# Patient Record
Sex: FEMALE | Race: WHITE | Employment: PART TIME | URBAN - METROPOLITAN AREA
[De-identification: names, ages, dates, MRNs, and addresses within clinical notes are randomized per-mention and may not be internally consistent; named-entity substitution may affect disease eponyms.]

---

## 2019-03-06 ENCOUNTER — HOSPITAL ENCOUNTER (OUTPATIENT)
Age: 24
Setting detail: SPECIMEN
Discharge: HOME | End: 2019-03-06
Payer: MEDICAID

## 2019-03-06 LAB
ABSOLUTE EOS #: 0.07 K/UL (ref 0–0.44)
ABSOLUTE IMMATURE GRANULOCYTE: <0.03 K/UL (ref 0–0.3)
ABSOLUTE LYMPH #: 3.07 K/UL (ref 1.1–3.7)
ABSOLUTE MONO #: 0.58 K/UL (ref 0.1–1.2)
ALBUMIN SERPL-MCNC: 4.5 G/DL (ref 3.5–5.2)
ALBUMIN/GLOBULIN RATIO: 1.6 (ref 1–2.5)
ALP BLD-CCNC: 97 U/L (ref 35–104)
ALT SERPL-CCNC: 6 U/L (ref 5–33)
ANION GAP SERPL CALCULATED.3IONS-SCNC: 13 MMOL/L (ref 9–17)
AST SERPL-CCNC: 16 U/L
BASOPHILS # BLD: 0 % (ref 0–2)
BASOPHILS ABSOLUTE: <0.03 K/UL (ref 0–0.2)
BILIRUB SERPL-MCNC: 0.27 MG/DL (ref 0.3–1.2)
BUN BLDV-MCNC: 7 MG/DL (ref 6–20)
BUN/CREAT BLD: ABNORMAL (ref 9–20)
CALCIUM SERPL-MCNC: 9.2 MG/DL (ref 8.6–10.4)
CHLORIDE BLD-SCNC: 101 MMOL/L (ref 98–107)
CHOLESTEROL/HDL RATIO: 2.8
CHOLESTEROL: 132 MG/DL
CO2: 25 MMOL/L (ref 20–31)
CREAT SERPL-MCNC: 0.47 MG/DL (ref 0.5–0.9)
DIFFERENTIAL TYPE: NORMAL
EOSINOPHILS RELATIVE PERCENT: 1 % (ref 1–4)
GFR AFRICAN AMERICAN: >60 ML/MIN
GFR NON-AFRICAN AMERICAN: >60 ML/MIN
GFR SERPL CREATININE-BSD FRML MDRD: ABNORMAL ML/MIN/{1.73_M2}
GFR SERPL CREATININE-BSD FRML MDRD: ABNORMAL ML/MIN/{1.73_M2}
GLUCOSE BLD-MCNC: 74 MG/DL (ref 70–99)
HCT VFR BLD CALC: 43.4 % (ref 36.3–47.1)
HDLC SERPL-MCNC: 48 MG/DL
HEMOGLOBIN: 13.8 G/DL (ref 11.9–15.1)
IMMATURE GRANULOCYTES: 0 %
LDL CHOLESTEROL: 73 MG/DL (ref 0–130)
LYMPHOCYTES # BLD: 34 % (ref 24–43)
MCH RBC QN AUTO: 29.8 PG (ref 25.2–33.5)
MCHC RBC AUTO-ENTMCNC: 31.8 G/DL (ref 28.4–34.8)
MCV RBC AUTO: 93.7 FL (ref 82.6–102.9)
MONOCYTES # BLD: 6 % (ref 3–12)
NRBC AUTOMATED: 0 PER 100 WBC
PDW BLD-RTO: 13.4 % (ref 11.8–14.4)
PLATELET # BLD: 350 K/UL (ref 138–453)
PLATELET ESTIMATE: NORMAL
PMV BLD AUTO: 11 FL (ref 8.1–13.5)
POTASSIUM SERPL-SCNC: 3.8 MMOL/L (ref 3.7–5.3)
RBC # BLD: 4.63 M/UL (ref 3.95–5.11)
RBC # BLD: NORMAL 10*6/UL
SEG NEUTROPHILS: 59 % (ref 36–65)
SEGMENTED NEUTROPHILS ABSOLUTE COUNT: 5.34 K/UL (ref 1.5–8.1)
SODIUM BLD-SCNC: 139 MMOL/L (ref 135–144)
TOTAL PROTEIN: 7.4 G/DL (ref 6.4–8.3)
TRIGL SERPL-MCNC: 55 MG/DL
TSH SERPL DL<=0.05 MIU/L-ACNC: 1.88 MIU/L (ref 0.3–5)
VLDLC SERPL CALC-MCNC: NORMAL MG/DL (ref 1–30)
WBC # BLD: 9.1 K/UL (ref 3.5–11.3)
WBC # BLD: NORMAL 10*3/UL

## 2019-03-15 LAB — CYTOLOGY REPORT: NORMAL

## 2019-11-05 ENCOUNTER — HOSPITAL ENCOUNTER (OUTPATIENT)
Age: 24
Setting detail: SPECIMEN
Discharge: HOME | End: 2019-11-05
Payer: MEDICAID

## 2019-11-06 LAB
CULTURE: ABNORMAL
Lab: ABNORMAL
SPECIMEN DESCRIPTION: ABNORMAL

## 2020-04-06 ENCOUNTER — HOSPITAL ENCOUNTER (OUTPATIENT)
Age: 25
Discharge: HOME | End: 2020-04-06
Payer: MEDICAID

## 2020-04-06 LAB
ABO CHECK: NORMAL
ERYTHROCYTE [DISTWIDTH] IN BLOOD BY AUTOMATED COUNT: 13.9 % (ref 11.5–14.5)
ERYTHROCYTE [DISTWIDTH] IN BLOOD BY AUTOMATED COUNT: 46.9 FL (ref 35–45)
GEL INDIRECT COOMBS: NORMAL
HCT VFR BLD CALC: 39.7 % (ref 37–47)
HEMOGLOBIN: 12.9 GM/DL (ref 12–16)
HEPATITIS B SURFACE ANTIGEN: NEGATIVE
MCH RBC QN AUTO: 30.4 PG (ref 26–33)
MCHC RBC AUTO-ENTMCNC: 32.5 GM/DL (ref 32.2–35.5)
MCV RBC AUTO: 93.4 FL (ref 81–99)
PLATELET # BLD: 279 THOU/MM3 (ref 130–400)
PMV BLD AUTO: 10.4 FL (ref 9.4–12.4)
RBC # BLD: 4.25 MILL/MM3 (ref 4.2–5.4)
RH FACTOR: NORMAL
RPR: NONREACTIVE
RUBELLA: 54.6 IU/ML
WBC # BLD: 8.8 THOU/MM3 (ref 4.8–10.8)

## 2020-04-06 PROCEDURE — 87389 HIV-1 AG W/HIV-1&-2 AB AG IA: CPT

## 2020-04-06 PROCEDURE — 86592 SYPHILIS TEST NON-TREP QUAL: CPT

## 2020-04-06 PROCEDURE — 36415 COLL VENOUS BLD VENIPUNCTURE: CPT

## 2020-04-06 PROCEDURE — 87340 HEPATITIS B SURFACE AG IA: CPT

## 2020-04-06 PROCEDURE — 85027 COMPLETE CBC AUTOMATED: CPT

## 2020-04-06 PROCEDURE — 86901 BLOOD TYPING SEROLOGIC RH(D): CPT

## 2020-04-06 PROCEDURE — 87086 URINE CULTURE/COLONY COUNT: CPT

## 2020-04-06 PROCEDURE — 86885 COOMBS TEST INDIRECT QUAL: CPT

## 2020-04-06 PROCEDURE — 86762 RUBELLA ANTIBODY: CPT

## 2020-04-06 PROCEDURE — 86900 BLOOD TYPING SEROLOGIC ABO: CPT

## 2020-04-07 LAB
HIV-2 AB: NEGATIVE
ORGANISM: ABNORMAL
URINE CULTURE, ROUTINE: ABNORMAL

## 2020-04-10 PROCEDURE — 96375 TX/PRO/DX INJ NEW DRUG ADDON: CPT

## 2020-04-10 PROCEDURE — 99285 EMERGENCY DEPT VISIT HI MDM: CPT

## 2020-04-10 PROCEDURE — 96374 THER/PROPH/DIAG INJ IV PUSH: CPT

## 2020-04-10 PROCEDURE — 96372 THER/PROPH/DIAG INJ SC/IM: CPT

## 2020-04-10 RX ORDER — ONDANSETRON 2 MG/ML
4 INJECTION INTRAMUSCULAR; INTRAVENOUS ONCE
Status: COMPLETED | OUTPATIENT
Start: 2020-04-11 | End: 2020-04-11

## 2020-04-10 RX ORDER — 0.9 % SODIUM CHLORIDE 0.9 %
1000 INTRAVENOUS SOLUTION INTRAVENOUS ONCE
Status: COMPLETED | OUTPATIENT
Start: 2020-04-11 | End: 2020-04-11

## 2020-04-11 ENCOUNTER — HOSPITAL ENCOUNTER (EMERGENCY)
Age: 25
Discharge: HOME | End: 2020-04-11
Payer: MEDICAID

## 2020-04-11 VITALS
WEIGHT: 156 LBS | OXYGEN SATURATION: 100 % | BODY MASS INDEX: 31.45 KG/M2 | DIASTOLIC BLOOD PRESSURE: 56 MMHG | HEIGHT: 59 IN | RESPIRATION RATE: 14 BRPM | HEART RATE: 90 BPM | TEMPERATURE: 98.6 F | SYSTOLIC BLOOD PRESSURE: 105 MMHG

## 2020-04-11 LAB
ALBUMIN SERPL-MCNC: 4.4 G/DL (ref 3.5–5.1)
ALP BLD-CCNC: 65 U/L (ref 38–126)
ALT SERPL-CCNC: 8 U/L (ref 11–66)
ANION GAP SERPL CALCULATED.3IONS-SCNC: 15 MEQ/L (ref 8–16)
AST SERPL-CCNC: 19 U/L (ref 5–40)
BACTERIA: ABNORMAL /HPF
BASOPHILS # BLD: 0.3 %
BASOPHILS ABSOLUTE: 0 THOU/MM3 (ref 0–0.1)
BILIRUB SERPL-MCNC: 0.3 MG/DL (ref 0.3–1.2)
BILIRUBIN URINE: NEGATIVE
BLOOD, URINE: NEGATIVE
BUN BLDV-MCNC: 7 MG/DL (ref 7–22)
CALCIUM SERPL-MCNC: 9.7 MG/DL (ref 8.5–10.5)
CASTS 2: ABNORMAL /LPF
CASTS UA: ABNORMAL /LPF
CHARACTER, URINE: CLEAR
CHLORIDE BLD-SCNC: 97 MEQ/L (ref 98–111)
CO2: 24 MEQ/L (ref 23–33)
COLOR: YELLOW
CREAT SERPL-MCNC: 0.4 MG/DL (ref 0.4–1.2)
CRYSTALS, UA: ABNORMAL
EOSINOPHIL # BLD: 0.2 %
EOSINOPHILS ABSOLUTE: 0 THOU/MM3 (ref 0–0.4)
EPITHELIAL CELLS, UA: ABNORMAL /HPF
ERYTHROCYTE [DISTWIDTH] IN BLOOD BY AUTOMATED COUNT: 14 % (ref 11.5–14.5)
ERYTHROCYTE [DISTWIDTH] IN BLOOD BY AUTOMATED COUNT: 46 FL (ref 35–45)
GFR SERPL CREATININE-BSD FRML MDRD: > 90 ML/MIN/1.73M2
GLUCOSE BLD-MCNC: 87 MG/DL (ref 70–108)
GLUCOSE URINE: NEGATIVE MG/DL
HCT VFR BLD CALC: 41.6 % (ref 37–47)
HEMOGLOBIN: 13.6 GM/DL (ref 12–16)
IMMATURE GRANS (ABS): 0.05 THOU/MM3 (ref 0–0.07)
IMMATURE GRANULOCYTES: 0.4 %
KETONES, URINE: 40
LEUKOCYTE ESTERASE, URINE: ABNORMAL
LIPASE: 28.4 U/L (ref 5.6–51.3)
LYMPHOCYTES # BLD: 20.8 %
LYMPHOCYTES ABSOLUTE: 2.5 THOU/MM3 (ref 1–4.8)
MAGNESIUM: 2 MG/DL (ref 1.6–2.4)
MCH RBC QN AUTO: 29.8 PG (ref 26–33)
MCHC RBC AUTO-ENTMCNC: 32.7 GM/DL (ref 32.2–35.5)
MCV RBC AUTO: 91 FL (ref 81–99)
MISCELLANEOUS 2: ABNORMAL
MONOCYTES # BLD: 5.6 %
MONOCYTES ABSOLUTE: 0.7 THOU/MM3 (ref 0.4–1.3)
NITRITE, URINE: NEGATIVE
NUCLEATED RED BLOOD CELLS: 0 /100 WBC
OSMOLALITY CALCULATION: 269.3 MOSMOL/KG (ref 275–300)
PH UA: 5.5 (ref 5–9)
PLATELET # BLD: 291 THOU/MM3 (ref 130–400)
PMV BLD AUTO: 10.3 FL (ref 9.4–12.4)
POTASSIUM REFLEX MAGNESIUM: 3.5 MEQ/L (ref 3.5–5.2)
PROTEIN UA: NEGATIVE
RBC # BLD: 4.57 MILL/MM3 (ref 4.2–5.4)
RBC URINE: ABNORMAL /HPF
RENAL EPITHELIAL, UA: ABNORMAL
SEG NEUTROPHILS: 72.7 %
SEGMENTED NEUTROPHILS ABSOLUTE COUNT: 8.6 THOU/MM3 (ref 1.8–7.7)
SODIUM BLD-SCNC: 136 MEQ/L (ref 135–145)
SPECIFIC GRAVITY, URINE: 1.02 (ref 1–1.03)
TOTAL PROTEIN: 7.9 G/DL (ref 6.1–8)
UROBILINOGEN, URINE: 1 EU/DL (ref 0–1)
WBC # BLD: 11.8 THOU/MM3 (ref 4.8–10.8)
WBC UA: ABNORMAL /HPF
YEAST: ABNORMAL

## 2020-04-11 PROCEDURE — 83690 ASSAY OF LIPASE: CPT

## 2020-04-11 PROCEDURE — 85025 COMPLETE CBC W/AUTO DIFF WBC: CPT

## 2020-04-11 PROCEDURE — 36415 COLL VENOUS BLD VENIPUNCTURE: CPT

## 2020-04-11 PROCEDURE — 80053 COMPREHEN METABOLIC PANEL: CPT

## 2020-04-11 PROCEDURE — 83735 ASSAY OF MAGNESIUM: CPT

## 2020-04-11 PROCEDURE — 6370000000 HC RX 637 (ALT 250 FOR IP): Performed by: EMERGENCY MEDICINE

## 2020-04-11 PROCEDURE — 6360000002 HC RX W HCPCS: Performed by: EMERGENCY MEDICINE

## 2020-04-11 PROCEDURE — 2580000003 HC RX 258: Performed by: EMERGENCY MEDICINE

## 2020-04-11 PROCEDURE — 81001 URINALYSIS AUTO W/SCOPE: CPT

## 2020-04-11 RX ORDER — FAMOTIDINE 20 MG/1
40 TABLET, FILM COATED ORAL ONCE
Status: COMPLETED | OUTPATIENT
Start: 2020-04-11 | End: 2020-04-11

## 2020-04-11 RX ORDER — LANOLIN ALCOHOL/MO/W.PET/CERES
50 CREAM (GRAM) TOPICAL DAILY
Qty: 30 TABLET | Refills: 3 | Status: ON HOLD | OUTPATIENT
Start: 2020-04-11 | End: 2020-08-14

## 2020-04-11 RX ORDER — METOCLOPRAMIDE HYDROCHLORIDE 5 MG/ML
5 INJECTION INTRAMUSCULAR; INTRAVENOUS ONCE
Status: COMPLETED | OUTPATIENT
Start: 2020-04-11 | End: 2020-04-11

## 2020-04-11 RX ORDER — PROMETHAZINE HYDROCHLORIDE 25 MG/ML
25 INJECTION, SOLUTION INTRAMUSCULAR; INTRAVENOUS ONCE
Status: COMPLETED | OUTPATIENT
Start: 2020-04-11 | End: 2020-04-11

## 2020-04-11 RX ORDER — ONDANSETRON 4 MG/1
4 TABLET, ORALLY DISINTEGRATING ORAL EVERY 8 HOURS PRN
Qty: 15 TABLET | Refills: 0 | Status: ON HOLD | OUTPATIENT
Start: 2020-04-11 | End: 2020-08-14

## 2020-04-11 RX ORDER — FAMOTIDINE 20 MG/1
20 TABLET, FILM COATED ORAL 2 TIMES DAILY
Qty: 60 TABLET | Refills: 0 | Status: ON HOLD | OUTPATIENT
Start: 2020-04-11 | End: 2020-08-14

## 2020-04-11 RX ADMIN — FAMOTIDINE 40 MG: 20 TABLET ORAL at 00:48

## 2020-04-11 RX ADMIN — PROMETHAZINE HYDROCHLORIDE 25 MG: 25 INJECTION INTRAMUSCULAR; INTRAVENOUS at 01:59

## 2020-04-11 RX ADMIN — METOCLOPRAMIDE 5 MG: 5 INJECTION, SOLUTION INTRAMUSCULAR; INTRAVENOUS at 01:30

## 2020-04-11 RX ADMIN — SODIUM CHLORIDE 1000 ML: 9 INJECTION, SOLUTION INTRAVENOUS at 00:13

## 2020-04-11 RX ADMIN — ONDANSETRON 4 MG: 2 INJECTION INTRAMUSCULAR; INTRAVENOUS at 00:17

## 2020-04-11 ASSESSMENT — PAIN DESCRIPTION - LOCATION: LOCATION: ABDOMEN;BACK

## 2020-04-11 ASSESSMENT — ENCOUNTER SYMPTOMS
CONSTIPATION: 0
NAUSEA: 1
BACK PAIN: 0
VOMITING: 1
COLOR CHANGE: 0
SHORTNESS OF BREATH: 0
COUGH: 0
DIARRHEA: 0
ABDOMINAL PAIN: 1

## 2020-04-11 ASSESSMENT — PAIN SCALES - GENERAL
PAINLEVEL_OUTOF10: 1
PAINLEVEL_OUTOF10: 1
PAINLEVEL_OUTOF10: 3

## 2020-04-11 ASSESSMENT — PAIN DESCRIPTION - PAIN TYPE: TYPE: ACUTE PAIN

## 2020-04-11 NOTE — ED NOTES
Pt medicated and educated. Pt continues to drool into emesis bag. Pt appears anxious at this time after having gagging spell. Pt states that \"it is all acid coming up\". VS updated. Will continue to monitor pt.       Crystal Palomo RN  04/11/20 9840

## 2020-04-11 NOTE — ED PROVIDER NOTES
[facility] COMPLAINT       Chief Complaint   Patient presents with    Emesis During Pregnancy       Nurses Notes reviewed and I agree except as noted in the HPI. HISTORY OF PRESENT ILLNESS    Luisito Wallace is a 22 y.o. female who presents to the Emergency Department for the evaluation of vomiting. Patient states that the vomiting started approximately an hour and a half before arrival to the emergency department. The patient is currently 14 weeks pregnant. She denies having any problem with nausea or vomiting during the pregnancy. She did eat Carlus Magyar this afternoon that did not seem to sit well with her. She also reports that she has a history of gastric reflux disease. Patient denies any fever, cough, shortness of breath. She has significant nausea and vomiting. No diarrhea. No dysuria, frequency or urgency. Patient does have mid epigastric abdominal pain. This began after her emesis. Patient states that this is the same pain that she has had during previous bouts of gastric reflux. The HPI was provided by the patient. REVIEW OF SYSTEMS     Review of Systems   Constitutional: Negative for fatigue and fever. Respiratory: Negative for cough and shortness of breath. Cardiovascular: Negative for chest pain and leg swelling. Gastrointestinal: Positive for abdominal pain (mid epigastric), nausea and vomiting. Negative for constipation and diarrhea. Genitourinary: Negative for dysuria, flank pain and urgency. Musculoskeletal: Negative for back pain. Skin: Negative for color change. Psychiatric/Behavioral: Negative for behavioral problems. PAST MEDICAL HISTORY    has a past medical history of Acid reflux, Asthma, and Cyclic vomiting syndrome. SURGICAL HISTORY      has no past surgical history on file.     CURRENT MEDICATIONS       Previous Medications    No medications on file       ALLERGIES     is allergic to codeine and suprax [cefixime]. FAMILY HISTORY     has no family status information on file. family history is not on file. SOCIAL HISTORY      reports previous alcohol use. PHYSICAL EXAM     INITIAL VITALS:  height is 4' 11\" (1.499 m) and weight is 156 lb (70.8 kg). Her axillary temperature is 98.6 °F (37 °C). Her blood pressure is 110/59 (abnormal) and her pulse is 98. Her respiration is 22 and oxygen saturation is 100%. Physical Exam  Constitutional:       Appearance: She is ill-appearing. She is not toxic-appearing. HENT:      Head: Normocephalic. Nose: Nose normal.      Mouth/Throat:      Mouth: Mucous membranes are moist.   Eyes:      Pupils: Pupils are equal, round, and reactive to light. Cardiovascular:      Rate and Rhythm: Normal rate and regular rhythm. Pulses: Normal pulses. Heart sounds: Normal heart sounds. No murmur. Pulmonary:      Effort: Pulmonary effort is normal. No respiratory distress. Breath sounds: Normal breath sounds. Abdominal:      General: Abdomen is flat. Bowel sounds are normal. There is no distension. Palpations: Abdomen is soft. Tenderness: There is no abdominal tenderness. There is no guarding. Comments: Actively vomiting during assessment   Skin:     General: Skin is warm and dry. Capillary Refill: Capillary refill takes less than 2 seconds. Neurological:      General: No focal deficit present. Mental Status: She is alert.    Psychiatric:         Mood and Affect: Mood normal.         Behavior: Behavior normal.          DIFFERENTIAL DIAGNOSIS:   Gastroenteritis, food poisoning, pancreatitis, gastroparesis    DIAGNOSTIC RESULTS     EKG: All EKG's are interpreted by the Emergency Department Physician who either signs or Co-signs this chart in the absence of a cardiologist.    none    RADIOLOGY: non-plainfilm images(s) such as CT, Ultrasound and MRI are read by the radiologist.    No orders to display discharged. She will follow-up with her obstetrician as scheduled. Patient has Phenergan suppositories at home and will use these as previous, she is also given Zofran, Pepcid and vitamin B6 prescriptions to take at home. She is advised to return to the emergency department for uncontrolled vomiting, temperature 100.5 or greater, abdominal pain, vaginal bleeding or discharge or any new concerns. CRITICAL CARE:   None    CONSULTS:  None    PROCEDURES:  None    FINAL IMPRESSION      1. Nausea and vomiting during pregnancy          DISPOSITION/PLAN   Discharge    PATIENT REFERRED TO:  Kiara Guzman MD  [address] [place] [address] [phone]      as scheduled      DISCHARGE MEDICATIONS:  New Prescriptions    FAMOTIDINE (PEPCID) 20 MG TABLET    Take 1 tablet by mouth 2 times daily    ONDANSETRON (ZOFRAN ODT) 4 MG DISINTEGRATING TABLET    Take 1 tablet by mouth every 8 hours as needed for Nausea or Vomiting    VITAMIN B-6 (PYRIDOXINE) 50 MG TABLET    Take 1 tablet by mouth daily       (Please note that portions of this note were completed with a voice recognition program.  Efforts were made to edit the dictations but occasionally words are mis-transcribed.)    The patient was given an opportunity to see the Emergency Attending. The patient voiced understanding that I was a Mid-LevelProvider and was in agreement with being seen independently by myself.           YOVANNY Loving - DAVID  04/11/20 8239

## 2020-04-11 NOTE — ED TRIAGE NOTES
Pt presents to the ED with c/o emesis during pregnancy for the last hour. Pt reports a hx of acid reflux and cyclic vomiting. Pt states she has not had problems with emesis during her 14 weeks of pregnancy until now. Pt was vomiting upon arrival. Pt in cot. Pt VS obtained. IV inserted and labs obtained. Pt alert and oriented. Pt breathing easy and unlabored. Yumiko Pedroza, CNP at bedside.

## 2020-04-11 NOTE — ED NOTES
Fetal heart tones assessed. 136bpm and Jed Mohamud CNP notified. Pt medicated and educated. Pt gagging while attempting to keep her Pepcid down.  Updated  on pt care per okay with pt.      Fox Hancock RN  04/11/20 1755

## 2020-04-11 NOTE — ED NOTES
Pt ambulated to restroom without difficulties. Pt provided urine specimen. Specimen sent to lab.       Jericho Heart RN  04/11/20 6881

## 2020-04-13 ENCOUNTER — CARE COORDINATION (OUTPATIENT)
Dept: CARE COORDINATION | Age: 25
End: 2020-04-13

## 2020-04-13 NOTE — CARE COORDINATION
Patient contacted regarding recent discharge and COVID-19 risk   Care Transition Nurse/ Ambulatory Care Manager contacted the patient by telephone to perform post discharge assessment. Verified name and  with patient as identifiers. Patient has following risk factors of: pregnancy. CTN/ACM reviewed discharge instructions, medical action plan and red flags related to discharge diagnosis. Reviewed and educated them on any new and changed medications related to discharge diagnosis. Advised obtaining a 90-day supply of all daily and as-needed medications. Education provided regarding infection prevention, and signs and symptoms of COVID-19 and when to seek medical attention with patient who verbalized understanding. Discussed exposure protocols and quarantine from [address] you at higher risk for severe illness  and given an opportunity for questions and concerns. The patient agrees to contact the COVID-19 hotline [phone] or PCP office for questions related to their healthcare. CTN/ACM provided contact information for future reference. From CDC: Are you at higher risk for severe illness?  Wash your hands often.  Avoid close contact (6 feet, which is about two arm lengths) with people who are sick.  Put distance between yourself and other people if COVID-19 is spreading in your community.  Clean and disinfect frequently touched surfaces.  Avoid all cruise travel and non-essential air travel.  Call your healthcare professional if you have concerns about COVID-19 and your underlying condition or if you are sick.     For more information on steps you can take to protect yourself, see CDC's How to [address] [facility]

## 2020-04-27 ENCOUNTER — CARE COORDINATION (OUTPATIENT)
Dept: CARE COORDINATION | Age: 25
End: 2020-04-27

## 2020-07-04 ENCOUNTER — HOSPITAL ENCOUNTER (OUTPATIENT)
Age: 25
Discharge: HOME | End: 2020-07-04
Attending: OBSTETRICS & GYNECOLOGY | Admitting: OBSTETRICS & GYNECOLOGY
Payer: MEDICAID

## 2020-07-04 VITALS
HEIGHT: 59 IN | TEMPERATURE: 97.5 F | OXYGEN SATURATION: 98 % | SYSTOLIC BLOOD PRESSURE: 104 MMHG | HEART RATE: 86 BPM | RESPIRATION RATE: 16 BRPM | DIASTOLIC BLOOD PRESSURE: 55 MMHG | WEIGHT: 159.2 LBS | BODY MASS INDEX: 32.09 KG/M2

## 2020-07-04 PROBLEM — R10.9 ABDOMINAL PAIN: Status: ACTIVE | Noted: 2020-07-04

## 2020-07-04 LAB
BACTERIA: ABNORMAL /HPF
BILIRUBIN URINE: NEGATIVE
BLOOD, URINE: ABNORMAL
CASTS 2: ABNORMAL /LPF
CASTS UA: ABNORMAL /LPF
CHARACTER, URINE: ABNORMAL
COLOR: YELLOW
CRYSTALS, UA: ABNORMAL
EPITHELIAL CELLS, UA: ABNORMAL /HPF
GLUCOSE URINE: NEGATIVE MG/DL
KETONES, URINE: NEGATIVE
LEUKOCYTE ESTERASE, URINE: ABNORMAL
MISCELLANEOUS 2: ABNORMAL
NITRITE, URINE: POSITIVE
PH UA: 6.5 (ref 5–9)
PROTEIN UA: 300
RBC URINE: ABNORMAL /HPF
RENAL EPITHELIAL, UA: ABNORMAL
SPECIFIC GRAVITY, URINE: 1.02 (ref 1–1.03)
UROBILINOGEN, URINE: 1 EU/DL (ref 0–1)
WBC UA: > 100 /HPF
YEAST: ABNORMAL

## 2020-07-04 PROCEDURE — 87186 SC STD MICRODIL/AGAR DIL: CPT

## 2020-07-04 PROCEDURE — 51701 INSERT BLADDER CATHETER: CPT

## 2020-07-04 PROCEDURE — 2709999900 HC NON-CHARGEABLE SUPPLY

## 2020-07-04 PROCEDURE — 6360000002 HC RX W HCPCS: Performed by: OBSTETRICS & GYNECOLOGY

## 2020-07-04 PROCEDURE — 87077 CULTURE AEROBIC IDENTIFY: CPT

## 2020-07-04 PROCEDURE — 96360 HYDRATION IV INFUSION INIT: CPT

## 2020-07-04 PROCEDURE — 2580000003 HC RX 258: Performed by: OBSTETRICS & GYNECOLOGY

## 2020-07-04 PROCEDURE — 87086 URINE CULTURE/COLONY COUNT: CPT

## 2020-07-04 PROCEDURE — 96365 THER/PROPH/DIAG IV INF INIT: CPT

## 2020-07-04 PROCEDURE — 96361 HYDRATE IV INFUSION ADD-ON: CPT

## 2020-07-04 PROCEDURE — 81001 URINALYSIS AUTO W/SCOPE: CPT

## 2020-07-04 RX ORDER — SODIUM CHLORIDE, SODIUM LACTATE, POTASSIUM CHLORIDE, AND CALCIUM CHLORIDE .6; .31; .03; .02 G/100ML; G/100ML; G/100ML; G/100ML
2000 INJECTION, SOLUTION INTRAVENOUS CONTINUOUS
Status: ACTIVE | OUTPATIENT
Start: 2020-07-04 | End: 2020-07-04

## 2020-07-04 RX ORDER — SODIUM CHLORIDE, SODIUM LACTATE, POTASSIUM CHLORIDE, CALCIUM CHLORIDE 600; 310; 30; 20 MG/100ML; MG/100ML; MG/100ML; MG/100ML
INJECTION, SOLUTION INTRAVENOUS CONTINUOUS
Status: DISCONTINUED | OUTPATIENT
Start: 2020-07-04 | End: 2020-07-04 | Stop reason: HOSPADM

## 2020-07-04 RX ADMIN — SODIUM CHLORIDE, POTASSIUM CHLORIDE, SODIUM LACTATE AND CALCIUM CHLORIDE 1000 ML: 600; 310; 30; 20 INJECTION, SOLUTION INTRAVENOUS at 17:35

## 2020-07-04 RX ADMIN — SODIUM CHLORIDE, POTASSIUM CHLORIDE, SODIUM LACTATE AND CALCIUM CHLORIDE: 600; 310; 30; 20 INJECTION, SOLUTION INTRAVENOUS at 18:37

## 2020-07-04 RX ADMIN — CEFTRIAXONE SODIUM 1 G: 1 INJECTION, POWDER, FOR SOLUTION INTRAMUSCULAR; INTRAVENOUS at 20:37

## 2020-07-04 ASSESSMENT — PAIN DESCRIPTION - DESCRIPTORS
DESCRIPTORS: CRAMPING

## 2020-07-04 NOTE — FLOWSHEET NOTE
Dr. Darrell Bauman called unit. Updated on pt's status that cervix is closed/thick and high, Fhr tracing reactive with a variable decel just occurring. IV bolus infusing. No new orders.

## 2020-07-04 NOTE — PLAN OF CARE
Problem: Pain:  Goal: Pain level will decrease  Description: Pain level will decrease  Note: States pain is less since lying down. Problem: Pain:  Goal: Control of acute pain  Description: Control of acute pain  Note: States pain is less since lying down. Problem: Pain:  Goal: Control of chronic pain  Description: Control of chronic pain  Note: States pain is less since lying down. Problem: Healthcare acquired conditions:  Goal: Absence of healthcare acquired conditions  Description: Absence of healthcare acquired conditions  Note: No health acquired conditions. Problem: Discharge Planning:  Goal: Discharged to appropriate level of care  Description: Discharged to appropriate level of care  Note: Will be hydrated with IV fluids time 2 liters and po fluids, then discharged home. Care plan reviewed with patient and boyfriend  Patient and boyfriendverbalize understanding of the plan of care and contribute to goal setting.

## 2020-07-04 NOTE — FLOWSHEET NOTE
Gr 1 P 0 EDC 10/07/2020 here with c/o pain on right side of abdomen since waking up this morning. States pain comes and goes. Denies ROM or bleeding. States baby active. hospitals has only had 1 glass of water to drink today. Patient to bathroom to void, informed of maternal drug testing policy in place on all laboring patients. Verbal consent received, paper consent to be signed and urine to be sent. Explained patients right to have family, representative or physician notified of their admission. Patient has Declined for physician to be notified. Patient has Declined for family/representative to be notified. Dr. Micah Senior here in labor and delivery as pt. Came in. hospitals had spoke to pt. Earlier and informed her to hydrate well.

## 2020-07-04 NOTE — FLOWSHEET NOTE
Plan of care reviewed with pt. And her boyfriend. Verbalizes understanding. Pt. States need to void. Str. Cath for urine reviewed. Pt. Str. Cath for small amt. Alves, yellow urine.

## 2020-07-04 NOTE — FLOWSHEET NOTE
To stirrups. FFN obtained per speculum exam. Pt. Taken out of stirrups. Vag. Exam. cervix closed/thick and high. No bleeding or fluid leaking.

## 2020-07-05 NOTE — FLOWSHEET NOTE
Dr. Luis Foreman returned call to unit. Updated on patient status and UA results. New orders received.

## 2020-07-05 NOTE — FLOWSHEET NOTE
Discharge instructions given and reviewed with patient. Informed patient to notify physican or come back to [facility] if leaking fluid from vagina with or without contractions. Bright red vaginal bleeding occurs that is as heavy as or heavier than a period. Regular contractions that are 2-5 minutes apart that are longer, stronger, and closer together. Decreased fetal movement. Elevated temperature >100.5°F and chills. Blurred vision, spots before eyes, unrelievable headache, severe facial swelling, or upper abdominal pain. Questions and concerns denied by pt and FOB Saroj Worley, papers signed. Reminded pt to  her ATB tomorrow from [facility] in [address] [place] [address]. Encouraged pt to keep next scheduled appt with Dr. Radha Ny or f/u with OBGYN office sooner if needed.

## 2020-07-05 NOTE — PLAN OF CARE
Problem: Pain:  Goal: Control of acute pain  Description: Control of acute pain  7/4/2020 2001 by Rosa Elena Rosenberg RN  Outcome: Ongoing  Note: Pt's ABD pain has resolved. Will continue to assess. Problem: Healthcare acquired conditions:  Goal: Absence of healthcare acquired conditions  Description: Absence of healthcare acquired conditions  7/4/2020 2001 by Rosa Elena Rosenberg RN  Outcome: Ongoing  Note: No healthcare acquired conditions. Problem: Discharge Planning:  Goal: Discharged to appropriate level of care  Description: Discharged to appropriate level of care  7/4/2020 2001 by Rosa Elena Rosenberg RN  Outcome: Ongoing  Note: Pt planning to be discharged home to private residence. Care plan reviewed with patient and Og Nunez. Patient and Og Nunez verbalize understanding of the plan of care and contribute to goal setting.

## 2020-07-06 LAB
ORGANISM: ABNORMAL
URINE CULTURE REFLEX: ABNORMAL

## 2020-07-24 ENCOUNTER — HOSPITAL ENCOUNTER (OUTPATIENT)
Age: 25
Discharge: HOME | End: 2020-07-24
Attending: OBSTETRICS & GYNECOLOGY | Admitting: OBSTETRICS & GYNECOLOGY
Payer: COMMERCIAL

## 2020-07-24 VITALS
SYSTOLIC BLOOD PRESSURE: 128 MMHG | RESPIRATION RATE: 18 BRPM | TEMPERATURE: 99.3 F | DIASTOLIC BLOOD PRESSURE: 79 MMHG | HEART RATE: 82 BPM | OXYGEN SATURATION: 98 %

## 2020-07-24 PROBLEM — O36.8190 DECREASED FETAL MOVEMENT AFFECTING MANAGEMENT OF MOTHER, ANTEPARTUM: Status: ACTIVE | Noted: 2020-07-24

## 2020-07-24 PROCEDURE — 59025 FETAL NON-STRESS TEST: CPT

## 2020-07-25 NOTE — FLOWSHEET NOTE
EFM reactive. Good fetal movement noted. Pt feels comfortable going home. All questions and concerns answered.

## 2020-07-25 NOTE — FLOWSHEET NOTE
Pt arrives from ED to [address] [place] [address]  with c/o of decrease fetal movement since 1900. Pt states she has no ate or drank anything since 1730. Pt denies any leaking of fluid or vaginal bleeding. 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 Pt denies any other ob complaints or pain. EFM applied.

## 2020-08-14 ENCOUNTER — HOSPITAL ENCOUNTER (OUTPATIENT)
Age: 25
Discharge: HOME | End: 2020-08-14
Attending: OBSTETRICS & GYNECOLOGY | Admitting: OBSTETRICS & GYNECOLOGY
Payer: COMMERCIAL

## 2020-08-14 VITALS
RESPIRATION RATE: 18 BRPM | SYSTOLIC BLOOD PRESSURE: 108 MMHG | TEMPERATURE: 96.9 F | HEART RATE: 82 BPM | DIASTOLIC BLOOD PRESSURE: 69 MMHG

## 2020-08-14 PROBLEM — O47.00 THREATENED PRETERM LABOR, ANTEPARTUM: Status: ACTIVE | Noted: 2020-08-14

## 2020-08-14 LAB
AMORPHOUS: ABNORMAL
BACTERIA: ABNORMAL /HPF
BILIRUBIN URINE: NEGATIVE
BLOOD, URINE: NEGATIVE
CASTS 2: ABNORMAL /LPF
CASTS UA: ABNORMAL /LPF
CHARACTER, URINE: CLEAR
COLOR: YELLOW
CRYSTALS, UA: ABNORMAL
EPITHELIAL CELLS, UA: ABNORMAL /HPF
GLUCOSE URINE: NEGATIVE MG/DL
KETONES, URINE: NEGATIVE
LEUKOCYTE ESTERASE, URINE: NEGATIVE
MISCELLANEOUS 2: ABNORMAL
MUCUS: ABNORMAL
NITRITE, URINE: NEGATIVE
PH UA: 7.5 (ref 5–9)
PROTEIN UA: ABNORMAL
RBC URINE: ABNORMAL /HPF
RENAL EPITHELIAL, UA: ABNORMAL
SPECIFIC GRAVITY, URINE: 1.02 (ref 1–1.03)
UROBILINOGEN, URINE: 1 EU/DL (ref 0–1)
WBC UA: ABNORMAL /HPF
YEAST: ABNORMAL

## 2020-08-14 PROCEDURE — 51701 INSERT BLADDER CATHETER: CPT

## 2020-08-14 PROCEDURE — 96361 HYDRATE IV INFUSION ADD-ON: CPT

## 2020-08-14 PROCEDURE — 81001 URINALYSIS AUTO W/SCOPE: CPT

## 2020-08-14 PROCEDURE — 96360 HYDRATION IV INFUSION INIT: CPT

## 2020-08-14 PROCEDURE — 2580000003 HC RX 258: Performed by: OBSTETRICS & GYNECOLOGY

## 2020-08-14 PROCEDURE — 6360000002 HC RX W HCPCS: Performed by: OBSTETRICS & GYNECOLOGY

## 2020-08-14 PROCEDURE — 96372 THER/PROPH/DIAG INJ SC/IM: CPT

## 2020-08-14 RX ORDER — BETAMETHASONE SODIUM PHOSPHATE AND BETAMETHASONE ACETATE 3; 3 MG/ML; MG/ML
12 INJECTION, SUSPENSION INTRA-ARTICULAR; INTRALESIONAL; INTRAMUSCULAR; SOFT TISSUE DAILY
Status: DISCONTINUED | OUTPATIENT
Start: 2020-08-14 | End: 2020-08-14 | Stop reason: HOSPADM

## 2020-08-14 RX ORDER — SODIUM CHLORIDE, SODIUM LACTATE, POTASSIUM CHLORIDE, CALCIUM CHLORIDE 600; 310; 30; 20 MG/100ML; MG/100ML; MG/100ML; MG/100ML
INJECTION, SOLUTION INTRAVENOUS CONTINUOUS
Status: DISCONTINUED | OUTPATIENT
Start: 2020-08-14 | End: 2020-08-14 | Stop reason: HOSPADM

## 2020-08-14 RX ADMIN — BETAMETHASONE SODIUM PHOSPHATE AND BETAMETHASONE ACETATE 12 MG: 3; 3 INJECTION, SUSPENSION INTRA-ARTICULAR; INTRALESIONAL; INTRAMUSCULAR at 13:10

## 2020-08-14 RX ADMIN — SODIUM CHLORIDE, POTASSIUM CHLORIDE, SODIUM LACTATE AND CALCIUM CHLORIDE: 600; 310; 30; 20 INJECTION, SOLUTION INTRAVENOUS at 13:00

## 2020-08-14 RX ADMIN — SODIUM CHLORIDE, POTASSIUM CHLORIDE, SODIUM LACTATE AND CALCIUM CHLORIDE: 600; 310; 30; 20 INJECTION, SOLUTION INTRAVENOUS at 12:54

## 2020-08-14 NOTE — FLOWSHEET NOTE
Dr Eyal Hansen notified of urine results, patient finished liter bolus, couple of contractions since she has been here, pt states her back ache is better. Plan is for ve, and discharge home if the same as in the office, return tomorrow for second steroid shot.

## 2020-08-14 NOTE — FLOWSHEET NOTE
Pt arrives in wheelchair from dr David Hampton office, doctor called prior to her arrival with orders, pt is a 26year old , edc 10/7/20, 32 2/7 weeks, was in office today for routine visit and mentioned that she was having a back ache, denied rom and vaginal bleeding, has been feeling baby move, doctor radha did vaginal exam in office and cervix was 1 cm, 70% effaced and soft, pt with bicornuate uterus, orders for iv fluids, and straight cath and steroids. Explained patients right to have family, representative or physician notified of their admission. Patient has Declined for physician to be notified. Patient has declined for family/representative to be notified.

## 2020-08-15 ENCOUNTER — APPOINTMENT (OUTPATIENT)
Dept: LABOR AND DELIVERY | Age: 25
End: 2020-08-15
Payer: COMMERCIAL

## 2020-08-15 ENCOUNTER — HOSPITAL ENCOUNTER (OUTPATIENT)
Age: 25
Discharge: HOME | End: 2020-08-15
Attending: OBSTETRICS & GYNECOLOGY | Admitting: OBSTETRICS & GYNECOLOGY
Payer: COMMERCIAL

## 2020-08-15 VITALS
OXYGEN SATURATION: 97 % | SYSTOLIC BLOOD PRESSURE: 116 MMHG | RESPIRATION RATE: 16 BRPM | HEART RATE: 105 BPM | TEMPERATURE: 97.7 F | DIASTOLIC BLOOD PRESSURE: 68 MMHG

## 2020-08-15 PROBLEM — O26.90 PREGNANCY WITH COMPLICATION: Status: ACTIVE | Noted: 2020-08-15

## 2020-08-15 PROCEDURE — 96372 THER/PROPH/DIAG INJ SC/IM: CPT

## 2020-08-15 PROCEDURE — 6360000002 HC RX W HCPCS

## 2020-08-15 RX ORDER — BETAMETHASONE SODIUM PHOSPHATE AND BETAMETHASONE ACETATE 3; 3 MG/ML; MG/ML
INJECTION, SUSPENSION INTRA-ARTICULAR; INTRALESIONAL; INTRAMUSCULAR; SOFT TISSUE
Status: COMPLETED
Start: 2020-08-15 | End: 2020-08-15

## 2020-08-15 RX ORDER — BETAMETHASONE SODIUM PHOSPHATE AND BETAMETHASONE ACETATE 3; 3 MG/ML; MG/ML
12 INJECTION, SUSPENSION INTRA-ARTICULAR; INTRALESIONAL; INTRAMUSCULAR; SOFT TISSUE ONCE
Status: COMPLETED | OUTPATIENT
Start: 2020-08-15 | End: 2020-08-15

## 2020-08-15 RX ADMIN — BETAMETHASONE SODIUM PHOSPHATE AND BETAMETHASONE ACETATE 12 MG: 3; 3 INJECTION, SUSPENSION INTRA-ARTICULAR; INTRALESIONAL; INTRAMUSCULAR; SOFT TISSUE at 13:10

## 2020-08-15 RX ADMIN — BETAMETHASONE SODIUM PHOSPHATE AND BETAMETHASONE ACETATE 12 MG: 3; 3 INJECTION, SUSPENSION INTRA-ARTICULAR; INTRALESIONAL; INTRAMUSCULAR at 13:10

## 2020-08-15 NOTE — FLOWSHEET NOTE
Pt of Dr Quyen Bar here for her 2nd dose of celestone. Denies any leaking of fluid, vag bleeding, or contractions today. Reports +fm and states she's getting all her fetal kick counts as she should. States her next appt is Wednesday with Dr Quyen Bar. Dr Camila Munoz aware of pt coming for her injection and order placed.

## 2020-08-16 ENCOUNTER — HOSPITAL ENCOUNTER (OUTPATIENT)
Age: 25
Discharge: HOME | End: 2020-08-16
Attending: OBSTETRICS & GYNECOLOGY | Admitting: OBSTETRICS & GYNECOLOGY
Payer: COMMERCIAL

## 2020-08-16 VITALS
DIASTOLIC BLOOD PRESSURE: 72 MMHG | SYSTOLIC BLOOD PRESSURE: 111 MMHG | TEMPERATURE: 96.6 F | RESPIRATION RATE: 20 BRPM | WEIGHT: 174 LBS | OXYGEN SATURATION: 98 % | HEIGHT: 59 IN | BODY MASS INDEX: 35.08 KG/M2 | HEART RATE: 83 BPM

## 2020-08-16 PROBLEM — O99.891 PREGNANCY COMPLICATION BEFORE BIRTH: Status: ACTIVE | Noted: 2020-08-16

## 2020-08-16 LAB — AMNISURE PATIENT RESULT: NORMAL

## 2020-08-16 PROCEDURE — 84112 EVAL AMNIOTIC FLUID PROTEIN: CPT

## 2020-08-16 NOTE — PLAN OF CARE
Problem: Pain:  Goal: Pain level will decrease  Description: Pain level will decrease  Note: Pain level will decrease with warm bath, shower, tylenol  Goal: Control of acute pain  Description: Control of acute pain  Note: Pt is experiencing round ligament pain  Goal: Control of chronic pain  Description: Control of chronic pain  Note: Negtive for chronic pain     Problem: Healthcare acquired conditions:  Goal: Absence of healthcare acquired conditions  Description: Absence of healthcare acquired conditions  Note: Pt will be absent of any healthcare acquired conditions     Problem: Discharge Planning:  Goal: Discharged to appropriate level of care  Description: Discharged to appropriate level of care  Note: After evaluation and treatment of complaint, will be discharged to appropriate unit or discharged to home   Care plan reviewed with patient and . Patient and  verbalize understanding of the plan of care and contribute to goal setting.

## 2020-08-16 NOTE — FLOWSHEET NOTE
Dr Juliette Serrato notified of admission, complaints of leaking fluid from vagina, negative amnisure, no contractions. minimal variablity, slight acells with miniman variability. To continue EFM.

## 2020-08-16 NOTE — FLOWSHEET NOTE
Gr 1 P 0 admitted to labor and delivery stating she had a big gush of fluid from her vagina at about 0600 this AM. States baby is moving well. Also states she has a bicornate uterus. Denies any cramping.

## 2020-08-22 NOTE — PROGRESS NOTES
[address] [place] [address]                                NON STRESS TEST    PATIENT NAME: Fifi Ziegler                     :        1995  MED REC NO:   [id number]                           ROOM:       0003  ACCOUNT NO:   [de-identified]                           ADMIT DATE: 2020  PROVIDER:     Partha Guan M.D.      Myraesperanza Andradesoila:  2020    The patient is a 20-year-old G1, P0, who presented at 34 and 2 weeks  with complaints of decreased fetal movement. Non-stress test was  performed, that was reactive, 130 with moderate variability and  accelerations. No decelerations. She was feeling fetal movement upon  completion of the non-stress test and was discharged home. Precautions  reviewed and instructed to follow up as scheduled in the office.         Nora Guevara M.D.    D: 2020 11:38:47       T: 2020 14:41:52     [email or web address]  Job#: [id number]     Doc#: [id number]    CC:

## 2020-08-22 NOTE — PROGRESS NOTES
[address] [place] [address]                                NON STRESS TEST    PATIENT NAME: Matias Kapoor                     :        1995  MED REC NO:   [id number]                           ROOM:       0005  ACCOUNT NO:   [de-identified]                           ADMIT DATE: 2020  PROVIDER:     Sugey Westfall M.D.      Brandee Fitzgerald:  2020    The patient is a 23-year-old G1, P0, who presented at 28 and 4 with  complaints of leaking fluid. Fetal heart tones were reactive, 120 with  moderate variability and accelerations. No decelerations. She was  ruled out for rupture and no signs of  labor. Precautions were  reviewed. She was discharged home and instructed to follow up as  scheduled in the office.         Vidhi Pham M.D.    D: 2020 11:38:47       T: 2020 15:02:13     /DOUGIE  Job#: [id number]     Doc#: [id number]    CC:

## 2020-08-22 NOTE — PROGRESS NOTES
[address] [place] [address]                                NON STRESS TEST    PATIENT NAME: Glenn Ortiz                     :        1995  MED REC NO:   [id number]                           ROOM:       0004  ACCOUNT NO:   [de-identified]                           ADMIT DATE: 2020  PROVIDER:     Marimar Jones M.D.    DATE OF STUDY:  2020    INDICATIONS:  This is a 23-year-old  1, para 0, at 32 and 3,  coming in with right-sided abdominal pain. Fetal heart tones are in the  130s, moderate variability, positive accelerations. TOCO none. She had  a reactive NST.         Floresita Elizalde M.D.    D: 2020 10:19:42       T: 2020 12:27:40     SK/MARYSE_SANTANA  Job#: [id number]     Doc#: [id number]    CC:

## 2020-08-24 NOTE — PROCEDURES
Department of Obstetrics and Gynecology  Labor and Delivery  NST Triage Note      Pt Name: Khris Chow  MRN: [id number] Estelita Push #: [de-identified]  YOB: 1995  Procedure Performed By: Amaya Cintron MD        SUBJECTIVE: Patient presented at 32+2 weeks from the office for advanced cervical dilation found in the office with back pain. + fetal movement. denies contraction and vaginal bleeding and leaking fluid. OBJECTIVE    Vitals:  /69   Pulse 82   Temp 96.9 °F (36.1 °C) (Oral)   Resp 18     Fetal heart rate:         Baseline Heart Rate:  140        Accelerations:  present       Decelerations:  absent       Variability:  moderate    Contraction frequency: none    The NST was reactive. SVE: unchanged      ASSESSMENT & PLAN: she received BMTZ and was monitored. Her symptoms improved and she did not change her cervix. Reassurance provided. Labor precautions given. Discharged to home in a stable condition and instructed to follow up at her next scheduled appointment.     Cathy Loera 8/24/2020 11:06 AM

## 2020-09-08 PROBLEM — R10.9 ABDOMINAL PAIN DURING PREGNANCY, THIRD TRIMESTER: Status: ACTIVE | Noted: 2020-09-08

## 2020-09-08 PROBLEM — O26.893 ABDOMINAL PAIN DURING PREGNANCY, THIRD TRIMESTER: Status: ACTIVE | Noted: 2020-09-08

## 2020-09-16 ENCOUNTER — ANESTHESIA (OUTPATIENT)
Dept: LABOR AND DELIVERY | Age: 25
End: 2020-09-16
Payer: COMMERCIAL

## 2020-09-16 ENCOUNTER — APPOINTMENT (OUTPATIENT)
Dept: LABOR AND DELIVERY | Age: 25
End: 2020-09-16
Payer: COMMERCIAL

## 2020-09-16 ENCOUNTER — HOSPITAL ENCOUNTER (INPATIENT)
Age: 25
LOS: 3 days | Discharge: HOME | End: 2020-09-19
Attending: OBSTETRICS & GYNECOLOGY | Admitting: OBSTETRICS & GYNECOLOGY
Payer: COMMERCIAL

## 2020-09-16 ENCOUNTER — ANESTHESIA EVENT (OUTPATIENT)
Dept: LABOR AND DELIVERY | Age: 25
End: 2020-09-16
Payer: COMMERCIAL

## 2020-09-16 LAB
ABO: NORMAL
AMPHETAMINE+METHAMPHETAMINE URINE SCREEN: NEGATIVE
ANTIBODY SCREEN: NORMAL
BARBITURATE QUANTITATIVE URINE: NEGATIVE
BASOPHILS # BLD: 0.2 %
BASOPHILS ABSOLUTE: 0 THOU/MM3 (ref 0–0.1)
BENZODIAZEPINE QUANTITATIVE URINE: NEGATIVE
CANNABINOID QUANTITATIVE URINE: NEGATIVE
COCAINE METABOLITE QUANTITATIVE URINE: NEGATIVE
EOSINOPHIL # BLD: 0.4 %
EOSINOPHILS ABSOLUTE: 0 THOU/MM3 (ref 0–0.4)
ERYTHROCYTE [DISTWIDTH] IN BLOOD BY AUTOMATED COUNT: 14.2 % (ref 11.5–14.5)
ERYTHROCYTE [DISTWIDTH] IN BLOOD BY AUTOMATED COUNT: 49.6 FL (ref 35–45)
HCT VFR BLD CALC: 38.3 % (ref 37–47)
HEMOGLOBIN: 12.6 GM/DL (ref 12–16)
IMMATURE GRANS (ABS): 0.06 THOU/MM3 (ref 0–0.07)
IMMATURE GRANULOCYTES: 0.5 %
LYMPHOCYTES # BLD: 27.9 %
LYMPHOCYTES ABSOLUTE: 3.2 THOU/MM3 (ref 1–4.8)
MCH RBC QN AUTO: 31.8 PG (ref 26–33)
MCHC RBC AUTO-ENTMCNC: 32.9 GM/DL (ref 32.2–35.5)
MCV RBC AUTO: 96.7 FL (ref 81–99)
MONOCYTES # BLD: 5.4 %
MONOCYTES ABSOLUTE: 0.6 THOU/MM3 (ref 0.4–1.3)
NUCLEATED RED BLOOD CELLS: 0 /100 WBC
OPIATES, URINE: NEGATIVE
OXYCODONE: NEGATIVE
PHENCYCLIDINE QUANTITATIVE URINE: NEGATIVE
PLATELET # BLD: 233 THOU/MM3 (ref 130–400)
PMV BLD AUTO: 11.4 FL (ref 9.4–12.4)
RBC # BLD: 3.96 MILL/MM3 (ref 4.2–5.4)
RH FACTOR: NORMAL
RPR: NONREACTIVE
SEG NEUTROPHILS: 65.6 %
SEGMENTED NEUTROPHILS ABSOLUTE COUNT: 7.4 THOU/MM3 (ref 1.8–7.7)
WBC # BLD: 11.3 THOU/MM3 (ref 4.8–10.8)

## 2020-09-16 PROCEDURE — 2580000003 HC RX 258: Performed by: OBSTETRICS & GYNECOLOGY

## 2020-09-16 PROCEDURE — 85025 COMPLETE CBC W/AUTO DIFF WBC: CPT

## 2020-09-16 PROCEDURE — 3700000025 EPIDURAL BLOCK: Performed by: ANESTHESIOLOGY

## 2020-09-16 PROCEDURE — 6360000002 HC RX W HCPCS

## 2020-09-16 PROCEDURE — 80307 DRUG TEST PRSMV CHEM ANLYZR: CPT

## 2020-09-16 PROCEDURE — 86850 RBC ANTIBODY SCREEN: CPT

## 2020-09-16 PROCEDURE — 86900 BLOOD TYPING SEROLOGIC ABO: CPT

## 2020-09-16 PROCEDURE — 2500000003 HC RX 250 WO HCPCS: Performed by: ANESTHESIOLOGY

## 2020-09-16 PROCEDURE — 1220000001 HC SEMI PRIVATE L&D R&B

## 2020-09-16 PROCEDURE — 36415 COLL VENOUS BLD VENIPUNCTURE: CPT

## 2020-09-16 PROCEDURE — 6360000002 HC RX W HCPCS: Performed by: OBSTETRICS & GYNECOLOGY

## 2020-09-16 PROCEDURE — 86901 BLOOD TYPING SEROLOGIC RH(D): CPT

## 2020-09-16 PROCEDURE — 86592 SYPHILIS TEST NON-TREP QUAL: CPT

## 2020-09-16 RX ORDER — ONDANSETRON 2 MG/ML
8 INJECTION INTRAMUSCULAR; INTRAVENOUS EVERY 6 HOURS PRN
Status: DISCONTINUED | OUTPATIENT
Start: 2020-09-16 | End: 2020-09-17 | Stop reason: HOSPADM

## 2020-09-16 RX ORDER — BUTORPHANOL TARTRATE 1 MG/ML
INJECTION, SOLUTION INTRAMUSCULAR; INTRAVENOUS
Status: DISCONTINUED
Start: 2020-09-16 | End: 2020-09-16

## 2020-09-16 RX ORDER — PENICILLIN G 3000000 [IU]/50ML
3 INJECTION, SOLUTION INTRAVENOUS EVERY 4 HOURS
Status: DISCONTINUED | OUTPATIENT
Start: 2020-09-16 | End: 2020-09-17 | Stop reason: HOSPADM

## 2020-09-16 RX ORDER — ONDANSETRON 2 MG/ML
4 INJECTION INTRAMUSCULAR; INTRAVENOUS EVERY 6 HOURS PRN
Status: DISCONTINUED | OUTPATIENT
Start: 2020-09-16 | End: 2020-09-17 | Stop reason: HOSPADM

## 2020-09-16 RX ORDER — EPHEDRINE SULFATE 50 MG/ML
5 INJECTION INTRAVENOUS EVERY 5 MIN PRN
Status: DISCONTINUED | OUTPATIENT
Start: 2020-09-16 | End: 2020-09-16 | Stop reason: SDUPTHER

## 2020-09-16 RX ORDER — NALBUPHINE HCL 10 MG/ML
5 AMPUL (ML) INJECTION EVERY 4 HOURS PRN
Status: DISCONTINUED | OUTPATIENT
Start: 2020-09-16 | End: 2020-09-17 | Stop reason: HOSPADM

## 2020-09-16 RX ORDER — SODIUM CHLORIDE, SODIUM LACTATE, POTASSIUM CHLORIDE, CALCIUM CHLORIDE 600; 310; 30; 20 MG/100ML; MG/100ML; MG/100ML; MG/100ML
INJECTION, SOLUTION INTRAVENOUS CONTINUOUS
Status: DISCONTINUED | OUTPATIENT
Start: 2020-09-16 | End: 2020-09-17

## 2020-09-16 RX ORDER — ACETAMINOPHEN 325 MG/1
650 TABLET ORAL EVERY 4 HOURS PRN
Status: DISCONTINUED | OUTPATIENT
Start: 2020-09-16 | End: 2020-09-17 | Stop reason: HOSPADM

## 2020-09-16 RX ORDER — EPHEDRINE SULFATE/0.9% NACL/PF 50 MG/5 ML
SYRINGE (ML) INTRAVENOUS
Status: DISCONTINUED
Start: 2020-09-16 | End: 2020-09-17

## 2020-09-16 RX ORDER — EPHEDRINE SULFATE/0.9% NACL/PF 50 MG/5 ML
5 SYRINGE (ML) INTRAVENOUS EVERY 5 MIN PRN
Status: DISCONTINUED | OUTPATIENT
Start: 2020-09-16 | End: 2020-09-17

## 2020-09-16 RX ORDER — DIPHENHYDRAMINE HYDROCHLORIDE 50 MG/ML
25 INJECTION INTRAMUSCULAR; INTRAVENOUS EVERY 4 HOURS PRN
Status: DISCONTINUED | OUTPATIENT
Start: 2020-09-16 | End: 2020-09-17 | Stop reason: HOSPADM

## 2020-09-16 RX ORDER — METHYLERGONOVINE MALEATE 0.2 MG/ML
200 INJECTION INTRAVENOUS PRN
Status: DISCONTINUED | OUTPATIENT
Start: 2020-09-16 | End: 2020-09-17 | Stop reason: HOSPADM

## 2020-09-16 RX ORDER — MISOPROSTOL 200 UG/1
1000 TABLET ORAL PRN
Status: DISCONTINUED | OUTPATIENT
Start: 2020-09-16 | End: 2020-09-17 | Stop reason: HOSPADM

## 2020-09-16 RX ORDER — FENTANYL CITRATE 50 UG/ML
25 INJECTION, SOLUTION INTRAMUSCULAR; INTRAVENOUS
Status: DISCONTINUED | OUTPATIENT
Start: 2020-09-16 | End: 2020-09-17

## 2020-09-16 RX ORDER — SODIUM CHLORIDE 0.9 % (FLUSH) 0.9 %
10 SYRINGE (ML) INJECTION PRN
Status: DISCONTINUED | OUTPATIENT
Start: 2020-09-16 | End: 2020-09-17 | Stop reason: HOSPADM

## 2020-09-16 RX ORDER — IBUPROFEN 800 MG/1
800 TABLET ORAL EVERY 8 HOURS PRN
Status: DISCONTINUED | OUTPATIENT
Start: 2020-09-16 | End: 2020-09-17 | Stop reason: HOSPADM

## 2020-09-16 RX ORDER — NALOXONE HYDROCHLORIDE 0.4 MG/ML
0.4 INJECTION, SOLUTION INTRAMUSCULAR; INTRAVENOUS; SUBCUTANEOUS PRN
Status: DISCONTINUED | OUTPATIENT
Start: 2020-09-16 | End: 2020-09-17 | Stop reason: HOSPADM

## 2020-09-16 RX ORDER — TERBUTALINE SULFATE 1 MG/ML
0.25 INJECTION, SOLUTION SUBCUTANEOUS
Status: ACTIVE | OUTPATIENT
Start: 2020-09-16 | End: 2020-09-16

## 2020-09-16 RX ORDER — EPHEDRINE SULFATE 50 MG/ML
10 INJECTION INTRAVENOUS ONCE
Status: COMPLETED | OUTPATIENT
Start: 2020-09-16 | End: 2020-09-16

## 2020-09-16 RX ORDER — LIDOCAINE HYDROCHLORIDE 10 MG/ML
30 INJECTION, SOLUTION EPIDURAL; INFILTRATION; INTRACAUDAL; PERINEURAL PRN
Status: DISCONTINUED | OUTPATIENT
Start: 2020-09-16 | End: 2020-09-17 | Stop reason: HOSPADM

## 2020-09-16 RX ORDER — SEVOFLURANE 250 ML/250ML
1 LIQUID RESPIRATORY (INHALATION) CONTINUOUS PRN
Status: DISCONTINUED | OUTPATIENT
Start: 2020-09-16 | End: 2020-09-17 | Stop reason: HOSPADM

## 2020-09-16 RX ORDER — SODIUM CHLORIDE 0.9 % (FLUSH) 0.9 %
10 SYRINGE (ML) INJECTION EVERY 12 HOURS SCHEDULED
Status: DISCONTINUED | OUTPATIENT
Start: 2020-09-16 | End: 2020-09-17 | Stop reason: HOSPADM

## 2020-09-16 RX ORDER — FENTANYL CITRATE 50 UG/ML
INJECTION, SOLUTION INTRAMUSCULAR; INTRAVENOUS
Status: COMPLETED
Start: 2020-09-16 | End: 2020-09-16

## 2020-09-16 RX ORDER — BUTORPHANOL TARTRATE 1 MG/ML
1 INJECTION, SOLUTION INTRAMUSCULAR; INTRAVENOUS
Status: DISCONTINUED | OUTPATIENT
Start: 2020-09-16 | End: 2020-09-16 | Stop reason: ALTCHOICE

## 2020-09-16 RX ADMIN — PENICILLIN G 3 MILLION UNITS: 3000000 INJECTION, SOLUTION INTRAVENOUS at 15:03

## 2020-09-16 RX ADMIN — SODIUM CHLORIDE, POTASSIUM CHLORIDE, SODIUM LACTATE AND CALCIUM CHLORIDE: 600; 310; 30; 20 INJECTION, SOLUTION INTRAVENOUS at 17:34

## 2020-09-16 RX ADMIN — Medication 1 MILLI-UNITS/MIN: at 06:22

## 2020-09-16 RX ADMIN — PENICILLIN G 3 MILLION UNITS: 3000000 INJECTION, SOLUTION INTRAVENOUS at 19:16

## 2020-09-16 RX ADMIN — EPHEDRINE SULFATE 10 MG: 50 INJECTION, SOLUTION INTRAVENOUS at 17:05

## 2020-09-16 RX ADMIN — EPHEDRINE SULFATE 5 MG: 50 INJECTION, SOLUTION INTRAVENOUS at 17:38

## 2020-09-16 RX ADMIN — PENICILLIN G 3 MILLION UNITS: 3000000 INJECTION, SOLUTION INTRAVENOUS at 23:40

## 2020-09-16 RX ADMIN — FENTANYL CITRATE 25 MCG: 50 INJECTION, SOLUTION INTRAMUSCULAR; INTRAVENOUS at 12:41

## 2020-09-16 RX ADMIN — SODIUM CHLORIDE, POTASSIUM CHLORIDE, SODIUM LACTATE AND CALCIUM CHLORIDE: 600; 310; 30; 20 INJECTION, SOLUTION INTRAVENOUS at 14:37

## 2020-09-16 RX ADMIN — PENICILLIN G 3 MILLION UNITS: 3000000 INJECTION, SOLUTION INTRAVENOUS at 11:27

## 2020-09-16 RX ADMIN — ONDANSETRON 8 MG: 2 INJECTION INTRAMUSCULAR; INTRAVENOUS at 19:50

## 2020-09-16 RX ADMIN — DEXTROSE MONOHYDRATE 5 MILLION UNITS: 5 INJECTION INTRAVENOUS at 06:43

## 2020-09-16 RX ADMIN — SODIUM CHLORIDE, POTASSIUM CHLORIDE, SODIUM LACTATE AND CALCIUM CHLORIDE: 600; 310; 30; 20 INJECTION, SOLUTION INTRAVENOUS at 20:59

## 2020-09-16 RX ADMIN — SODIUM CHLORIDE, POTASSIUM CHLORIDE, SODIUM LACTATE AND CALCIUM CHLORIDE: 600; 310; 30; 20 INJECTION, SOLUTION INTRAVENOUS at 05:48

## 2020-09-16 RX ADMIN — Medication 18 ML/HR: at 14:06

## 2020-09-16 RX ADMIN — SODIUM CHLORIDE, POTASSIUM CHLORIDE, SODIUM LACTATE AND CALCIUM CHLORIDE: 600; 310; 30; 20 INJECTION, SOLUTION INTRAVENOUS at 11:47

## 2020-09-16 RX ADMIN — EPHEDRINE SULFATE 5 MG: 50 INJECTION, SOLUTION INTRAVENOUS at 17:25

## 2020-09-16 RX ADMIN — SODIUM CHLORIDE, POTASSIUM CHLORIDE, SODIUM LACTATE AND CALCIUM CHLORIDE: 600; 310; 30; 20 INJECTION, SOLUTION INTRAVENOUS at 23:39

## 2020-09-16 ASSESSMENT — PAIN DESCRIPTION - DESCRIPTORS: DESCRIPTORS: CRAMPING

## 2020-09-16 ASSESSMENT — PAIN SCALES - GENERAL: PAINLEVEL_OUTOF10: 5

## 2020-09-16 NOTE — FLOWSHEET NOTE
Dr Aydee Madsen on unit for a delivery and updated on late decels after epidural. Pitocin off. Oxygen on with fluid bolus. Decels resolved. Continue current plan of care.

## 2020-09-16 NOTE — FLOWSHEET NOTE
Pt arrives for a scheduled induction \"because the placenta is deteriorating\". States has good fetal movement. Denies any bleeding or leaking of fluid. Oriented to room and gown given to ortiz into. Patient to bathroom to void, informed of maternal drug testing policy in place on all laboring patients. Verbal consent received, paper consent to be signed and urine to be sent. Explained patients right to have family, representative or physician notified of their admission. Patient has declines for physician to be notified. Patient has declines for family/representative to be notified.

## 2020-09-16 NOTE — ANESTHESIA PRE PROCEDURE
Department of Anesthesiology  Preprocedure Note       Name:  Sidney Rankin   Age:  26 y. o.  :  1995                                          MRN:  [id number]         Date:  2020      Surgeon: * No surgeons listed *    Procedure: * No procedures listed *    Medications prior to admission:   Prior to Admission medications    Medication Sig Start Date End Date Taking?  Authorizing Provider   Prenatal MV-Min-Fe Fum-FA-DHA (PRENATAL 1 PO) Take by mouth   Yes Historical Provider, MD   ALBUTEROL SULFATE IN Inhale into the lungs    Historical Provider, MD       Current medications:    Current Facility-Administered Medications   Medication Dose Route Frequency Provider Last Rate Last Dose    lactated ringers infusion   Intravenous Continuous Kaylee Antonio  mL/hr at 20 1147      sodium chloride flush 0.9 % injection 10 mL  10 mL Intravenous 2 times per day Kaylee Antonio MD        sodium chloride flush 0.9 % injection 10 mL  10 mL Intravenous PRN Kaylee Antonio MD        lidocaine PF 1 % injection 30 mL  30 mL Other PRN Kaylee Antonio MD        nitrous oxide 50% inhalation 1 each  1 each Inhalation Continuous PRN Kaylee Antonio MD        acetaminophen (TYLENOL) tablet 650 mg  650 mg Oral Q4H PRN Kaylee Antonio MD        ibuprofen (ADVIL;MOTRIN) tablet 800 mg  800 mg Oral Q8H PRN Kaylee Antonio MD        oxytocin (PITOCIN) 30 units in 500 mL infusion  1 nghia-units/min Intravenous Continuous Kaylee Antonio MD 6 mL/hr at 20 1230 6 nghia-units/min at 20 1230    diphenhydrAMINE (BENADRYL) injection 25 mg  25 mg Intravenous Q4H PRADELSO Antonio MD        ondansetron (ZOFRAN) injection 8 mg  8 mg Intravenous Q6H PRN Kaylee Antonio MD        terbutaline (BRETHINE) injection 0.25 mg  0.25 mg Subcutaneous Once PRN Kaylee Antonio MD        oxytocin (PITOCIN) 30 units in 500 mL infusion  1 nghia-units/min Intravenous Continuous PRN Vicki Hurtado MD        methylergonovine (METHERGINE) injection 200 mcg  200 mcg Intramuscular PRN Vicki Hurtado MD        miSOPROStol (CYTOTEC) tablet 1,000 mcg  1,000 mcg Rectal PRN Lewanda Remedies, MD        witch hazel-glycerin (TUCKS) pad   Topical PRN Vicki Hurtado MD        benzocaine-menthol (DERMOPLAST) 20-0.5 % spray   Topical PRN Vicki Hurtado MD        penicillin G potassium IVPB 3 Million Units  3 Million Units Intravenous Q4H Vicki Hurtado MD   Stopped at 20 1157    fentaNYL (SUBLIMAZE) injection 25 mcg  25 mcg Intravenous Q2H PRN Beau Solis MD   25 mcg at 20 1241    fentaNYL 750 mcg, ropivacaine 0.1% in sodium chloride 0.9% 265mL (OB) epidural  18 mL/hr Epidural Continuous Weslaco Ferrer Heitmeyer, DO        naloxone [facility]) injection 0.4 mg  0.4 mg Intravenous PRN Betty Ferrer Heitmeyer, DO        nalbuphine (NUBAIN) injection 5 mg  5 mg Intravenous Q4H PRN Weslaco Ferrer Heitmeyer, DO        ondansetron [facility]) injection 4 mg  4 mg Intravenous Q6H PRN Betty Ferrer Heitmeyer, DO           Allergies:     Allergies   Allergen Reactions    Codeine Shortness Of Breath    Suprax [Cefixime] Hives     All over body       Problem List:    Patient Active Problem List   Diagnosis Code    Abdominal pain R10.9    Decreased fetal movement affecting management of mother, antepartum O40.1    Threatened  labor, antepartum O47.00    Pregnancy with complication G81.26    Pregnancy complication before birth O99.89    Abdominal pain during pregnancy, third trimester O26.893, R10.9       Past Medical History:        Diagnosis Date    Acid reflux     Asthma     Cyclic vomiting syndrome        Past Surgical History:        Procedure Laterality Date    ANKLE FRACTURE SURGERY Right 2014    EYE SURGERY      TONSILLECTOMY         Social History:    Social History     Tobacco Use    Smoking status: Never Smoker    Smokeless tobacco: Never Used   Substance Use Topics    Alcohol use: Not Currently                                Counseling given: Not Answered      Vital Signs (Current):   Vitals:    09/16/20 1400 09/16/20 1405 09/16/20 1410 09/16/20 1415   BP: (!) 136/99 117/64 111/66 111/64   Pulse: 88 83 71 73   Resp: 20 20 18 (P) 18   Temp:       TempSrc:       SpO2: 100% 99% 100% 99%   Weight:       Height:                                                  BP Readings from Last 3 Encounters:   09/16/20 111/64   09/08/20 111/69   08/16/20 111/72       NPO Status:                          Time of last solid consumption: 0430                                                 Date of last solid food consumption: 09/16/20    BMI:   Wt Readings from Last 3 Encounters:   09/16/20 181 lb (82.1 kg)   09/08/20 180 lb (81.6 kg)   08/16/20 174 lb (78.9 kg)     Body mass index is 36.56 kg/m². CBC:   Lab Results   Component Value Date    WBC 11.3 09/16/2020    RBC 3.96 09/16/2020    HGB 12.6 09/16/2020    HCT 38.3 09/16/2020    MCV 96.7 09/16/2020    RDW 13.4 03/06/2019     09/16/2020       CMP:   Lab Results   Component Value Date     04/11/2020    K 3.5 04/11/2020    CL 97 04/11/2020    CO2 24 04/11/2020    BUN 7 04/11/2020    CREATININE 0.4 04/11/2020    GFRAA >60 03/06/2019    LABGLOM >90 04/11/2020    GLUCOSE 87 04/11/2020    PROT 7.9 04/11/2020    CALCIUM 9.7 04/11/2020    BILITOT 0.3 04/11/2020    ALKPHOS 65 04/11/2020    AST 19 04/11/2020    ALT 8 04/11/2020       POC Tests: No results for input(s): POCGLU, POCNA, POCK, POCCL, POCBUN, POCHEMO, POCHCT in the last 72 hours.     Coags: No results found for: PROTIME, INR, APTT    HCG (If Applicable): No results found for: PREGTESTUR, PREGSERUM, HCG, HCGQUANT     ABGs: No results found for: PHART, PO2ART, RZB1GZI, BLD3ONB, BEART, W8YNURLS     Type & Screen (If Applicable):  Lab Results   Component Value Date    LAB POS 09/16/2020       Drug/Infectious Status (If Applicable):  No results found for: HIV, HEPCAB    COVID-19 Screening (If Applicable): No results found for: COVID19      Anesthesia Evaluation  Patient summary reviewed and Nursing notes reviewed no history of anesthetic complications:   Airway: Mallampati: III  TM distance: >3 FB   Neck ROM: full  Mouth opening: > = 3 FB Dental:          Pulmonary:normal exam  breath sounds clear to auscultation  (+) asthma:                            Cardiovascular:Negative CV ROS  Exercise tolerance: good (>4 METS),                     Neuro/Psych:   Negative Neuro/Psych ROS              GI/Hepatic/Renal:   (+) GERD:,           Endo/Other: Negative Endo/Other ROS             Pt had no PAT visit       Abdominal:           Vascular: negative vascular ROS. Anesthesia Plan      epidural     ASA 2             Anesthetic plan and risks discussed with patient, spouse and mother.                       [address], DO   9/16/2020

## 2020-09-16 NOTE — FLOWSHEET NOTE
Moaning out with contractions. Discussed other pain options. States she would like an epidural.  Plan of care discussed. IV fluids increased. Mena Snell CRNA on unit and aware of pt request. Dr Juliette Hill sent page pt is 4cm. Continue current plan of care.

## 2020-09-16 NOTE — PLAN OF CARE
Problem: Falls - Risk of:  Goal: Will remain free from falls  Description: Will remain free from falls  9/16/2020 0845 by Chu Yoder RN  Outcome: Ongoing  Note: Call light within reach. BRP with assist. Side rails up x2.  and mother in law in room. Problem: Anxiety:  Goal: Level of anxiety will decrease  Description: Level of anxiety will decrease  9/16/2020 0845 by Chu Yoder RN  Outcome: Ongoing  Note: Calm and co operative. Plan of care and procedures explained. Problem: Breathing Pattern - Ineffective:  Goal: Able to breathe comfortably  Description: Able to breathe comfortably  9/16/2020 0845 by Chu Yoder RN  Outcome: Ongoing  Note: Respirations easy and unlabored. Vital signs stable. Problem: Fluid Volume - Imbalance:  Goal: Absence of intrapartum hemorrhage signs and symptoms  Description: Absence of intrapartum hemorrhage signs and symptoms  9/16/2020 0845 by Chu Yoder RN  Outcome: Ongoing  Note: No vaginal bleeding. IV fluids infusing. Vital signs stable. Problem: Labor Process - Prolonged:  Goal: Uterine contractions within specified parameters  Description: Uterine contractions within specified parameters  9/16/2020 0845 by Chu Yoder RN  Outcome: Ongoing  Note: Continuous toco monitoring. Contractions 2-3 minutes apart. Problem: Pain - Acute:  Goal: Pain level will decrease  Description: Pain level will decrease  9/16/2020 0845 by Chu Yoder RN  Outcome: Ongoing  Note: Pain goal 7/10. Denies pain . Plans on iv medications for pain control. Problem: Tissue Perfusion - Uteroplacental, Altered:  Goal: Absence of abnormal fetal heart rate pattern  Description: Absence of abnormal fetal heart rate pattern  9/16/2020 0845 by Chu Yoder RN  Outcome: Ongoing  Note: Continuous fetal monitoring. FHTs reactive.       Problem: Urinary Retention:  Goal: Urinary elimination within specified parameters  Description: Urinary elimination within specified parameters  9/16/2020 0845 by Jenny Arana RN  Outcome: Ongoing  Note: Voiding QS. Problem: Discharge Planning:  Goal: Discharged to appropriate level of care  Description: Discharged to appropriate level of care  9/16/2020 0845 by Jenny Arana RN  Outcome: Ongoing  Note: Kendra Connelly is aware she will remain on labor/delivery unit for 2 hours post delivery then transfer to mom/baby unit for continued care til discharge. Care plan reviewed with patient and . Patient and  verbalize understanding of the plan of care and contribute to goal setting.

## 2020-09-16 NOTE — FLOWSHEET NOTE
Dr Barby Beverly called unit. MD updated on recurrent late decels with low BPs 70-80s/40s after repositioning pt to left side after ve. VE 5cm/0 station. Pt asymptomatic. Pitocin off,oxygen on with fluid bolus. Anesthesia called and ephedrine given. BP stable now and decels resolved. Orders received to restart pitocin. Continue current plan of care.

## 2020-09-16 NOTE — FLOWSHEET NOTE
Dr Guevara Johnson in room. HX and assessment per MD. Time out completed. Pts  and mother in law in room.

## 2020-09-16 NOTE — ANESTHESIA PROCEDURE NOTES
Epidural Block    Patient location during procedure: OB  Start time: 9/16/2020 1:58 PM  End time: 9/16/2020 2:05 PM  Reason for block: labor epidural  Staffing  Anesthesiologist: Chioma Comment, DO  Performed: anesthesiologist   Preanesthetic Checklist  Completed: patient identified, site marked, surgical consent, pre-op evaluation, timeout performed, IV checked, risks and benefits discussed, monitors and equipment checked, anesthesia consent given, oxygen available and patient being monitored  Epidural  Patient position: sitting  Prep: ChloraPrep  Patient monitoring: continuous pulse ox and frequent blood pressure checks  Approach: midline  Location: lumbar (1-5)  Injection technique: JU saline  Provider prep: mask and sterile gloves  Needle  Needle type: Tuohy   Needle gauge: 18 G  Needle length: 3.5 in  Needle insertion depth: 7.5 cm  Catheter type: end hole  Catheter size: 19 G  Catheter at skin depth: 12 cm  Test dose: negative  Assessment  Sensory level: T10  Hemodynamics: stable  Attempts: 1

## 2020-09-16 NOTE — FLOWSHEET NOTE
Dr Josefa Clifford notified of pt here and induction ready. Informed of Suprax allergy with PCN flagged, states to ask pt if she has taken PCN before. Pt states she has taken  PCN and Amoxicillin before and has had no problem. Orders received to give PCN IVPB for +GBS.

## 2020-09-16 NOTE — PLAN OF CARE
Problem: Falls - Risk of:  Goal: Will remain free from falls  Description: Will remain free from falls  9/16/2020 1929 by Keira Almaraz RN  Outcome: Ongoing  Note: Pt. Remains free from falls at this time. IV infusing per order. Side rails up X2. Call light within reach. S.O. At bedside. RN will continue to provide for a safe environment. Problem: Falls - Risk of:  Goal: Absence of physical injury  Description: Absence of physical injury  9/16/2020 1929 by Keira Almaraz RN  Outcome: Ongoing     Problem: Anxiety:  Goal: Level of anxiety will decrease  Description: Level of anxiety will decrease  9/16/2020 1929 by Keira Almaraz RN  Outcome: Ongoing  Note: Pt remains calm about the birthing experience,  at bedside, supportive. All questions/concerns addressed by RN. Problem: Breathing Pattern - Ineffective:  Goal: Able to breathe comfortably  Description: Able to breathe comfortably  9/16/2020 1929 by Keira Almaraz RN  Outcome: Ongoing  Note: No signs of resp distress noted. Sp02 remains greater than 92% on room air. Respirations equal and unlabored. Problem: Fluid Volume - Imbalance:  Goal: Absence of intrapartum hemorrhage signs and symptoms  Description: Absence of intrapartum hemorrhage signs and symptoms  9/16/2020 1929 by Keira Almaraz RN  Outcome: Ongoing  Note: No vaginal bleeding noted, will continue to monitor. Problem: Infection - Intrapartum Infection:  Goal: Will show no infection signs and symptoms  Description: Will show no infection signs and symptoms  9/16/2020 1929 by Keira Almaraz RN  Outcome: Ongoing  Note: Vitals stable, pt remains afebrile. FHT's remain reassuring, will continue to monitor.        Problem: Labor Process - Prolonged:  Goal: Uterine contractions within specified parameters  Description: Uterine contractions within specified parameters  9/16/2020 1929 by Keira Almaraz RN  Outcome: Ongoing  Note: Contractions monitored as ordered and pitocin titrated prn. Problem: Pain - Acute:  Goal: Pain level will decrease  Description: Pain level will decrease  9/16/2020 1929 by Vince Easley RN  Outcome: Ongoing  Note: Pt currently rating pain 0/10 and is comfortable with epidural. Pain goal 7/10. Problem: Tissue Perfusion - Uteroplacental, Altered:  Goal: Absence of abnormal fetal heart rate pattern  Description: Absence of abnormal fetal heart rate pattern  9/16/2020 1929 by Vince Easley RN  Outcome: Ongoing  Note: Fetal tracing remains reactive       Problem: Urinary Retention:  Goal: Urinary elimination within specified parameters  Description: Urinary elimination within specified parameters  9/16/2020 1929 by Vince Easley RN  Outcome: Ongoing  Note: Vang catheter in place and draining adequate amounts of urine. Problem: Discharge Planning:  Goal: Discharged to appropriate level of care  Description: Discharged to appropriate level of care  9/16/2020 1929 by Vince Easley RN  Outcome: Ongoing  Note: Pt aware of 2hr recovery period in L&D and then transfer to mom/baby for the remainder of her stay. Problem: Pain:  Goal: Pain level will decrease  Description: Pain level will decrease  9/16/2020 1929 by Vince Easley RN  Outcome: Ongoing  Note: Pt currently rating pain 0/10 and is comfortable with epidural. Pain goal 7/10. Problem: Pain:  Goal: Control of acute pain  Description: Control of acute pain  Outcome: Ongoing   Care plan reviewed with patient and family. Patient and family verbalize understanding of the plan of care and contribute to goal setting.

## 2020-09-16 NOTE — PLAN OF CARE
Problem: Falls - Risk of:  Goal: Will remain free from falls  Description: Will remain free from falls  Note: Pt to remain from injuries/fall with IV in place, walkway will remain free of clutter. Goal: Absence of physical injury  Description: Absence of physical injury  Note: Side rails up x2. Call light within reach     Problem: Anxiety:  Goal: Level of anxiety will decrease  Description: Level of anxiety will decrease  Note: Pt remains calm about the birthing experience,  at bedside, supportive. All questions/concerns addressed by RN. Problem: Breathing Pattern - Ineffective:  Goal: Able to breathe comfortably  Description: Able to breathe comfortably  Note: No signs of resp distress noted. Sp02 remains greater than 92% on room air. Respirations equal and unlabored. Problem: Fluid Volume - Imbalance:  Goal: Absence of intrapartum hemorrhage signs and symptoms  Description: Absence of intrapartum hemorrhage signs and symptoms  Note: No vaginal bleeding noted, will continue to monitor. Problem: Infection - Intrapartum Infection:  Goal: Will show no infection signs and symptoms  Description: Will show no infection signs and symptoms  Note: Vitals stable, pt remains afebrile. FHT's remain reassuring, will continue to monitor. Problem: Labor Process - Prolonged:  Goal: Uterine contractions within specified parameters  Description: Uterine contractions within specified parameters  Note: Pt is a scheduled induction. Will continue to monitor on Pitocin     Problem: Pain - Acute:  Goal: Pain level will decrease  Description: Pain level will decrease  Note: Pt undecided on pain relief at this time     Problem: Tissue Perfusion - Uteroplacental, Altered:  Goal: Absence of abnormal fetal heart rate pattern  Description: Absence of abnormal fetal heart rate pattern  Note: Fht's regular and strong with accels noted.  Will continue to monitor     Problem: Urinary Retention:  Goal: Urinary elimination within specified parameters  Description: Urinary elimination within specified parameters  Note: Pt voiding sufficiently; will continue to montior      Problem: Discharge Planning:  Goal: Discharged to appropriate level of care  Description: Discharged to appropriate level of care  Note: Discharge education will continue to be discussed working towards vaginal delivery and transfer to [address] [place] [address] reviewed with patient and family. Patient and family verbalize understanding of the plan of care and contribute to goal setting.

## 2020-09-16 NOTE — H&P
[facility]  History and Physical Update    Pt Name: Toshia Landis  MRN: [id number]  YOB: 1995  Date of evaluation: 9/16/2020    [] I have examined the patient and reviewed the H&P/Consult and there are no changes to the patient or plans. [x] I have examined the patient and reviewed the H&P/Consult and have noted the following changes: 25 yo G1 @ 37 wks presents for IOL for IUGR and intermittent elevated S/D ratios. GBS +. Sve: 2/80/-1, AROM for clear fluid. FHTs: 130, mod umair, +accels, no decels toco: q3-4 min. Category I tracing. PCN for GBS. con't to work towards delivery. Discussion with the patient and/ or family for proposed care, treatment, services; benefits, risks, side effects; likelihood of achieving goals and potential problems that may occur during recuperation was had and all questions were answered. Discussion with the patient and/ or family of reasonable alternatives to the proposed care, treatment, services and the discussion of the risks, benefits, side effects related to the alternatives and the risk related to not receiving the proposed care treatment services was also had and all questions were answered. If this is for an elective surgical procedure then The patient was counseled at length about the risks of denzel Covid-19 during their perioperative period and any recovery window from their procedure. The patient was made aware that denzel Covid-19  may worsen their prognosis for recovering from their procedure  and lend to a higher morbidity and/or mortality risk. All material risks, benefits, and reasonable alternatives including postponing the procedure were discussed. The patient  does wish to proceed with the procedure at this time.              Tia Loera MD  Electronically signed 9/16/2020 at 8:10 AM

## 2020-09-16 NOTE — FLOWSHEET NOTE
Dr Noe Petit called unit and updated on pt allergies and need for iv pain meds. Pt does not plan on epidural. Orders received.

## 2020-09-16 NOTE — FLOWSHEET NOTE
Repositioned sitting at edge of bed for epidural placement. Moaning out loudly with ctxs. Support given.

## 2020-09-16 NOTE — FLOWSHEET NOTE
Dr Lani Mckeon pagegeetha. MD called unit and updated on BPs 75/48,84/45,pt asymptomatic but baby is not tolerating it. IV fluid bolus is infusing. MD states he is not on call. Orders received for ephedrine and to follow up with Dr Chico Treviño.

## 2020-09-16 NOTE — PROGRESS NOTES
Updated by nursing staff. S/p IV pain meds. FHTs: 125, mod umair, +accels, early decel  [place]: q3-4 min. Category I tracing. Reactive. con't to work towards delivery.      Tanya Patel  1:29 PM  9/16/2020

## 2020-09-17 PROBLEM — O09.93 SUPERVISION OF HIGH-RISK PREGNANCY, THIRD TRIMESTER: Status: ACTIVE | Noted: 2020-09-17

## 2020-09-17 PROCEDURE — 7200000001 HC VAGINAL DELIVERY

## 2020-09-17 PROCEDURE — 10907ZC DRAINAGE OF AMNIOTIC FLUID, THERAPEUTIC FROM PRODUCTS OF CONCEPTION, VIA NATURAL OR ARTIFICIAL OPENING: ICD-10-PCS | Performed by: OBSTETRICS & GYNECOLOGY

## 2020-09-17 PROCEDURE — 6370000000 HC RX 637 (ALT 250 FOR IP): Performed by: OBSTETRICS & GYNECOLOGY

## 2020-09-17 PROCEDURE — 0KQM0ZZ REPAIR PERINEUM MUSCLE, OPEN APPROACH: ICD-10-PCS | Performed by: OBSTETRICS & GYNECOLOGY

## 2020-09-17 PROCEDURE — 88307 TISSUE EXAM BY PATHOLOGIST: CPT

## 2020-09-17 PROCEDURE — 1220000000 HC SEMI PRIVATE OB R&B

## 2020-09-17 RX ORDER — ZOLPIDEM TARTRATE 10 MG/1
10 TABLET ORAL NIGHTLY PRN
Status: DISCONTINUED | OUTPATIENT
Start: 2020-09-17 | End: 2020-09-19 | Stop reason: HOSPADM

## 2020-09-17 RX ORDER — SODIUM CHLORIDE 0.9 % (FLUSH) 0.9 %
10 SYRINGE (ML) INJECTION EVERY 12 HOURS SCHEDULED
Status: DISCONTINUED | OUTPATIENT
Start: 2020-09-17 | End: 2020-09-19 | Stop reason: HOSPADM

## 2020-09-17 RX ORDER — FERROUS SULFATE 325(65) MG
325 TABLET ORAL
Status: DISCONTINUED | OUTPATIENT
Start: 2020-09-18 | End: 2020-09-19 | Stop reason: HOSPADM

## 2020-09-17 RX ORDER — DIPHENHYDRAMINE HCL 25 MG
25 TABLET ORAL EVERY 6 HOURS PRN
Status: DISCONTINUED | OUTPATIENT
Start: 2020-09-17 | End: 2020-09-19 | Stop reason: HOSPADM

## 2020-09-17 RX ORDER — CARBOPROST TROMETHAMINE 250 UG/ML
250 INJECTION, SOLUTION INTRAMUSCULAR
Status: DISPENSED | OUTPATIENT
Start: 2020-09-17 | End: 2020-09-17

## 2020-09-17 RX ORDER — CARBOPROST TROMETHAMINE 250 UG/ML
250 INJECTION, SOLUTION INTRAMUSCULAR PRN
Status: DISCONTINUED | OUTPATIENT
Start: 2020-09-17 | End: 2020-09-17 | Stop reason: SDUPTHER

## 2020-09-17 RX ORDER — ONDANSETRON 4 MG/1
4 TABLET, ORALLY DISINTEGRATING ORAL EVERY 6 HOURS PRN
Status: DISCONTINUED | OUTPATIENT
Start: 2020-09-17 | End: 2020-09-19 | Stop reason: HOSPADM

## 2020-09-17 RX ORDER — SODIUM CHLORIDE 0.9 % (FLUSH) 0.9 %
10 SYRINGE (ML) INJECTION PRN
Status: DISCONTINUED | OUTPATIENT
Start: 2020-09-17 | End: 2020-09-19 | Stop reason: HOSPADM

## 2020-09-17 RX ORDER — ACETAMINOPHEN 325 MG/1
650 TABLET ORAL EVERY 4 HOURS PRN
Status: DISCONTINUED | OUTPATIENT
Start: 2020-09-17 | End: 2020-09-19 | Stop reason: HOSPADM

## 2020-09-17 RX ORDER — ONDANSETRON 2 MG/ML
8 INJECTION INTRAMUSCULAR; INTRAVENOUS EVERY 8 HOURS PRN
Status: DISCONTINUED | OUTPATIENT
Start: 2020-09-17 | End: 2020-09-19 | Stop reason: HOSPADM

## 2020-09-17 RX ORDER — DOCUSATE SODIUM 100 MG/1
100 CAPSULE, LIQUID FILLED ORAL 2 TIMES DAILY
Status: DISCONTINUED | OUTPATIENT
Start: 2020-09-17 | End: 2020-09-19 | Stop reason: HOSPADM

## 2020-09-17 RX ORDER — DIAPER,BRIEF,INFANT-TODD,DISP
EACH MISCELLANEOUS 2 TIMES DAILY
Status: DISCONTINUED | OUTPATIENT
Start: 2020-09-17 | End: 2020-09-19 | Stop reason: HOSPADM

## 2020-09-17 RX ORDER — METHYLERGONOVINE MALEATE 0.2 MG/ML
200 INJECTION INTRAVENOUS PRN
Status: DISCONTINUED | OUTPATIENT
Start: 2020-09-17 | End: 2020-09-19 | Stop reason: HOSPADM

## 2020-09-17 RX ORDER — HYDROCODONE BITARTRATE AND ACETAMINOPHEN 5; 325 MG/1; MG/1
2 TABLET ORAL EVERY 4 HOURS PRN
Status: DISCONTINUED | OUTPATIENT
Start: 2020-09-17 | End: 2020-09-17

## 2020-09-17 RX ORDER — IBUPROFEN 800 MG/1
800 TABLET ORAL EVERY 8 HOURS
Status: DISCONTINUED | OUTPATIENT
Start: 2020-09-17 | End: 2020-09-19 | Stop reason: HOSPADM

## 2020-09-17 RX ORDER — MODIFIED LANOLIN
OINTMENT (GRAM) TOPICAL PRN
Status: DISCONTINUED | OUTPATIENT
Start: 2020-09-17 | End: 2020-09-19 | Stop reason: HOSPADM

## 2020-09-17 RX ADMIN — ACETAMINOPHEN 650 MG: 325 TABLET ORAL at 20:53

## 2020-09-17 RX ADMIN — HYDROCORTISONE: 1 CREAM TOPICAL at 20:54

## 2020-09-17 RX ADMIN — BENZOCAINE AND LEVOMENTHOL: 200; 5 SPRAY TOPICAL at 03:11

## 2020-09-17 RX ADMIN — IBUPROFEN 800 MG: 800 TABLET, FILM COATED ORAL at 06:49

## 2020-09-17 RX ADMIN — DOCUSATE SODIUM 100 MG: 100 CAPSULE, LIQUID FILLED ORAL at 20:14

## 2020-09-17 RX ADMIN — IBUPROFEN 800 MG: 800 TABLET, FILM COATED ORAL at 18:46

## 2020-09-17 ASSESSMENT — PAIN SCALES - GENERAL
PAINLEVEL_OUTOF10: 4
PAINLEVEL_OUTOF10: 4
PAINLEVEL_OUTOF10: 3

## 2020-09-17 NOTE — PLAN OF CARE
Problem: Discharge Planning:  Goal: Discharged to appropriate level of care  Description: Discharged to appropriate level of care  Outcome: Ongoing  Note: Discharge planning continues     Problem: Constipation:  Goal: Bowel elimination is within specified parameters  Description: Bowel elimination is within specified parameters  Outcome: Ongoing  Note: No bm passing gas     Problem: Fluid Volume - Imbalance:  Goal: Absence of postpartum hemorrhage signs and symptoms  Description: Absence of postpartum hemorrhage signs and symptoms  Outcome: Ongoing  Note: Small amount of lochia, no clots reported     Problem: Infection - Risk of, Puerperal Infection:  Goal: Will show no infection signs and symptoms  Description: Will show no infection signs and symptoms  Outcome: Ongoing  Note: Vitals stable     Problem: Mood - Altered:  Goal: Mood stable  Description: Mood stable  Outcome: Ongoing  Note: Stable mood expresses needs     Problem: Pain - Acute:  Goal: Pain level will decrease  Description: Pain level will decrease  Outcome: Ongoing  Note: Pain controlled well with Motrin and rest     Problem: Falls - Risk of:  Goal: Will remain free from falls  Description: Will remain free from falls  Outcome: Ongoing  Note: No falls this shit     Problem: Falls - Risk of:  Goal: Absence of physical injury  Description: Absence of physical injury  Outcome: Ongoing  Note: No injury this shift   Care plan reviewed with patient and SO. Patient and SO verbalize understanding of the plan of care and contribute to goal setting.

## 2020-09-17 NOTE — L&D DELIVERY NOTE
Brief Operative Report      Pre-operative Diagnosis:  Supposed IUGR Induction    Post-operative Diagnosis:  Same    Procedure:  VAD repair of 2nd degree vaginal laceration x 2    Surgeon: Antonio Wilkes    Assistant Surgeons: none     Anesthesia:  Epidural anesthesia    Estimated blood loss:  400 ml     Findings: See Operative Dictation    Complications:  none      See dictated operative report for full details.       Ale Nice MD

## 2020-09-17 NOTE — OP NOTE
were two  interrupted stitches of 2-0 chromic placed at each site with excellent  resulting hemostasis. The remainder of the cervix, vagina, perineum  were without laceration. The uterus was clamping down well at that  time. Total EBL was 400 mL. Male infant weighed 5 pounds 8 ounces, had  Apgars of 9 at one minute and 9 at five minutes, was in the room bonding  at the time of the dictation.         Pratik Sexton M.D.    D: 09/17/2020 1:30:27       T: 09/17/2020 2:22:36     [email or web address]  Job#: [id number]     Doc#: [id number]    CC:

## 2020-09-17 NOTE — FLOWSHEET NOTE
Primary RN at bedside throughout second stage of labor starting at [facility] until delivery at [address] [facility],[address] continuously monitoring FHR and providing interventions as needed.

## 2020-09-17 NOTE — FLOWSHEET NOTE
Patient transferred to postpartum via w/c by BERT Chavira RN and settled into room. Pt oriented to folder and postpartum care. Oriented to call light, phone and ordering meals. RN's name and phone number posted for pt. Siderails up x2. Pt oriented to equipment. Report received from Mariano Hale RN and care assumed at this time. Pt included in discussion and all questions answered.

## 2020-09-18 PROCEDURE — 6370000000 HC RX 637 (ALT 250 FOR IP): Performed by: OBSTETRICS & GYNECOLOGY

## 2020-09-18 PROCEDURE — 1220000000 HC SEMI PRIVATE OB R&B

## 2020-09-18 RX ADMIN — HYDROCORTISONE: 1 CREAM TOPICAL at 09:26

## 2020-09-18 RX ADMIN — BENZOCAINE AND LEVOMENTHOL: 200; 5 SPRAY TOPICAL at 15:26

## 2020-09-18 RX ADMIN — IBUPROFEN 800 MG: 800 TABLET, FILM COATED ORAL at 22:17

## 2020-09-18 RX ADMIN — DOCUSATE SODIUM 100 MG: 100 CAPSULE, LIQUID FILLED ORAL at 09:27

## 2020-09-18 RX ADMIN — HYDROCORTISONE: 1 CREAM TOPICAL at 20:03

## 2020-09-18 RX ADMIN — IBUPROFEN 800 MG: 800 TABLET, FILM COATED ORAL at 09:27

## 2020-09-18 RX ADMIN — ACETAMINOPHEN 650 MG: 325 TABLET ORAL at 23:17

## 2020-09-18 RX ADMIN — DOCUSATE SODIUM 100 MG: 100 CAPSULE, LIQUID FILLED ORAL at 20:03

## 2020-09-18 ASSESSMENT — PAIN SCALES - GENERAL
PAINLEVEL_OUTOF10: 2
PAINLEVEL_OUTOF10: 3
PAINLEVEL_OUTOF10: 3

## 2020-09-18 NOTE — PLAN OF CARE
Problem: Falls - Risk of:  Goal: Will remain free from falls  Description: Will remain free from falls  9/17/2020 2151 by Tana Rosario RN  Outcome: Ongoing  Note: Patient's gait is steady when up. No falls this shift. Problem: Falls - Risk of:  Goal: Absence of physical injury  Description: Absence of physical injury  9/17/2020 2151 by Tana Rosario RN  Outcome: Ongoing  Note: No injury this shift. Problem: Discharge Planning:  Goal: Discharged to appropriate level of care  Description: Discharged to appropriate level of care  9/17/2020 2151 by Tana Rosario RN  Outcome: Ongoing  Note: Remains in hospital, discussed possible discharge needs. Problem: Constipation:  Goal: Bowel elimination is within specified parameters  Description: Bowel elimination is within specified parameters  9/17/2020 2151 by Tana Rosario RN  Outcome: Ongoing  Note: Taking stool softeners and increasing fiber and fluid in diet. Ambulation encouraged. Problem: Fluid Volume - Imbalance:  Goal: Absence of postpartum hemorrhage signs and symptoms  Description: Absence of postpartum hemorrhage signs and symptoms  9/17/2020 2151 by Tana Rosario RN  Outcome: Ongoing  Note: Vaginal bleeding WNL, no clots or foul odors. Problem: Infection - Risk of, Puerperal Infection:  Goal: Will show no infection signs and symptoms  Description: Will show no infection signs and symptoms  9/17/2020 2151 by Tana Rosario RN  Outcome: Ongoing  Note: Patient shows no sign/symptoms of infection. Problem: Mood - Altered:  Goal: Mood stable  Description: Mood stable  9/17/2020 2151 by Tana Rosario RN  Outcome: Ongoing  Note: Bonding with baby, participating in infant care. Problem: Pain - Acute:  Goal: Pain level will decrease  Description: Pain level will decrease  9/17/2020 2151 by Tana Rosario RN  Outcome: Ongoing  Note: Pain controlled with po meds.  Discussed ice for perineal pain or the use of warm blanket/heating pad for uterine cramps. Pt states her pain goal 3/10 has been met. Care plan reviewed with patient and she contributes to goal setting and voices understanding of plan of care.

## 2020-09-18 NOTE — ANESTHESIA POSTPROCEDURE EVALUATION
Department of Anesthesiology  Postprocedure Note    Patient: Joaquin Spain  MRN: [id number]  YOB: 1995  Date of evaluation: 9/18/2020  Time:  9:53 AM     Procedure Summary     Date:  09/16/20 Room / Location:      Anesthesia Start:  9654 Anesthesia Stop:  09/17/20 0104    Procedure:  Labor Analgesia Diagnosis:      Scheduled Providers:   Responsible Provider:  Xiomara Montes DO    Anesthesia Type:  epidural ASA Status:  2          Anesthesia Type: No value filed. Liz Phase I: Liz Score: 9    Liz Phase II: Liz Score: 10    Last vitals: Reviewed and per EMR flowsheets.        Anesthesia Post Evaluation    Patient location during evaluation: floor  Patient participation: complete - patient participated  Level of consciousness: awake and alert  Airway patency: patent  Nausea & Vomiting: no nausea and no vomiting  Complications: no  Cardiovascular status: hemodynamically stable  Respiratory status: acceptable, room air and spontaneous ventilation  Hydration status: stable

## 2020-09-18 NOTE — PROGRESS NOTES
Department of Obstetrics and Gynecology  Progress Note      S: doing well. No complaints. Lochia appropriate. Denies cp, sob, ct.     O:     Gen: no acute distress, ambulating at bedside. Resp: breathing unlabored       A: 26 y.o.   PPD#0 s/p VAVD, doing well. P:   1. B POS   2. Con't postpartum care   3.  Anticipate d/c home PPD#2    Kale Garcia  8:19 AM  2020

## 2020-09-18 NOTE — PLAN OF CARE
Problem: Discharge Planning:  Goal: Discharged to appropriate level of care  Description: Discharged to appropriate level of care  9/18/2020 1050 by Wilian Geller RN  Outcome: Ongoing  Note: Discharge planning continues     Problem: Constipation:  Goal: Bowel elimination is within specified parameters  Description: Bowel elimination is within specified parameters  9/18/2020 1050 by Wilian Geller RN  Outcome: Ongoing     Problem: Fluid Volume - Imbalance:  Goal: Absence of postpartum hemorrhage signs and symptoms  Description: Absence of postpartum hemorrhage signs and symptoms  9/18/2020 1050 by Wilian Geller RN  Outcome: Ongoing  Note: Small amount of lochia, no clots reported     Problem: Infection - Risk of, Puerperal Infection:  Goal: Will show no infection signs and symptoms  Description: Will show no infection signs and symptoms  9/18/2020 1050 by Wilian Geller RN  Outcome: Ongoing  Note: Vitals stable     Problem: Mood - Altered:  Goal: Mood stable  Description: Mood stable  9/18/2020 1050 by Wilian Geller RN  Outcome: Ongoing  Note: Stable mood expresses needs     Problem: Pain - Acute:  Goal: Pain level will decrease  Description: Pain level will decrease  9/18/2020 1050 by Wilian Geller RN  Outcome: Ongoing  Note: Pain controlled well with Tylenol, Motrin, rest and repositioning, pain goal 5     Problem: Falls - Risk of:  Goal: Will remain free from falls  Description: Will remain free from falls  9/18/2020 1050 by Wilian Geller RN  Outcome: Ongoing  Note: No falls this shift     Problem: Falls - Risk of:  Goal: Absence of physical injury  Description: Absence of physical injury  9/18/2020 1050 by Wilian Geller RN  Outcome: Ongoing  Note: No injury this shift   Care plan reviewed with patient and SO. Patient and SO verbalize understanding of the plan of care and contribute to goal setting.

## 2020-09-18 NOTE — PROGRESS NOTES
Department of Obstetrics and Gynecology  Progress Note      S: doing well. No complaints. Lochia appropriate. Denies cp, sob, ct. Bottle feeding  male infant. O:   Vitals:    20 0145   BP: (!) 105/59   Pulse: 73   Resp: 16   Temp: 97.8 °F (36.6 °C)   SpO2: 95%       Gen: no acute distress   Resp: breathing unlabored   Abd: soft, nondistended, fundus firm below umbilicus    A: 26 y.o.   PPD#1 s/p VAVD, doing well. P:   1. B POS   2. Con't postpartum care   3.  Anticipate d/c home tomorrow    Ricco Late  8:18 AM  2020

## 2020-09-19 VITALS
BODY MASS INDEX: 36.49 KG/M2 | DIASTOLIC BLOOD PRESSURE: 68 MMHG | WEIGHT: 181 LBS | HEART RATE: 78 BPM | RESPIRATION RATE: 16 BRPM | SYSTOLIC BLOOD PRESSURE: 118 MMHG | HEIGHT: 59 IN | OXYGEN SATURATION: 95 % | TEMPERATURE: 98.3 F

## 2020-09-19 LAB
HCT VFR BLD CALC: 35.7 % (ref 37–47)
HEMOGLOBIN: 11.5 GM/DL (ref 12–16)

## 2020-09-19 PROCEDURE — 90715 TDAP VACCINE 7 YRS/> IM: CPT | Performed by: OBSTETRICS & GYNECOLOGY

## 2020-09-19 PROCEDURE — 6360000002 HC RX W HCPCS: Performed by: OBSTETRICS & GYNECOLOGY

## 2020-09-19 PROCEDURE — 36415 COLL VENOUS BLD VENIPUNCTURE: CPT

## 2020-09-19 PROCEDURE — 6370000000 HC RX 637 (ALT 250 FOR IP): Performed by: OBSTETRICS & GYNECOLOGY

## 2020-09-19 PROCEDURE — 85018 HEMOGLOBIN: CPT

## 2020-09-19 PROCEDURE — 85014 HEMATOCRIT: CPT

## 2020-09-19 PROCEDURE — 90471 IMMUNIZATION ADMIN: CPT | Performed by: OBSTETRICS & GYNECOLOGY

## 2020-09-19 RX ORDER — IBUPROFEN 600 MG/1
600 TABLET ORAL EVERY 6 HOURS PRN
Qty: 30 TABLET | Refills: 1 | COMMUNITY
Start: 2020-09-19

## 2020-09-19 RX ORDER — ACETAMINOPHEN 325 MG/1
650 TABLET ORAL EVERY 6 HOURS PRN
Qty: 120 TABLET | Refills: 3 | COMMUNITY
Start: 2020-09-19

## 2020-09-19 RX ADMIN — IBUPROFEN 800 MG: 800 TABLET, FILM COATED ORAL at 11:07

## 2020-09-19 RX ADMIN — ACETAMINOPHEN 650 MG: 325 TABLET ORAL at 09:10

## 2020-09-19 RX ADMIN — DOCUSATE SODIUM 100 MG: 100 CAPSULE, LIQUID FILLED ORAL at 09:10

## 2020-09-19 RX ADMIN — HYDROCORTISONE: 1 CREAM TOPICAL at 09:28

## 2020-09-19 RX ADMIN — BENZOCAINE AND LEVOMENTHOL: 200; 5 SPRAY TOPICAL at 09:28

## 2020-09-19 RX ADMIN — TETANUS TOXOID, REDUCED DIPHTHERIA TOXOID AND ACELLULAR PERTUSSIS VACCINE, ADSORBED 0.5 ML: 5; 2.5; 8; 8; 2.5 SUSPENSION INTRAMUSCULAR at 09:28

## 2020-09-19 ASSESSMENT — PAIN SCALES - GENERAL
PAINLEVEL_OUTOF10: 2
PAINLEVEL_OUTOF10: 3

## 2020-09-19 NOTE — PROGRESS NOTES
Discharge teaching and instructions for diagnosis/procedure of postpartum care completed with patient using teachback method. AVS reviewed. Patient voiced understanding regarding medications, follow up appointments, and care of self at home.  Discharged ambulatory and in a wheelchair to  home with support per family

## 2020-09-19 NOTE — PLAN OF CARE
Problem: Discharge Planning:  Goal: Discharged to appropriate level of care  Description: Discharged to appropriate level of care  9/19/2020 1414 by Rocky Vo RN  Outcome: Ongoing  Note: No discharge needs voiced from patient at this time. Patient expected to be discharged home with family. Problem: Constipation:  Goal: Bowel elimination is within specified parameters  Description: Bowel elimination is within specified parameters  9/19/2020 1414 by Rocky Vo RN  Outcome: Ongoing  Note: Pt passing flatus     Problem: Fluid Volume - Imbalance:  Goal: Absence of postpartum hemorrhage signs and symptoms  Description: Absence of postpartum hemorrhage signs and symptoms  9/19/2020 1414 by Rocky Vo RN  Outcome: Ongoing  Note: Pt having small amount of lochia, no clots     Problem: Infection - Risk of, Puerperal Infection:  Goal: Will show no infection signs and symptoms  Description: Will show no infection signs and symptoms  9/19/2020 1414 by Rocky Vo RN  Outcome: Ongoing  Note: Pt afebrile, no s/s of infection     Problem: Mood - Altered:  Goal: Mood stable  Description: Mood stable  9/19/2020 1414 by Rocky Vo RN  Outcome: Ongoing  Note: Pt calm and expressing needs     Problem: Pain - Acute:  Goal: Pain level will decrease  Description: Pain level will decrease  9/19/2020 1414 by Rocky Vo RN  Outcome: Ongoing  Note: Pain controlled with po meds. Discussed ice for perineal pain or the use of warm blanket/heating pad for uterine cramps. Pt states her pain goal 4/10 has been met. Problem: Falls - Risk of:  Goal: Will remain free from falls  Description: Will remain free from falls  9/19/2020 1414 by Rocky Vo RN  Outcome: Ongoing  Note: No falls this shift. Safety interventions maintained. Care plan reviewed with patient. Patient verbalizes understanding of the plan of care and contribute to goal setting.

## 2020-09-19 NOTE — DISCHARGE SUMMARY
Obstetric Discharge Summary      Pt Name: Saray Freed  MRN: [id number] Alirio #: [de-identified]  YOB: 1995        Admitting Diagnosis  26 y.o.  @ 37+1 wks IUP presented with IOL for suspected fetal growth abnormality with elevated s/d ratio/resistive cord dopplers. Reasons for Admission on 2020  5:28 AM  Vaginal Delivery    Hospital course: 26 y.o.  @ 37+1 wks IUP presented with IOL for suspected fetal growth abnormality with elevated s/d ratio/resistive cord dopplers. She progressed well and delivered via . Her post partum course was uncomplicated. She was discharged home on PPD#2 in good condition. Postpartum/Operative Complications  none    Discharge Diagnosis  Fetal growth abnormality with abnormal cord dopplers  male infant      Discharge Information  Current Discharge Medication List      START taking these medications    Details   ibuprofen (ADVIL;MOTRIN) 600 MG tablet Take 1 tablet by mouth every 6 hours as needed for Pain  Qty: 30 tablet, Refills: 1      acetaminophen (AMINOFEN) 325 MG tablet Take 2 tablets by mouth every 6 hours as needed for Pain  Qty: 120 tablet, Refills: 3         CONTINUE these medications which have NOT CHANGED    Details   Prenatal MV-Min-Fe Fum-FA-DHA (PRENATAL 1 PO) Take by mouth      ALBUTEROL SULFATE IN Inhale into the lungs               Diet: regular  Activity: nothing in the vagina for 6 weeks-no sex, no tampons, no douching, no heavy lifting, limited activity for 2-3 weeks  Discharge to:  home  Follow up in 6 wks.     Kenneth Sanchez  11:25 AM  2020

## 2020-09-19 NOTE — PROGRESS NOTES
Department of Obstetrics and Gynecology  Progress Note      S: doing well. C/o not being able to hold her urine. Denies dysuria. Lochia appropriate. Denies cp, sob, ct. Bottle feeding  male infant. Ready to go home today. O:   Vitals:    20 0910   BP: 123/68   Pulse: 87   Resp: 16   Temp: 97.6 °F (36.4 °C)   SpO2:        Gen: no acute distress   Resp: breathing unlabored   Abd: soft, nondistended, fundus firm below umbilicus    A: 26 y.o.   PPD#2 s/p VAVD, doing well. P:   1. B POS   2. Con't postpartum care-instructed to void q2 hours to allow bladder to rest. Encouraged hydration. Reassurance provided. 3. d/c home today-f/u in 6 wks for [facility].     Mile Ventura  11:24 AM  2020

## 2020-09-21 ENCOUNTER — HOSPITAL ENCOUNTER (EMERGENCY)
Age: 25
Discharge: HOME | End: 2020-09-22
Payer: COMMERCIAL

## 2020-09-21 PROCEDURE — 99283 EMERGENCY DEPT VISIT LOW MDM: CPT

## 2020-09-21 PROCEDURE — 99282 EMERGENCY DEPT VISIT SF MDM: CPT

## 2020-09-22 VITALS
WEIGHT: 183 LBS | OXYGEN SATURATION: 98 % | BODY MASS INDEX: 36.89 KG/M2 | SYSTOLIC BLOOD PRESSURE: 116 MMHG | HEIGHT: 59 IN | RESPIRATION RATE: 17 BRPM | DIASTOLIC BLOOD PRESSURE: 74 MMHG | HEART RATE: 91 BPM | TEMPERATURE: 98.4 F

## 2020-09-22 LAB
BACTERIA: ABNORMAL /HPF
BILIRUBIN URINE: NEGATIVE
BLOOD, URINE: NEGATIVE
CASTS 2: ABNORMAL /LPF
CASTS UA: ABNORMAL /LPF
CHARACTER, URINE: CLEAR
COLOR: YELLOW
CRYSTALS, UA: ABNORMAL
EPITHELIAL CELLS, UA: ABNORMAL /HPF
GLUCOSE URINE: NEGATIVE MG/DL
KETONES, URINE: ABNORMAL
LEUKOCYTE ESTERASE, URINE: ABNORMAL
MISCELLANEOUS 2: ABNORMAL
NITRITE, URINE: NEGATIVE
PH UA: 5.5 (ref 5–9)
PROTEIN UA: NEGATIVE
RBC URINE: ABNORMAL /HPF
RENAL EPITHELIAL, UA: ABNORMAL
SPECIFIC GRAVITY, URINE: 1.03 (ref 1–1.03)
UROBILINOGEN, URINE: 1 EU/DL (ref 0–1)
WBC UA: ABNORMAL /HPF
YEAST: ABNORMAL

## 2020-09-22 PROCEDURE — 81001 URINALYSIS AUTO W/SCOPE: CPT

## 2020-09-22 RX ORDER — LIDOCAINE HYDROCHLORIDE 20 MG/ML
15 SOLUTION OROPHARYNGEAL PRN
Qty: 100 ML | Refills: 0 | Status: SHIPPED | OUTPATIENT
Start: 2020-09-22

## 2020-09-22 NOTE — ED NOTES
Pt went to restroom to urinate for sample. Pt back in bed at this time RR reg. No distress noted.       Debbie Andres RN  09/22/20 0025

## 2020-09-22 NOTE — ED NOTES
Martine Magdaleno NP at bedside to perform rectal exam on pt and to assess pt stiches.       Milena Aguilar RN  09/22/20 0025

## 2020-09-24 ASSESSMENT — ENCOUNTER SYMPTOMS
RECTAL PAIN: 1
SHORTNESS OF BREATH: 0

## 2020-09-24 NOTE — ED PROVIDER NOTES
[facility] Emergency Department    CHIEF COMPLAINT       Chief Complaint   Patient presents with    Hemorrhoids       Nurses Notes reviewed and I agree except as noted in the HPI. HISTORY OF PRESENT ILLNESS    Naman Bee isatu 26 y.o. female who presents to the ED for evaluation of hemorrhoid pain. She is post partum x 4 days. She reports that her rectum is burning and the hemorrhoids are inflamed. She tore and has some sutures. She reports burning in that area as well. HPI was provided by the patient. REVIEW OF SYSTEMS     Review of Systems   Constitutional: Negative for fever. Respiratory: Negative for shortness of breath. Cardiovascular: Negative for chest pain. Gastrointestinal: Positive for rectal pain. Genitourinary: Positive for vaginal pain. Skin: Positive for wound. Neurological: Negative for headaches. PAST MEDICAL HISTORY     Past Medical History:   Diagnosis Date    Acid reflux     Asthma     Cyclic vomiting syndrome        SURGICALHISTORY      has a past surgical history that includes Tonsillectomy; Ankle fracture surgery (Right, 2014); and eye surgery. CURRENT MEDICATIONS       Discharge Medication List as of 9/22/2020  1:43 AM      CONTINUE these medications which have NOT CHANGED    Details   acetaminophen (AMINOFEN) 325 MG tablet Take 2 tablets by mouth every 6 hours as needed for Pain, Disp-120 tablet,R-3OTC      Prenatal MV-Min-Fe Fum-FA-DHA (PRENATAL 1 PO) Take by mouthHistorical Med      ibuprofen (ADVIL;MOTRIN) 600 MG tablet Take 1 tablet by mouth every 6 hours as needed for Pain, Disp-30 tablet,R-1OTC      ALBUTEROL SULFATE IN Inhale into the lungsHistorical Med             ALLERGIES     is allergic to codeine and suprax [cefixime]. FAMILY HISTORY     She indicated that the status of her mother is unknown. She indicated that the status of her paternal grandmother is unknown.  She indicated that the status of her paternal grandfather is unknown.   family history includes Diabetes in her mother; Heart Attack in her paternal grandfather and paternal grandmother. SOCIAL HISTORY       Social History     Socioeconomic History    Marital status: Single     Spouse name: Not on file    Number of children: Not on file    Years of education: Not on file    Highest education level: Not on file   Occupational History    Not on file   Social Needs    Financial resource strain: Not on file    Food insecurity     Worry: Not on file     Inability: Not on file    Transportation needs     Medical: Not on file     Non-medical: Not on file   Tobacco Use    Smoking status: Never Smoker    Smokeless tobacco: Never Used   Substance and Sexual Activity    Alcohol use: Not Currently    Drug use: Never    Sexual activity: Yes     Partners: Male   Lifestyle    Physical activity     Days per week: Not on file     Minutes per session: Not on file    Stress: Not on file   Relationships    Social connections     Talks on phone: Not on file     Gets together: Not on file     Attends Advent service: Not on file     Active member of club or organization: Not on file     Attends meetings of clubs or organizations: Not on file     Relationship status: Not on file    Intimate partner violence     Fear of current or ex partner: Not on file     Emotionally abused: Not on file     Physically abused: Not on file     Forced sexual activity: Not on file   Other Topics Concern    Not on file   Social History Narrative    Not on file       PHYSICAL EXAM     INITIAL VITALS:  height is 4' 11\" (1.499 m) and weight is 183 lb (83 kg). Her oral temperature is 98.4 °F (36.9 °C). Her blood pressure is 116/74 and her pulse is 91. Her respiration is 17 and oxygen saturation is 98%. Physical Exam  Vitals signs and nursing note reviewed. Constitutional:       General: She is not in acute distress. Appearance: She is well-developed. She is not diaphoretic.    HENT: Head: Normocephalic and atraumatic. Eyes:      General:         Right eye: No discharge. Left eye: No discharge. Conjunctiva/sclera: Conjunctivae normal.   Neck:      Musculoskeletal: Normal range of motion. Trachea: No tracheal deviation. Cardiovascular:      Rate and Rhythm: Normal rate and regular rhythm. Heart sounds: Normal heart sounds. No murmur. No gallop. Comments: Normal capillary refill  Pulmonary:      Effort: Pulmonary effort is normal. No respiratory distress. Breath sounds: Normal breath sounds. No stridor. Abdominal:      General: Bowel sounds are normal.      Palpations: Abdomen is soft. Genitourinary:      Musculoskeletal: Normal range of motion. General: No tenderness or deformity. Skin:     General: Skin is warm and dry. Coloration: Skin is not pale. Findings: No erythema or rash. Neurological:      Mental Status: She is alert and oriented to person, place, and time. Cranial Nerves: No cranial nerve deficit. Psychiatric:         Behavior: Behavior normal.         DIFFERENTIAL DIAGNOSIS:   Vaginal tear, hemorrhoid, infection    DIAGNOSTIC RESULTS     EKG: All EKG's are interpreted by the Emergency Department Physician who eithersigns or Co-signs this chart in the absence of a cardiologist.        RADIOLOGY: non-plainfilm images(s) such as CT, Ultrasound and MRI are read by the radiologist.  Plain radiographic images are visualized and preliminarily interpreted by the emergency physician unless otherwise stated below.   No orders to display         LABS:   Labs Reviewed   URINE WITH REFLEXED MICRO - Abnormal; Notable for the following components:       Result Value    Ketones, Urine TRACE (*)     Leukocyte Esterase, Urine TRACE (*)     All other components within normal limits       EMERGENCY DEPARTMENT COURSE:   Vitals:    Vitals:    09/21/20 2337 09/22/20 0044 09/22/20 0155   BP: 121/72 (!) 110/58 116/74   Pulse: 90 85 91 Resp: 17 18 17   Temp: 98.4 °F (36.9 °C)     TempSrc: Oral     SpO2: 98% 98% 98%   Weight: 183 lb (83 kg)     Height: 4' 11\" (1.499 m)            [facility]                  ED Course as of Sep 24 0632   Tue Sep 22, 2020   [place] [address] D/W Dr. Fredrick Andres. She is agreeable to POC to discharge and patient is to follow up in office this week. [KJ]      ED Course User Index  [KJ] Wang Arenas, APRN - CNP       Patient was seen and evaluated in the emergency department for rectal pain and some burning in her vaginal tear. The patient's vaginal wound and rectum are both well appearing. She is given tucks and viscous lidocaine for the discomfort. I discussed the patient with Dr. Fredrick Andres and she is agreeable to poc to dc home with follow up to their office this week. Pt is updated and is agreeable. Medications - No data to display      Patient was seenindependently by myself. The patient's final impression and disposition and plan was determined by myself. CRITICAL CARE:   None    CONSULTS:  None    PROCEDURES:  None    FINAL IMPRESSION     1. Hemorrhoids, unspecified hemorrhoid type    2.  Vaginal tear resulting from childbirth          DISPOSITION/PLAN   DISPOSITION Decision To Discharge 09/22/2020 01:30:08 AM      PATIENT REFERREDTO:  Renay Escamilla MD  [address] [place] [address] [id number] [phone]    Schedule an appointment as soon as possible for a visit in 2 days  For follow up      DISCHARGE MEDICATIONS:  Discharge Medication List as of 9/22/2020  1:43 AM      START taking these medications    Details   tucks (TUCKS) 50 % PADS Apply 1 each topically as needed for Hemorrhoids, Disp-40 each,R-0Print      lidocaine viscous hcl (XYLOCAINE) 2 % SOLN solution Place 15 mLs vaginally as needed for Irritation (apply to vaginal and rectal area), Disp-100 mL,R-0Print             (Please note that portions of this note were completed with a voice recognition program.  Efforts were made to edit the dictations but occasionally words are mis-transcribed.)         YOVANNY Mcrae - YOVANNY Aiken CNP  09/24/20 3107

## 2020-11-12 ENCOUNTER — HOSPITAL ENCOUNTER (EMERGENCY)
Age: 25
Discharge: HOME | End: 2020-11-12
Payer: COMMERCIAL

## 2020-11-12 VITALS
BODY MASS INDEX: 33.43 KG/M2 | TEMPERATURE: 68.3 F | DIASTOLIC BLOOD PRESSURE: 63 MMHG | HEIGHT: 59 IN | SYSTOLIC BLOOD PRESSURE: 115 MMHG | OXYGEN SATURATION: 99 % | WEIGHT: 165.8 LBS | RESPIRATION RATE: 16 BRPM | HEART RATE: 81 BPM

## 2020-11-12 PROCEDURE — 99213 OFFICE O/P EST LOW 20 MIN: CPT

## 2020-11-12 PROCEDURE — U0003 INFECTIOUS AGENT DETECTION BY NUCLEIC ACID (DNA OR RNA); SEVERE ACUTE RESPIRATORY SYNDROME CORONAVIRUS 2 (SARS-COV-2) (CORONAVIRUS DISEASE [COVID-19]), AMPLIFIED PROBE TECHNIQUE, MAKING USE OF HIGH THROUGHPUT TECHNOLOGIES AS DESCRIBED BY CMS-2020-01-R: HCPCS

## 2020-11-12 PROCEDURE — 99203 OFFICE O/P NEW LOW 30 MIN: CPT | Performed by: NURSE PRACTITIONER

## 2020-11-12 ASSESSMENT — ENCOUNTER SYMPTOMS
DIARRHEA: 0
EYE DISCHARGE: 0
FACIAL SWELLING: 0
COUGH: 0
SHORTNESS OF BREATH: 0
SORE THROAT: 0
NAUSEA: 0
PHOTOPHOBIA: 0
EYE REDNESS: 0
CONSTIPATION: 0
BACK PAIN: 0
WHEEZING: 0
ABDOMINAL DISTENTION: 0
CHEST TIGHTNESS: 0
APNEA: 0
ABDOMINAL PAIN: 0
EYE PAIN: 0

## 2020-11-12 NOTE — ED PROVIDER NOTES
[address]       Chief Complaint   Patient presents with    Concern For COVID-19       Nurses Notes reviewed and I agree except as noted in the HPI. HISTORY OF PRESENT ILLNESS   Mau Bocanegra is a 26 y.o. female who presents with need for covid test. Pt works at [employer] and was notified by her employer that she was exposed to covid. Pt has 5 week old infant at home. She is concerned but has      REVIEW OF SYSTEMS     Review of Systems   Constitutional: Negative for activity change, appetite change, diaphoresis, fatigue, fever and unexpected weight change. HENT: Negative for congestion, ear discharge, ear pain, facial swelling, nosebleeds, postnasal drip, sore throat and tinnitus. Eyes: Negative for photophobia, pain, discharge, redness and visual disturbance. Respiratory: Negative for apnea, cough, chest tightness, shortness of breath and wheezing. Cardiovascular: Negative for chest pain and leg swelling. Gastrointestinal: Negative for abdominal distention, abdominal pain, constipation, diarrhea and nausea. Endocrine: Negative for cold intolerance and heat intolerance. Genitourinary: Negative for difficulty urinating, dysuria, flank pain, frequency and urgency. Musculoskeletal: Negative for arthralgias, back pain and myalgias. Skin: Negative for pallor and rash. Allergic/Immunologic: Negative for environmental allergies, food allergies and immunocompromised state. Neurological: Negative for dizziness, weakness, light-headedness, numbness and headaches. Hematological: Negative for adenopathy. Does not bruise/bleed easily. Psychiatric/Behavioral: Negative for agitation, behavioral problems, confusion, decreased concentration and suicidal ideas. The patient is not nervous/anxious. All other systems reviewed and are negative.       PAST MEDICAL HISTORY         Diagnosis Date    Acid reflux     Asthma     Cyclic vomiting syndrome        SURGICAL HISTORY     Patient  has a past surgical history that includes Tonsillectomy; Ankle fracture surgery (Right, 2014); and eye surgery. CURRENT MEDICATIONS       Discharge Medication List as of 11/12/2020  2:59 PM      CONTINUE these medications which have NOT CHANGED    Details   Prenatal MV-Min-Fe Fum-FA-DHA (PRENATAL 1 PO) Take by mouthHistorical Med      tucks (TUCKS) 50 % PADS Apply 1 each topically as needed for Hemorrhoids, Disp-40 each,R-0Print      lidocaine viscous hcl (XYLOCAINE) 2 % SOLN solution Place 15 mLs vaginally as needed for Irritation (apply to vaginal and rectal area), Disp-100 mL,R-0Print      ibuprofen (ADVIL;MOTRIN) 600 MG tablet Take 1 tablet by mouth every 6 hours as needed for Pain, Disp-30 tablet,R-1OTC      acetaminophen (AMINOFEN) 325 MG tablet Take 2 tablets by mouth every 6 hours as needed for Pain, Disp-120 tablet,R-3OTC      ALBUTEROL SULFATE IN Inhale into the lungsHistorical Med             ALLERGIES     Patient is is allergic to codeine and suprax [cefixime]. FAMILY HISTORY     Patient'sfamily history includes Diabetes in her mother; Heart Attack in her paternal grandfather and paternal grandmother. SOCIAL HISTORY     Patient  reports that she has never smoked. She has never used smokeless tobacco. She reports previous alcohol use. She reports that she does not use drugs. PHYSICAL EXAM     ED TRIAGE VITALS  BP: 115/63, Temp: (!) 68.3 °F (20.2 °C), Pulse: 81, Resp: 16, SpO2: 99 %  Physical Exam  Vitals signs and nursing note reviewed. Constitutional:       Appearance: She is well-developed. HENT:      Head: Normocephalic and atraumatic. Nose: Nose normal.      Mouth/Throat:      Pharynx: No oropharyngeal exudate. Eyes:      General:         Right eye: No discharge. Left eye: No discharge. Neck:      Musculoskeletal: Normal range of motion. Thyroid: No thyromegaly.    Cardiovascular:      Rate and Rhythm: Normal rate and regular rhythm. Heart sounds: Normal heart sounds. Pulmonary:      Effort: Pulmonary effort is normal. No respiratory distress. Breath sounds: Normal breath sounds. No wheezing or rales. Chest:      Chest wall: No tenderness. Abdominal:      General: Bowel sounds are normal. There is no distension. Palpations: Abdomen is soft. Tenderness: There is no abdominal tenderness. Musculoskeletal: Normal range of motion. General: No tenderness. Skin:     General: Skin is warm and dry. Coloration: Skin is not pale. Findings: No erythema or rash. Neurological:      Mental Status: She is alert and oriented to person, place, and time. Cranial Nerves: No cranial nerve deficit. Motor: No abnormal muscle tone. Coordination: Coordination normal.      Deep Tendon Reflexes: Reflexes are normal and symmetric. Reflexes normal.   Psychiatric:         Behavior: Behavior normal.         Thought Content: Thought content normal.         Judgment: Judgment normal.         DIAGNOSTIC RESULTS   Labs: No results found for this visit on 11/12/20. IMAGING:  No orders to display     URGENT CARE COURSE:     Vitals:    11/12/20 1443   BP: 115/63   Pulse: 81   Resp: 16   Temp: (!) 68.3 °F (20.2 °C)   TempSrc: Temporal   SpO2: 99%   Weight: 165 lb 12.8 oz (75.2 kg)   Height: 4' 11\" (1.499 m)       Medications - No data to display  PROCEDURES:  None  FINALIMPRESSION    I have reviewed the patient's medical history in detail and updated the computerized patient record. Asymptomatic for covid, reassuring negative physical exam. covid isolation measures given to the patient. Able to verbalize understanding of instructions. 1. Exposure to COVID-19 virus    2.  Worried well        DISPOSITION/PLAN   DISPOSITION Decision To Discharge 11/12/2020 02:59:05 PM    PATIENT REFERRED TO:  [facility]  [address] [facility]  [phone]  In 3 days  As needed, If symptoms worsen    DISCHARGE MEDICATIONS:  Discharge Medication List as of 11/12/2020  2:59 PM        Discharge Medication List as of 11/12/2020  2:59 PM          Roxie Reddy, [facility] [place], [place], APRN - CNP  11/12/20 3178

## 2020-11-12 NOTE — ED TRIAGE NOTES
Patient ambulated to [facility] and [employer], her employer, advised her today she may or may not have been exposed to Covid. Denies any symptoms or discomforts.

## 2020-11-13 ENCOUNTER — CARE COORDINATION (OUTPATIENT)
Dept: CARE COORDINATION | Age: 25
End: 2020-11-13

## 2020-11-15 LAB — SARS-COV-2: NOT DETECTED

## 2021-03-19 ENCOUNTER — HOSPITAL ENCOUNTER (OUTPATIENT)
Age: 26
Setting detail: SPECIMEN
Discharge: HOME | End: 2021-03-19
Payer: COMMERCIAL

## 2021-03-19 LAB
ABSOLUTE EOS #: 0.1 K/UL (ref 0–0.44)
ABSOLUTE IMMATURE GRANULOCYTE: <0.03 K/UL (ref 0–0.3)
ABSOLUTE LYMPH #: 3.77 K/UL (ref 1.1–3.7)
ABSOLUTE MONO #: 0.67 K/UL (ref 0.1–1.2)
ANION GAP SERPL CALCULATED.3IONS-SCNC: 10 MMOL/L (ref 9–17)
BASOPHILS # BLD: 1 % (ref 0–2)
BASOPHILS ABSOLUTE: 0.08 K/UL (ref 0–0.2)
BUN BLDV-MCNC: 12 MG/DL (ref 6–20)
BUN/CREAT BLD: ABNORMAL (ref 9–20)
CALCIUM SERPL-MCNC: 8.9 MG/DL (ref 8.6–10.4)
CHLORIDE BLD-SCNC: 104 MMOL/L (ref 98–107)
CHOLESTEROL/HDL RATIO: 2.5
CHOLESTEROL: 128 MG/DL
CO2: 25 MMOL/L (ref 20–31)
CREAT SERPL-MCNC: 0.46 MG/DL (ref 0.5–0.9)
DIFFERENTIAL TYPE: ABNORMAL
EOSINOPHILS RELATIVE PERCENT: 1 % (ref 1–4)
GFR AFRICAN AMERICAN: >60 ML/MIN
GFR NON-AFRICAN AMERICAN: >60 ML/MIN
GFR SERPL CREATININE-BSD FRML MDRD: ABNORMAL ML/MIN/{1.73_M2}
GFR SERPL CREATININE-BSD FRML MDRD: ABNORMAL ML/MIN/{1.73_M2}
GLUCOSE BLD-MCNC: 97 MG/DL (ref 70–99)
HCT VFR BLD CALC: 42.4 % (ref 36.3–47.1)
HDLC SERPL-MCNC: 52 MG/DL
HEMOGLOBIN: 13 G/DL (ref 11.9–15.1)
IMMATURE GRANULOCYTES: 0 %
LDL CHOLESTEROL: 65 MG/DL (ref 0–130)
LYMPHOCYTES # BLD: 36 % (ref 24–43)
MCH RBC QN AUTO: 29 PG (ref 25.2–33.5)
MCHC RBC AUTO-ENTMCNC: 30.7 G/DL (ref 28.4–34.8)
MCV RBC AUTO: 94.4 FL (ref 82.6–102.9)
MONOCYTES # BLD: 6 % (ref 3–12)
NRBC AUTOMATED: 0 PER 100 WBC
PDW BLD-RTO: 14.1 % (ref 11.8–14.4)
PLATELET # BLD: 366 K/UL (ref 138–453)
PLATELET ESTIMATE: ABNORMAL
PMV BLD AUTO: 10.8 FL (ref 8.1–13.5)
POTASSIUM SERPL-SCNC: 3.8 MMOL/L (ref 3.7–5.3)
RBC # BLD: 4.49 M/UL (ref 3.95–5.11)
RBC # BLD: ABNORMAL 10*6/UL
SEG NEUTROPHILS: 56 % (ref 36–65)
SEGMENTED NEUTROPHILS ABSOLUTE COUNT: 5.97 K/UL (ref 1.5–8.1)
SODIUM BLD-SCNC: 139 MMOL/L (ref 135–144)
TRIGL SERPL-MCNC: 53 MG/DL
TSH SERPL DL<=0.05 MIU/L-ACNC: 2.31 MIU/L (ref 0.3–5)
VITAMIN D 25-HYDROXY: 16.5 NG/ML (ref 30–100)
VLDLC SERPL CALC-MCNC: NORMAL MG/DL (ref 1–30)
WBC # BLD: 10.6 K/UL (ref 3.5–11.3)
WBC # BLD: ABNORMAL 10*3/UL

## 2021-04-19 ENCOUNTER — HOSPITAL ENCOUNTER (OUTPATIENT)
Dept: WOMENS IMAGING | Age: 26
Discharge: HOME | End: 2021-04-19
Payer: COMMERCIAL

## 2021-04-19 ENCOUNTER — HOSPITAL ENCOUNTER (OUTPATIENT)
Dept: WOMENS IMAGING | Age: 26
End: 2021-04-19
Payer: COMMERCIAL

## 2021-04-19 DIAGNOSIS — R92.2 BREAST DENSITY: ICD-10-CM

## 2021-04-19 DIAGNOSIS — N63.20 UNSPECIFIED LUMP IN THE LEFT BREAST, UNSPECIFIED QUADRANT: ICD-10-CM

## 2021-04-19 PROCEDURE — 76642 ULTRASOUND BREAST LIMITED: CPT

## 2021-08-19 ENCOUNTER — HOSPITAL ENCOUNTER (EMERGENCY)
Age: 26
Discharge: HOME | End: 2021-08-19
Attending: EMERGENCY MEDICINE

## 2021-08-19 VITALS
HEART RATE: 75 BPM | OXYGEN SATURATION: 98 % | TEMPERATURE: 98 F | RESPIRATION RATE: 16 BRPM | DIASTOLIC BLOOD PRESSURE: 61 MMHG | SYSTOLIC BLOOD PRESSURE: 103 MMHG

## 2021-08-19 DIAGNOSIS — F12.10 MARIJUANA ABUSE: ICD-10-CM

## 2021-08-19 DIAGNOSIS — R11.15 CYCLIC VOMITING SYNDROME: Primary | ICD-10-CM

## 2021-08-19 LAB
ALBUMIN SERPL-MCNC: 4.9 G/DL (ref 3.5–5.1)
ALP BLD-CCNC: 105 U/L (ref 38–126)
ALT SERPL-CCNC: 6 U/L (ref 11–66)
AMPHETAMINE+METHAMPHETAMINE URINE SCREEN: NEGATIVE
ANION GAP SERPL CALCULATED.3IONS-SCNC: 13 MEQ/L (ref 8–16)
AST SERPL-CCNC: 17 U/L (ref 5–40)
BACTERIA: ABNORMAL /HPF
BARBITURATE QUANTITATIVE URINE: NEGATIVE
BASOPHILS # BLD: 0.2 %
BASOPHILS ABSOLUTE: 0 THOU/MM3 (ref 0–0.1)
BENZODIAZEPINE QUANTITATIVE URINE: NEGATIVE
BILIRUB SERPL-MCNC: 0.4 MG/DL (ref 0.3–1.2)
BILIRUBIN DIRECT: < 0.2 MG/DL (ref 0–0.3)
BILIRUBIN URINE: NEGATIVE
BLOOD, URINE: ABNORMAL
BUN BLDV-MCNC: 7 MG/DL (ref 7–22)
CALCIUM SERPL-MCNC: 9.3 MG/DL (ref 8.5–10.5)
CANNABINOID QUANTITATIVE URINE: POSITIVE
CASTS 2: ABNORMAL /LPF
CASTS UA: ABNORMAL /LPF
CHARACTER, URINE: CLEAR
CHLORIDE BLD-SCNC: 104 MEQ/L (ref 98–111)
CO2: 24 MEQ/L (ref 23–33)
COCAINE METABOLITE QUANTITATIVE URINE: NEGATIVE
COLOR: YELLOW
CREAT SERPL-MCNC: 0.5 MG/DL (ref 0.4–1.2)
CRYSTALS, UA: ABNORMAL
EOSINOPHIL # BLD: 0.1 %
EOSINOPHILS ABSOLUTE: 0 THOU/MM3 (ref 0–0.4)
EPITHELIAL CELLS, UA: ABNORMAL /HPF
ERYTHROCYTE [DISTWIDTH] IN BLOOD BY AUTOMATED COUNT: 14.1 % (ref 11.5–14.5)
ERYTHROCYTE [DISTWIDTH] IN BLOOD BY AUTOMATED COUNT: 48.7 FL (ref 35–45)
ETHYL ALCOHOL, SERUM: < 0.01 %
GFR SERPL CREATININE-BSD FRML MDRD: > 90 ML/MIN/1.73M2
GLUCOSE BLD-MCNC: 122 MG/DL (ref 70–108)
GLUCOSE URINE: NEGATIVE MG/DL
HCT VFR BLD CALC: 45.1 % (ref 37–47)
HEMOGLOBIN: 14.5 GM/DL (ref 12–16)
IMMATURE GRANS (ABS): 0.06 THOU/MM3 (ref 0–0.07)
IMMATURE GRANULOCYTES: 0.4 %
KETONES, URINE: 40
LEUKOCYTE ESTERASE, URINE: NEGATIVE
LIPASE: 21.7 U/L (ref 5.6–51.3)
LYMPHOCYTES # BLD: 13.4 %
LYMPHOCYTES ABSOLUTE: 1.8 THOU/MM3 (ref 1–4.8)
MAGNESIUM: 1.8 MG/DL (ref 1.6–2.4)
MCH RBC QN AUTO: 30.1 PG (ref 26–33)
MCHC RBC AUTO-ENTMCNC: 32.2 GM/DL (ref 32.2–35.5)
MCV RBC AUTO: 93.8 FL (ref 81–99)
MISCELLANEOUS 2: ABNORMAL
MONOCYTES # BLD: 5.9 %
MONOCYTES ABSOLUTE: 0.8 THOU/MM3 (ref 0.4–1.3)
NITRITE, URINE: NEGATIVE
NUCLEATED RED BLOOD CELLS: 0 /100 WBC
OPIATES, URINE: NEGATIVE
OSMOLALITY CALCULATION: 280.5 MOSMOL/KG (ref 275–300)
OXYCODONE: NEGATIVE
PH UA: 8.5 (ref 5–9)
PHENCYCLIDINE QUANTITATIVE URINE: NEGATIVE
PLATELET # BLD: 383 THOU/MM3 (ref 130–400)
PMV BLD AUTO: 10.1 FL (ref 9.4–12.4)
POTASSIUM SERPL-SCNC: 3.1 MEQ/L (ref 3.5–5.2)
PREGNANCY, SERUM: NEGATIVE
PROTEIN UA: NEGATIVE
RBC # BLD: 4.81 MILL/MM3 (ref 4.2–5.4)
RBC URINE: ABNORMAL /HPF
RENAL EPITHELIAL, UA: ABNORMAL
SEG NEUTROPHILS: 80 %
SEGMENTED NEUTROPHILS ABSOLUTE COUNT: 11 THOU/MM3 (ref 1.8–7.7)
SODIUM BLD-SCNC: 141 MEQ/L (ref 135–145)
SPECIFIC GRAVITY, URINE: > 1.03 (ref 1–1.03)
TOTAL PROTEIN: 7.8 G/DL (ref 6.1–8)
UROBILINOGEN, URINE: 1 EU/DL (ref 0–1)
WBC # BLD: 13.7 THOU/MM3 (ref 4.8–10.8)
WBC UA: ABNORMAL /HPF
YEAST: ABNORMAL

## 2021-08-19 PROCEDURE — 36415 COLL VENOUS BLD VENIPUNCTURE: CPT

## 2021-08-19 PROCEDURE — 80053 COMPREHEN METABOLIC PANEL: CPT

## 2021-08-19 PROCEDURE — 81001 URINALYSIS AUTO W/SCOPE: CPT

## 2021-08-19 PROCEDURE — 85025 COMPLETE CBC W/AUTO DIFF WBC: CPT

## 2021-08-19 PROCEDURE — 84703 CHORIONIC GONADOTROPIN ASSAY: CPT

## 2021-08-19 PROCEDURE — 82248 BILIRUBIN DIRECT: CPT

## 2021-08-19 PROCEDURE — 96372 THER/PROPH/DIAG INJ SC/IM: CPT

## 2021-08-19 PROCEDURE — 83735 ASSAY OF MAGNESIUM: CPT

## 2021-08-19 PROCEDURE — 6360000002 HC RX W HCPCS: Performed by: EMERGENCY MEDICINE

## 2021-08-19 PROCEDURE — 82077 ASSAY SPEC XCP UR&BREATH IA: CPT

## 2021-08-19 PROCEDURE — 99283 EMERGENCY DEPT VISIT LOW MDM: CPT

## 2021-08-19 PROCEDURE — 80307 DRUG TEST PRSMV CHEM ANLYZR: CPT

## 2021-08-19 PROCEDURE — 6370000000 HC RX 637 (ALT 250 FOR IP): Performed by: EMERGENCY MEDICINE

## 2021-08-19 PROCEDURE — 83690 ASSAY OF LIPASE: CPT

## 2021-08-19 RX ORDER — ONDANSETRON 4 MG/1
4 TABLET, ORALLY DISINTEGRATING ORAL ONCE
Status: COMPLETED | OUTPATIENT
Start: 2021-08-19 | End: 2021-08-19

## 2021-08-19 RX ORDER — HYDROXYZINE PAMOATE 25 MG/1
25 CAPSULE ORAL 3 TIMES DAILY PRN
Qty: 30 CAPSULE | Refills: 0 | Status: SHIPPED | OUTPATIENT
Start: 2021-08-19 | End: 2021-08-29

## 2021-08-19 RX ORDER — ONDANSETRON 4 MG/1
4 TABLET, ORALLY DISINTEGRATING ORAL 3 TIMES DAILY PRN
Qty: 21 TABLET | Refills: 0 | Status: SHIPPED | OUTPATIENT
Start: 2021-08-19 | End: 2022-03-25 | Stop reason: ALTCHOICE

## 2021-08-19 RX ORDER — PANTOPRAZOLE SODIUM 40 MG/1
40 TABLET, DELAYED RELEASE ORAL ONCE
Status: DISCONTINUED | OUTPATIENT
Start: 2021-08-19 | End: 2021-08-19

## 2021-08-19 RX ORDER — PROMETHAZINE HYDROCHLORIDE 25 MG/ML
6.25 INJECTION, SOLUTION INTRAMUSCULAR; INTRAVENOUS ONCE
Status: COMPLETED | OUTPATIENT
Start: 2021-08-19 | End: 2021-08-19

## 2021-08-19 RX ORDER — PANTOPRAZOLE SODIUM 40 MG/10ML
40 INJECTION, POWDER, LYOPHILIZED, FOR SOLUTION INTRAVENOUS DAILY
Status: DISCONTINUED | OUTPATIENT
Start: 2021-08-19 | End: 2021-08-19 | Stop reason: HOSPADM

## 2021-08-19 RX ORDER — SODIUM CHLORIDE 9 MG/ML
10 INJECTION INTRAVENOUS DAILY
Status: DISCONTINUED | OUTPATIENT
Start: 2021-08-19 | End: 2021-08-19 | Stop reason: HOSPADM

## 2021-08-19 RX ADMIN — ONDANSETRON 4 MG: 4 TABLET, ORALLY DISINTEGRATING ORAL at 01:49

## 2021-08-19 RX ADMIN — PROMETHAZINE HYDROCHLORIDE 6.25 MG: 25 INJECTION INTRAMUSCULAR; INTRAVENOUS at 03:33

## 2021-08-19 ASSESSMENT — ENCOUNTER SYMPTOMS
EYE PAIN: 0
SORE THROAT: 0
CONSTIPATION: 0
ABDOMINAL PAIN: 1
PHOTOPHOBIA: 0
CHOKING: 0
CHEST TIGHTNESS: 0
WHEEZING: 0
VOMITING: 1
VOICE CHANGE: 0
SHORTNESS OF BREATH: 0
NAUSEA: 1
RHINORRHEA: 0
BACK PAIN: 0
COUGH: 0
ABDOMINAL DISTENTION: 0
EYE REDNESS: 0
EYE DISCHARGE: 0
EYE ITCHING: 0
SINUS PRESSURE: 0
BLOOD IN STOOL: 0
TROUBLE SWALLOWING: 0
DIARRHEA: 0

## 2021-08-19 ASSESSMENT — PAIN DESCRIPTION - LOCATION: LOCATION: ABDOMEN

## 2021-08-19 NOTE — ED PROVIDER NOTES
[facility]  eMERGENCY dEPARTMENT eNCOUnter          CHIEF COMPLAINT       Chief Complaint   Patient presents with    Abdominal Pain    Hematemesis       Nurses Notes reviewed and I agree except as noted in the HPI. HISTORY OF PRESENT ILLNESS    Karson Rivera is a 23 y.o. female who presents nausea and vomiting. Apparently the patient has history of GERD. She also has a history of cyclic vomiting syndrome. She states she has been dealing with this for a long period of time. She states that she started vomiting earlier today. She went to [facility] they did not give her any medications they just checked her out and then sent her home. Patient came here today after [facility] for [facility] evaluation and treatment. Patient is currently telling me that she is having multiple bouts of vomiting. She did manage to eat some chicken McNuggets earlier in the day however she is vomited those all up. Patient is otherwise resting comfortably on the cot no apparent distress no other physical complaints at this time. REVIEW OF SYSTEMS     Review of Systems   Constitutional: Negative for activity change, appetite change, diaphoresis, fatigue and unexpected weight change. HENT: Negative for congestion, ear discharge, ear pain, hearing loss, rhinorrhea, sinus pressure, sore throat, trouble swallowing and voice change. Eyes: Negative for photophobia, pain, discharge, redness and itching. Respiratory: Negative for cough, choking, chest tightness, shortness of breath and wheezing. Cardiovascular: Negative for chest pain, palpitations and leg swelling. Gastrointestinal: Positive for abdominal pain, nausea and vomiting. Negative for abdominal distention, blood in stool, constipation and diarrhea. Endocrine: Negative for polydipsia, polyphagia and polyuria.    Genitourinary: Negative for decreased urine volume, difficulty urinating, dysuria, enuresis, frequency, hematuria and urgency. Musculoskeletal: Negative for arthralgias, back pain, gait problem, myalgias, neck pain and neck stiffness. Skin: Negative for pallor and rash. Allergic/Immunologic: Negative for immunocompromised state. Neurological: Negative for dizziness, tremors, seizures, syncope, facial asymmetry, weakness, light-headedness, numbness and headaches. Hematological: Negative for adenopathy. Does not bruise/bleed easily. Psychiatric/Behavioral: Negative for agitation, hallucinations and suicidal ideas. The patient is not nervous/anxious. PAST MEDICAL HISTORY    has a past medical history of Acid reflux, Asthma, and Cyclic vomiting syndrome. SURGICAL HISTORY      has a past surgical history that includes Tonsillectomy; Ankle fracture surgery (Right, 2014); and eye surgery. CURRENT MEDICATIONS       Previous Medications    ACETAMINOPHEN (AMINOFEN) 325 MG TABLET    Take 2 tablets by mouth every 6 hours as needed for Pain    ALBUTEROL SULFATE IN    Inhale into the lungs    IBUPROFEN (ADVIL;MOTRIN) 600 MG TABLET    Take 1 tablet by mouth every 6 hours as needed for Pain    LIDOCAINE VISCOUS HCL (XYLOCAINE) 2 % SOLN SOLUTION    Place 15 mLs vaginally as needed for Irritation (apply to vaginal and rectal area)    PRENATAL MV-MIN-FE FUM-FA-DHA (PRENATAL 1 PO)    Take by mouth    TUCKS (TUCKS) 50 % PADS    Apply 1 each topically as needed for Hemorrhoids       ALLERGIES     is allergic to codeine and suprax [cefixime]. FAMILY HISTORY     She indicated that the status of her mother is unknown. She indicated that the status of her paternal grandmother is unknown. She indicated that the status of her paternal grandfather is unknown. She indicated that the status of her other is unknown.   family history includes Breast Cancer in an other family member; Diabetes in her mother; Heart Attack in her paternal grandfather and paternal grandmother. SOCIAL HISTORY      reports that she has never smoked.  She has never used smokeless tobacco. She reports previous alcohol use. She reports that she does not use drugs. PHYSICAL EXAM     INITIAL VITALS:  temperature is 98 °F (36.7 °C). Her blood pressure is 108/59 (abnormal) and her pulse is 81. Her respiration is 16 and oxygen saturation is 97%. Physical Exam  Vitals and nursing note reviewed. Constitutional:       General: She is not in acute distress. Appearance: She is well-developed. She is not diaphoretic. HENT:      Head: Normocephalic and atraumatic. Right Ear: External ear normal.      Left Ear: External ear normal.      Nose: Nose normal.      Mouth/Throat:      Pharynx: No oropharyngeal exudate. Eyes:      General: No scleral icterus. Right eye: No discharge. Left eye: No discharge. Conjunctiva/sclera: Conjunctivae normal.      Pupils: Pupils are equal, round, and reactive to light. Neck:      Thyroid: No thyromegaly. Vascular: No JVD. Trachea: No tracheal deviation. Cardiovascular:      Rate and Rhythm: Normal rate and regular rhythm. Heart sounds: Normal heart sounds, S1 normal and S2 normal. No murmur heard. No friction rub. No gallop. Pulmonary:      Effort: Pulmonary effort is normal.      Breath sounds: Normal breath sounds. No stridor. No wheezing, rhonchi or rales. Chest:      Chest wall: No tenderness. Abdominal:      General: Bowel sounds are normal. There is no distension. Palpations: Abdomen is soft. There is no mass. Tenderness: There is no abdominal tenderness. There is no guarding or rebound. Hernia: No hernia is present. Musculoskeletal:         General: No tenderness. Normal range of motion. Cervical back: Normal range of motion and neck supple. Lymphadenopathy:      Cervical: No cervical adenopathy. Skin:     General: Skin is warm and dry. Capillary Refill: Capillary refill takes less than 2 seconds.       Findings: No bruising, ecchymosis, lesion or rash. Neurological:      General: No focal deficit present. Mental Status: She is alert and oriented to person, place, and time. Cranial Nerves: No cranial nerve deficit. Coordination: Coordination normal.      Deep Tendon Reflexes: Reflexes are normal and symmetric. Psychiatric:         Speech: Speech normal.         Behavior: Behavior normal.         Thought Content:  Thought content normal.         Judgment: Judgment normal.           DIFFERENTIAL DIAGNOSIS:   GERD gastritis cyclic vomiting syndrome substance abuse    DIAGNOSTIC RESULTS     EKG: All EKG's are interpreted by the Emergency Department Physician who either signs or Co-signs this chart in the absence of a cardiologist.  None    RADIOLOGY: non-plain film images(s) such as CT, Ultrasound and MRI are read by the radiologist.  None    LABS:   Labs Reviewed   CBC WITH AUTO DIFFERENTIAL - Abnormal; Notable for the following components:       Result Value    WBC 13.7 (*)     RDW-SD 48.7 (*)     Segs Absolute 11.0 (*)     All other components within normal limits   BASIC METABOLIC PANEL - Abnormal; Notable for the following components:    Potassium 3.1 (*)     Glucose 122 (*)     All other components within normal limits   HEPATIC FUNCTION PANEL - Abnormal; Notable for the following components:    ALT 6 (*)     All other components within normal limits   URINE WITH REFLEXED MICRO - Abnormal; Notable for the following components:    Ketones, Urine 40 (*)     Specific Gravity, Urine > 1.030 (*)     Blood, Urine MODERATE (*)     All other components within normal limits   LIPASE   MAGNESIUM   HCG, SERUM, QUALITATIVE   ETHANOL   URINE DRUG SCREEN   ANION GAP   OSMOLALITY   GLOMERULAR FILTRATION RATE, ESTIMATED       EMERGENCY DEPARTMENT COURSE:   Vitals:    Vitals:    08/19/21 0128 08/19/21 0227   BP: 125/85 (!) 108/59   Pulse: 83 81   Resp: 16 16   Temp:  98 °F (36.7 °C)   SpO2: 96% 97%     Patient was assessed at bedside appropriate labs were ordered. Patient was given Zofran Protonix she refused a GI cocktail and Phenergan. She was also given fluids. I reviewed all the labs and went to bed to bedside. Patient is now resting comfortably. Patient is positive for marijuana. She does have a history of cyclic vomiting syndrome. I discussed case with her. She will be given antinausea medication for at home she is instructed to discontinue use of marijuana. She is given Vistaril at home for her vomiting and her anxiety. Patient is instructed to follow-up with a primary care physician to do so within the next 1 to 2 days. Patient understood and agreed with the plan. Patient is subsequently discharged home in stable condition. Patient has what appears to be cyclic vomiting syndrome. Patient has been given Vistaril for this she is instructed to use it as prescribed. She has been given antinausea medication she can use for at home to help with the nausea and vomiting. She is instructed to discontinue use of marijuana. She is instructed return to the nearest emergency room immediately for any new or worsening complaints. CRITICAL CARE:   None    CONSULTS:  None    PROCEDURES:  None    FINAL IMPRESSION      1. Cyclic vomiting syndrome    2.  Marijuana abuse          DISPOSITION/PLAN   Discharge    PATIENT REFERRED TO:  YOVANNY Harrell - [facility]  [address] [facility]  [phone]    Call in 1 day        DISCHARGE MEDICATIONS:  New Prescriptions    HYDROXYZINE (VISTARIL) 25 MG CAPSULE    Take 1 capsule by mouth 3 times daily as needed for Itching    ONDANSETRON (ZOFRAN-ODT) 4 MG DISINTEGRATING TABLET    Take 1 tablet by mouth 3 times daily as needed for Nausea or Vomiting       (Please note that portions of this note were completed with a voice recognition program.  Efforts were made to edit the dictations but occasionally words are mis-transcribed.)    Adan Lambert, [address] [place] [address], DO  08/19/21 9616

## 2021-08-19 NOTE — ED NOTES
Pt in bed in head down spitting saliva into emesis basin. Pt requesting medication be given IV.  Will notify Dr. Pfeiffer Head, RN  08/19/21 2086

## 2021-12-22 ENCOUNTER — CLINICAL DOCUMENTATION (OUTPATIENT)
Dept: WOMENS IMAGING | Age: 26
End: 2021-12-22

## 2021-12-22 NOTE — PROGRESS NOTES
12/22/21  Multiple attempts have been made to remind the patient of her 6 month follow up breast imaging. Her doctor has also been notified. She will be notified to return to her yearly mammogram once she turns 37.

## 2022-03-20 ENCOUNTER — HOSPITAL ENCOUNTER (EMERGENCY)
Age: 27
Discharge: HOME | End: 2022-03-21
Attending: EMERGENCY MEDICINE
Payer: MEDICAID

## 2022-03-20 VITALS
HEART RATE: 88 BPM | DIASTOLIC BLOOD PRESSURE: 69 MMHG | SYSTOLIC BLOOD PRESSURE: 117 MMHG | RESPIRATION RATE: 16 BRPM | TEMPERATURE: 99.5 F | OXYGEN SATURATION: 100 %

## 2022-03-20 DIAGNOSIS — J06.9 VIRAL UPPER RESPIRATORY TRACT INFECTION: Primary | ICD-10-CM

## 2022-03-20 PROCEDURE — 99282 EMERGENCY DEPT VISIT SF MDM: CPT

## 2022-03-20 PROCEDURE — 87804 INFLUENZA ASSAY W/OPTIC: CPT

## 2022-03-20 PROCEDURE — 87635 SARS-COV-2 COVID-19 AMP PRB: CPT

## 2022-03-20 ASSESSMENT — PAIN DESCRIPTION - PAIN TYPE: TYPE: ACUTE PAIN

## 2022-03-20 ASSESSMENT — PAIN SCALES - GENERAL: PAINLEVEL_OUTOF10: 3

## 2022-03-20 ASSESSMENT — PAIN DESCRIPTION - LOCATION: LOCATION: GENERALIZED

## 2022-03-20 ASSESSMENT — PAIN - FUNCTIONAL ASSESSMENT: PAIN_FUNCTIONAL_ASSESSMENT: 0-10

## 2022-03-21 LAB
FLU A ANTIGEN: NEGATIVE
FLU B ANTIGEN: NEGATIVE
SARS-COV-2, NAAT: NOT  DETECTED

## 2022-03-21 PROCEDURE — 6370000000 HC RX 637 (ALT 250 FOR IP): Performed by: EMERGENCY MEDICINE

## 2022-03-21 RX ORDER — BENZONATATE 100 MG/1
100-200 CAPSULE ORAL 3 TIMES DAILY PRN
Qty: 42 CAPSULE | Refills: 0 | Status: SHIPPED | OUTPATIENT
Start: 2022-03-21 | End: 2022-03-28

## 2022-03-21 RX ORDER — IBUPROFEN 600 MG/1
600 TABLET ORAL EVERY 6 HOURS PRN
Qty: 120 TABLET | Refills: 0 | Status: ON HOLD | OUTPATIENT
Start: 2022-03-21 | End: 2022-05-06 | Stop reason: SDUPTHER

## 2022-03-21 RX ORDER — PSEUDOEPHEDRINE HYDROCHLORIDE 30 MG/1
30 TABLET ORAL EVERY 4 HOURS PRN
Qty: 42 TABLET | Refills: 0 | Status: SHIPPED | OUTPATIENT
Start: 2022-03-21 | End: 2022-03-28

## 2022-03-21 RX ORDER — IBUPROFEN 200 MG
600 TABLET ORAL ONCE
Status: COMPLETED | OUTPATIENT
Start: 2022-03-21 | End: 2022-03-21

## 2022-03-21 RX ADMIN — IBUPROFEN 600 MG: 200 TABLET, FILM COATED ORAL at 00:39

## 2022-03-21 ASSESSMENT — ENCOUNTER SYMPTOMS
SHORTNESS OF BREATH: 0
VOMITING: 0
EYE REDNESS: 0
TROUBLE SWALLOWING: 0
BACK PAIN: 0
NAUSEA: 0
COUGH: 1
ABDOMINAL PAIN: 0

## 2022-03-21 NOTE — ED PROVIDER NOTES
[facility] [address] [address] EMERGENCY DEPT    EMERGENCY MEDICINE     Pt Name: Sue Villafana  MRN: [id number]  [place] 1995  Date of evaluation: 3/20/2022  Provider: Maxine Varela MD,     [address] [place] [address]       Chief Complaint   Patient presents with    Generalized Body Aches       HISTORY OF PRESENT ILLNESS    Sue Villafana is a pleasant 23 y.o. female who presents to the emergency department from home evaluation of body aches, congestion, cough. Patient states that she has been sick for the past 3 days and everyone at work is also sick. She has not taken any over-the-counter medication for relief. She came in [facility] to be evaluated at the request of her fiancé. They have a  and she has been trying to avoid contact with the infant to prevent spread of infection. Patient states that she has had body aches, congestion, and intermittent cough. The cough is nonproductive. She is unsure if she has had any fever at home. She denies any chest pain, difficulty breathing, nausea, vomiting, diarrhea. Triage notes and Nursing notes were reviewed by myself. Any discrepancies are addressed above.     PAST MEDICAL HISTORY     Past Medical History:   Diagnosis Date    Acid reflux     Asthma     Cyclic vomiting syndrome        SURGICAL HISTORY       Past Surgical History:   Procedure Laterality Date    ANKLE FRACTURE SURGERY Right 2014    EYE SURGERY      TONSILLECTOMY         CURRENT MEDICATIONS       Previous Medications    ACETAMINOPHEN (AMINOFEN) 325 MG TABLET    Take 2 tablets by mouth every 6 hours as needed for Pain    ALBUTEROL SULFATE IN    Inhale into the lungs    IBUPROFEN (ADVIL;MOTRIN) 600 MG TABLET    Take 1 tablet by mouth every 6 hours as needed for Pain    LIDOCAINE VISCOUS HCL (XYLOCAINE) 2 % SOLN SOLUTION    Place 15 mLs vaginally as needed for Irritation (apply to vaginal and rectal area)    ONDANSETRON (ZOFRAN-ODT) 4 MG DISINTEGRATING TABLET    Take 1 tablet by mouth 3 times daily as needed for Nausea or Vomiting    PRENATAL MV-MIN-FE FUM-FA-DHA (PRENATAL 1 PO)    Take by mouth    TUCKS (TUCKS) 50 % PADS    Apply 1 each topically as needed for Hemorrhoids       ALLERGIES     Codeine and Suprax [cefixime]    FAMILY HISTORY       Family History   Problem Relation Age of Onset    Diabetes Mother     Heart Attack Paternal Grandmother     Heart Attack Paternal Grandfather     Breast Cancer Other         maternal aunt        SOCIAL HISTORY       Social History     Socioeconomic History    Marital status: Single     Spouse name: Not on file    Number of children: Not on file    Years of education: Not on file    Highest education level: Not on file   Occupational History    Not on file   Tobacco Use    Smoking status: Never Smoker    Smokeless tobacco: Never Used   Vaping Use    Vaping Use: Never used   Substance and Sexual Activity    Alcohol use: Not Currently    Drug use: Never    Sexual activity: Yes     Partners: Male   Other Topics Concern    Not on file   Social History Narrative    Not on file     Social Determinants of Health     Financial Resource Strain:     Difficulty of Paying Living Expenses: Not on file   Food Insecurity:     Worried About Running Out of Food in the Last Year: Not on file    Milad of Food in the Last Year: Not on file   Transportation Needs:     Lack of Transportation (Medical): Not on file    Lack of Transportation (Non-Medical):  Not on file   Physical Activity:     Days of Exercise per Week: Not on file    Minutes of Exercise per Session: Not on file   Stress:     Feeling of Stress : Not on file   Social Connections:     Frequency of Communication with Friends and Family: Not on file    Frequency of Social Gatherings with Friends and Family: Not on file    Attends [employer] Services: Not on file    Active Member of Clubs or Organizations: Not on file    Attends Club or Organization Meetings: Not on file    Marital Status: Not on file Intimate Partner Violence:     Fear of Current or Ex-Partner: Not on file    Emotionally Abused: Not on file    Physically Abused: Not on file    Sexually Abused: Not on file   Housing Stability:     Unable to Pay for Housing in the Last Year: Not on file    Number of [place] in the Last Year: Not on file    Unstable Housing in the Last Year: Not on file       REVIEW OF SYSTEMS     Review of Systems   Constitutional: Positive for fatigue. Negative for fever. HENT: Positive for congestion. Negative for trouble swallowing. Eyes: Negative for redness. Respiratory: Positive for cough. Negative for shortness of breath. Cardiovascular: Negative for chest pain. Gastrointestinal: Negative for abdominal pain, nausea and vomiting. Genitourinary: Negative for dysuria. Musculoskeletal: Positive for myalgias. Negative for back pain. Skin: Negative for rash. Allergic/Immunologic: Negative for immunocompromised state. Neurological: Negative for light-headedness. Hematological: Does not bruise/bleed easily. Except as noted above the remainder of the review of systems was reviewed and is. PHYSICAL EXAM    (up to 7 for level 4, 8 or more for level 5)     ED Triage Vitals [03/20/22 2350]   BP Temp Temp Source Pulse Resp SpO2 Height Weight   117/69 99.5 °F (37.5 °C) Oral 88 16 100 % -- --       Physical Exam  Vitals and nursing note reviewed. Exam conducted with a chaperone present. Constitutional:       General: She is not in acute distress. Appearance: She is normal weight. She is ill-appearing. HENT:      Head: Normocephalic and atraumatic. Right Ear: Tympanic membrane, ear canal and external ear normal.      Left Ear: Tympanic membrane, ear canal and external ear normal.      Nose: Congestion present. Mouth/Throat:      Mouth: Mucous membranes are moist.      Pharynx: Oropharynx is clear. No oropharyngeal exudate or posterior oropharyngeal erythema.    Eyes: General:         Right eye: No discharge. Left eye: No discharge. Extraocular Movements: Extraocular movements intact. Pupils: Pupils are equal, round, and reactive to light. Cardiovascular:      Rate and Rhythm: Normal rate and regular rhythm. Pulses: Normal pulses. Heart sounds: Normal heart sounds. No murmur heard. No friction rub. Pulmonary:      Effort: Pulmonary effort is normal. No respiratory distress. Breath sounds: Normal breath sounds. No wheezing or rhonchi. Abdominal:      General: Abdomen is flat. There is no distension. Palpations: Abdomen is soft. Tenderness: There is no abdominal tenderness. There is no guarding or rebound. Musculoskeletal:         General: Normal range of motion. Skin:     General: Skin is warm and dry. Capillary Refill: Capillary refill takes less than 2 seconds. Neurological:      General: No focal deficit present. Mental Status: She is alert and oriented to person, place, and time. DIAGNOSTIC RESULTS     EKG:(none if blank)  All EKG's are interpreted by [facility] who either signs or Co-signs this chart in the absence of a cardiologist.        RADIOLOGY: (none if blank)   Interpretation per the Radiologistbelow, if available at the time of this note:    No orders to display       LABS:  Labs Reviewed   COVID-19, RAPID   RAPID INFLUENZA A/B ANTIGENS       All other labs were within normal range or not returned as of this dictation. Please note, any cultures that may have been sent were not resulted at the time of this patient visit. EMERGENCY DEPARTMENT COURSE andMedical Decision Making:     MDM  Number of Diagnoses or Management Options  Viral upper respiratory tract infection  Diagnosis management comments: Shyann Santana 12year-old female presents emergency room for congestion, cough, body aches. She is nontoxic-appearing with normal vital signs.   She does have a low-grade temperature

## 2022-03-25 ENCOUNTER — HOSPITAL ENCOUNTER (EMERGENCY)
Age: 27
Discharge: HOME | End: 2022-03-25
Attending: EMERGENCY MEDICINE
Payer: MEDICAID

## 2022-03-25 ENCOUNTER — APPOINTMENT (OUTPATIENT)
Dept: CT IMAGING | Age: 27
End: 2022-03-25
Payer: MEDICAID

## 2022-03-25 VITALS
RESPIRATION RATE: 16 BRPM | DIASTOLIC BLOOD PRESSURE: 74 MMHG | OXYGEN SATURATION: 95 % | HEIGHT: 59 IN | SYSTOLIC BLOOD PRESSURE: 113 MMHG | TEMPERATURE: 98.2 F | BODY MASS INDEX: 33.47 KG/M2 | HEART RATE: 81 BPM | WEIGHT: 166 LBS

## 2022-03-25 DIAGNOSIS — R10.13 ABDOMINAL PAIN, EPIGASTRIC: Primary | ICD-10-CM

## 2022-03-25 LAB
ALBUMIN SERPL-MCNC: 4.3 G/DL (ref 3.5–5.1)
ALP BLD-CCNC: 90 U/L (ref 38–126)
ALT SERPL-CCNC: 6 U/L (ref 11–66)
ANION GAP SERPL CALCULATED.3IONS-SCNC: 12 MEQ/L (ref 8–16)
AST SERPL-CCNC: 15 U/L (ref 5–40)
BASOPHILS # BLD: 0.2 %
BASOPHILS ABSOLUTE: 0 THOU/MM3 (ref 0–0.1)
BILIRUB SERPL-MCNC: 0.2 MG/DL (ref 0.3–1.2)
BUN BLDV-MCNC: 6 MG/DL (ref 7–22)
CALCIUM SERPL-MCNC: 9.1 MG/DL (ref 8.5–10.5)
CHLORIDE BLD-SCNC: 103 MEQ/L (ref 98–111)
CO2: 27 MEQ/L (ref 23–33)
CREAT SERPL-MCNC: 0.5 MG/DL (ref 0.4–1.2)
EOSINOPHIL # BLD: 0 %
EOSINOPHILS ABSOLUTE: 0 THOU/MM3 (ref 0–0.4)
ERYTHROCYTE [DISTWIDTH] IN BLOOD BY AUTOMATED COUNT: 13.2 % (ref 11.5–14.5)
ERYTHROCYTE [DISTWIDTH] IN BLOOD BY AUTOMATED COUNT: 43.9 FL (ref 35–45)
GFR SERPL CREATININE-BSD FRML MDRD: > 90 ML/MIN/1.73M2
GLUCOSE BLD-MCNC: 118 MG/DL (ref 70–108)
HCT VFR BLD CALC: 43.6 % (ref 37–47)
HEMOGLOBIN: 14.2 GM/DL (ref 12–16)
IMMATURE GRANS (ABS): 0.12 THOU/MM3 (ref 0–0.07)
IMMATURE GRANULOCYTES: 0.9 %
LIPASE: 19.5 U/L (ref 5.6–51.3)
LYMPHOCYTES # BLD: 17.9 %
LYMPHOCYTES ABSOLUTE: 2.3 THOU/MM3 (ref 1–4.8)
MCH RBC QN AUTO: 30 PG (ref 26–33)
MCHC RBC AUTO-ENTMCNC: 32.6 GM/DL (ref 32.2–35.5)
MCV RBC AUTO: 92 FL (ref 81–99)
MONOCYTES # BLD: 3.9 %
MONOCYTES ABSOLUTE: 0.5 THOU/MM3 (ref 0.4–1.3)
NUCLEATED RED BLOOD CELLS: 0 /100 WBC
OSMOLALITY CALCULATION: 281.8 MOSMOL/KG (ref 275–300)
PLATELET # BLD: 420 THOU/MM3 (ref 130–400)
PMV BLD AUTO: 9.6 FL (ref 9.4–12.4)
POTASSIUM SERPL-SCNC: 3.6 MEQ/L (ref 3.5–5.2)
PREGNANCY, SERUM: NEGATIVE
RBC # BLD: 4.74 MILL/MM3 (ref 4.2–5.4)
SEG NEUTROPHILS: 77.1 %
SEGMENTED NEUTROPHILS ABSOLUTE COUNT: 10 THOU/MM3 (ref 1.8–7.7)
SODIUM BLD-SCNC: 142 MEQ/L (ref 135–145)
TOTAL PROTEIN: 7.9 G/DL (ref 6.1–8)
WBC # BLD: 13 THOU/MM3 (ref 4.8–10.8)

## 2022-03-25 PROCEDURE — 74176 CT ABD & PELVIS W/O CONTRAST: CPT

## 2022-03-25 PROCEDURE — 6370000000 HC RX 637 (ALT 250 FOR IP): Performed by: EMERGENCY MEDICINE

## 2022-03-25 PROCEDURE — 84703 CHORIONIC GONADOTROPIN ASSAY: CPT

## 2022-03-25 PROCEDURE — 2580000003 HC RX 258: Performed by: EMERGENCY MEDICINE

## 2022-03-25 PROCEDURE — 99284 EMERGENCY DEPT VISIT MOD MDM: CPT

## 2022-03-25 PROCEDURE — 83690 ASSAY OF LIPASE: CPT

## 2022-03-25 PROCEDURE — A4216 STERILE WATER/SALINE, 10 ML: HCPCS | Performed by: EMERGENCY MEDICINE

## 2022-03-25 PROCEDURE — C9113 INJ PANTOPRAZOLE SODIUM, VIA: HCPCS | Performed by: EMERGENCY MEDICINE

## 2022-03-25 PROCEDURE — 80053 COMPREHEN METABOLIC PANEL: CPT

## 2022-03-25 PROCEDURE — 96374 THER/PROPH/DIAG INJ IV PUSH: CPT

## 2022-03-25 PROCEDURE — 85025 COMPLETE CBC W/AUTO DIFF WBC: CPT

## 2022-03-25 PROCEDURE — 96375 TX/PRO/DX INJ NEW DRUG ADDON: CPT

## 2022-03-25 PROCEDURE — 6360000002 HC RX W HCPCS: Performed by: EMERGENCY MEDICINE

## 2022-03-25 RX ORDER — KETOROLAC TROMETHAMINE 30 MG/ML
30 INJECTION, SOLUTION INTRAMUSCULAR; INTRAVENOUS ONCE
Status: COMPLETED | OUTPATIENT
Start: 2022-03-25 | End: 2022-03-25

## 2022-03-25 RX ORDER — ONDANSETRON 4 MG/1
4 TABLET, ORALLY DISINTEGRATING ORAL EVERY 8 HOURS PRN
Qty: 20 TABLET | Refills: 0 | Status: SHIPPED | OUTPATIENT
Start: 2022-03-25 | End: 2022-03-25 | Stop reason: SDUPTHER

## 2022-03-25 RX ORDER — ONDANSETRON 2 MG/ML
4 INJECTION INTRAMUSCULAR; INTRAVENOUS ONCE
Status: COMPLETED | OUTPATIENT
Start: 2022-03-25 | End: 2022-03-25

## 2022-03-25 RX ORDER — DICYCLOMINE HYDROCHLORIDE 10 MG/1
10 CAPSULE ORAL
Qty: 30 CAPSULE | Refills: 0 | Status: SHIPPED | OUTPATIENT
Start: 2022-03-25

## 2022-03-25 RX ORDER — DICYCLOMINE HYDROCHLORIDE 10 MG/1
10 CAPSULE ORAL
Qty: 30 CAPSULE | Refills: 0 | Status: SHIPPED | OUTPATIENT
Start: 2022-03-25 | End: 2022-03-25 | Stop reason: SDUPTHER

## 2022-03-25 RX ORDER — 0.9 % SODIUM CHLORIDE 0.9 %
500 INTRAVENOUS SOLUTION INTRAVENOUS ONCE
Status: COMPLETED | OUTPATIENT
Start: 2022-03-25 | End: 2022-03-25

## 2022-03-25 RX ORDER — ONDANSETRON 4 MG/1
4 TABLET, ORALLY DISINTEGRATING ORAL EVERY 8 HOURS PRN
Qty: 20 TABLET | Refills: 0 | Status: SHIPPED | OUTPATIENT
Start: 2022-03-25

## 2022-03-25 RX ADMIN — SODIUM CHLORIDE 40 MG: 9 INJECTION, SOLUTION INTRAMUSCULAR; INTRAVENOUS; SUBCUTANEOUS at 13:47

## 2022-03-25 RX ADMIN — LIDOCAINE HYDROCHLORIDE: 20 SOLUTION ORAL; TOPICAL at 13:47

## 2022-03-25 RX ADMIN — ONDANSETRON 4 MG: 2 INJECTION INTRAMUSCULAR; INTRAVENOUS at 09:18

## 2022-03-25 RX ADMIN — KETOROLAC TROMETHAMINE 30 MG: 30 INJECTION, SOLUTION INTRAMUSCULAR; INTRAVENOUS at 09:18

## 2022-03-25 RX ADMIN — SODIUM CHLORIDE 500 ML: 9 INJECTION, SOLUTION INTRAVENOUS at 09:18

## 2022-03-25 ASSESSMENT — PAIN SCALES - GENERAL
PAINLEVEL_OUTOF10: 5
PAINLEVEL_OUTOF10: 0

## 2022-03-25 ASSESSMENT — PAIN - FUNCTIONAL ASSESSMENT: PAIN_FUNCTIONAL_ASSESSMENT: 0-10

## 2022-03-25 ASSESSMENT — PAIN DESCRIPTION - LOCATION: LOCATION: BACK

## 2022-03-25 NOTE — ED NOTES
Pt states zofran helped a little bit, but still feels nauseous. VSS and respirations are easy and unlabored. Pt was given a heated blanket and lights were shut off.  Call light in [facility]  03/25/22 6100

## 2022-03-25 NOTE — ED NOTES
Pt remains nauseous, and has requested antacid. VSS and respirations are easy and unlabored.  Light is off and call light in reach     [facility]  03/25/22 0742

## 2022-03-25 NOTE — ED PROVIDER NOTES
[address]      PATIENT NAME: Nicolas Ambrosio  MRN: [id number]  : 1995  [place]: 3/25/2022  PROVIDER: Charlane Boas, MD      CHIEF COMPLAINT       Chief Complaint   Patient presents with    Emesis    Nausea       Patient is seen and evaluated in a timely fashion. Nurses Notes are reviewed and I agree except as noted in the HPI. HISTORY OF PRESENT ILLNESS    Nicolas Ambrosio is a 23 y.o. female who presents to Emergency Department with Emesis and Nausea     Right flank pain with nausea vomiting since 1 AM today. No fever and no chills. Pain is moderate to severe, now pain is persistent. Pain radiates to the right lower abdomen. No diarrhea. No hematuria, no dysuria. Denies prior abdominal surgeries. This HPI was provided by patient.      REVIEW OF SYSTEMS   Ten-point review of systems is negative except those documented in above HPI including constitutional, HEENT, respiratory, cardiovascular, gastrointestinal, genitourinary, musculoskeletal, skin, neurological, hematological and behavioral.      PAST MEDICAL HISTORY     Past Medical History:   Diagnosis Date    Acid reflux     Asthma     Cyclic vomiting syndrome        SURGICAL HISTORY       Past Surgical History:   Procedure Laterality Date    ANKLE FRACTURE SURGERY Right 2014    EYE SURGERY      TONSILLECTOMY         CURRENT MEDICATIONS       Discharge Medication List as of 3/25/2022  2:15 PM      CONTINUE these medications which have NOT CHANGED    Details   !! ibuprofen (IBU) 600 MG tablet Take 1 tablet by mouth every 6 hours as needed for Pain, Disp-120 tablet, R-0Normal      pseudoephedrine (DECONGESTANT) 30 MG tablet Take 1 tablet by mouth every 4 hours as needed for Congestion, Disp-42 tablet, R-0Normal      benzonatate (TESSALON) 100 MG capsule Take 1-2 capsules by mouth 3 times daily as needed for Cough, Disp-42 capsule, R-0Normal      tucks (TUCKS) 50 % PADS Apply 1 each topically as equal, round, and reactive to light. Neck:      Vascular: No JVD. Trachea: No tracheal deviation. Cardiovascular:      Rate and Rhythm: Normal rate and regular rhythm. Heart sounds: Normal heart sounds. No murmur heard. No friction rub. No gallop. Pulmonary:      Effort: Pulmonary effort is normal. No respiratory distress. Breath sounds: Normal breath sounds. No stridor. No wheezing or rales. Chest:      Chest wall: No tenderness. Abdominal:      General: Bowel sounds are normal. There is no distension. Palpations: Abdomen is soft. There is no mass. Tenderness: There is abdominal tenderness. There is no guarding or rebound. Hernia: No hernia is present. Comments: Right upper quadrant tenderness. Musculoskeletal:         General: Tenderness present. No deformity. Cervical back: Normal range of motion and neck supple. Comments: Right CVA tenderness. Lymphadenopathy:      Cervical: No cervical adenopathy. Skin:     General: Skin is warm and dry. Capillary Refill: Capillary refill takes less than 2 seconds. Coloration: Skin is not pale. Findings: No erythema or rash. Neurological:      Mental Status: She is alert and oriented to person, place, and time. Cranial Nerves: No cranial nerve deficit. Sensory: No sensory deficit. Motor: No abnormal muscle tone. Coordination: Coordination normal.      Deep Tendon Reflexes: Reflexes normal.   Psychiatric:         Behavior: Behavior normal.         Thought Content: Thought content normal.         Judgment: Judgment normal.       ANCILLARY TEST RESULTS   EKG:    Interpreted by me  Not indicated    LAB RESULTS:  Results for orders placed or performed during the hospital encounter of 03/25/22   CBC with Auto Differential   Result Value Ref Range    WBC 13.0 (H) 4.8 - 10.8 thou/mm3    RBC 4.74 4.20 - 5.40 mill/mm3    Hemoglobin 14.2 12.0 - 16.0 gm/dl    Hematocrit 43.6 37.0 - 47.0 % MCV 92.0 81.0 - 99.0 fL    MCH 30.0 26.0 - 33.0 pg    MCHC 32.6 32.2 - 35.5 gm/dl    RDW-CV 13.2 11.5 - 14.5 %    RDW-SD 43.9 35.0 - 45.0 fL    Platelets 609 (H) 540 - 400 thou/mm3    MPV 9.6 9.4 - 12.4 fL    Seg Neutrophils 77.1 %    Lymphocytes 17.9 %    Monocytes 3.9 %    Eosinophils 0.0 %    Basophils 0.2 %    Immature Granulocytes 0.9 %    Segs Absolute 10.0 (H) 1.8 - 7.7 thou/mm3    Lymphocytes Absolute 2.3 1.0 - 4.8 thou/mm3    Monocytes Absolute 0.5 0.4 - 1.3 thou/mm3    Eosinophils Absolute 0.0 0.0 - 0.4 thou/mm3    Basophils Absolute 0.0 0.0 - 0.1 thou/mm3    Immature Grans (Abs) 0.12 (H) 0.00 - 0.07 thou/mm3    nRBC 0 /100 wbc   Comprehensive Metabolic Panel   Result Value Ref Range    Glucose 118 (H) 70 - 108 mg/dL    CREATININE 0.5 0.4 - 1.2 mg/dL    BUN 6 (L) 7 - 22 mg/dL    Sodium 142 135 - 145 meq/L    Potassium 3.6 3.5 - 5.2 meq/L    Chloride 103 98 - 111 meq/L    CO2 27 23 - 33 meq/L    Calcium 9.1 8.5 - 10.5 mg/dL    AST 15 5 - 40 U/L    Alkaline Phosphatase 90 38 - 126 U/L    Total Protein 7.9 6.1 - 8.0 g/dL    Albumin 4.3 3.5 - 5.1 g/dL    Total Bilirubin 0.2 (L) 0.3 - 1.2 mg/dL    ALT 6 (L) 11 - 66 U/L   Lipase   Result Value Ref Range    Lipase 19.5 5.6 - 51.3 U/L   HCG Qualitative, Serum   Result Value Ref Range    Preg, Serum NEGATIVE NEGATIVE   Anion Gap   Result Value Ref Range    Anion Gap 12.0 8.0 - 16.0 meq/L   Glomerular Filtration Rate, Estimated   Result Value Ref Range    Est, Glom Filt Rate >90 ml/min/1.73m2   Osmolality   Result Value Ref Range    Osmolality Calc 281.8 275.0 - 300.0 mOsmol/kg       RADIOLOGY REPORTS  CT ABDOMEN PELVIS WO CONTRAST Additional Contrast? None   Final Result   No acute process in the abdomen or pelvis. No evidence of hydronephrosis or other signs of obstructive uropathy. **This report has been created using voice recognition software. It may contain minor errors which are inherent in voice recognition technology. **      Final report electronically signed by Dr Reina Maldonado on 3/25/2022 10:29 AM          MEDICAL [facility] [address] ([facility])     Differential Diagnosis: Musculoskeletal pain, renal colic, biliary colic, pancreatitis, GERD, UTI, pyelonephritis    Initial Plans:   Large-bore IV  Normal saline bolus  IV Zofran and Toradol  Basic labs  CT abdomen pelvis    Summary:  Pain is better with ED treatment. No abdominal or flank tenderness on reassessment and he has a benign abdomen on reassessment. WBC is 13. Other labs are reassuring. CT A/P: No acute process in the abdomen or pelvis. No evidence of hydronephrosis or other signs of obstructive uropathy. She states she may have eaten something wrong last night. She feels better and wants to go home. Discharged with Bentyl and Zofran. PCP follow up in one week. ED Medications  Medications   0.9 % sodium chloride bolus (0 mLs IntraVENous Stopped 3/25/22 1159)   ondansetron (ZOFRAN) injection 4 mg (4 mg IntraVENous Given 3/25/22 0918)   ketorolac (TORADOL) injection 30 mg (30 mg IntraVENous Given 3/25/22 0918)   aluminum & magnesium hydroxide-simethicone (MAALOX) 30 mL, lidocaine viscous hcl (XYLOCAINE) 5 mL (GI COCKTAIL) ( Oral Given 3/25/22 1347)   pantoprazole (PROTONIX) 40 mg in sodium chloride (PF) 10 mL injection (40 mg IntraVENous Given 3/25/22 1347)     Vital signs in ED  Vitals:    03/25/22 0904 03/25/22 1212 03/25/22 1419   BP: 112/71 113/74    Pulse:  81    Resp: 16 16    Temp:   98.2 °F (36.8 °C)   TempSrc:   Oral   SpO2: 98% 95%    Weight: 166 lb (75.3 kg)     Height: 4' 11\" (1.499 m)         CRITICAL CARE   None    CONSULTS   None    PROCEDURES   None    FINAL IMPRESSION AND DISPOSITION      1.  Abdominal pain, epigastric        Home    PATIENT REFERRED TO:  YOVANNY Mclcoud - CNP  [address] [place] [address]  [phone]    In 1 week  ED discharge follow up      DISCHARGE MEDICATIONS:  Discharge Medication List as of 3/25/2022  2:15 PM          (Please note that portions of this note were completed with a voice recognition program.  Efforts were made to edit the dictations but occasionally words aremis-transcribed.)    MD Inna Awan MD  03/25/22 9164

## 2022-03-25 NOTE — LETTER
[facility] [address] [address] EMERGENCY DEPT  [address] [place] [id number]  Phone: [phone]             March 25, 2022    Patient: Maria Campos   YOB: 1995   Date of Visit: 3/25/2022       To Whom It May Concern:    Maria Campos was seen and treated in our emergency department on 3/25/2022. She may return to work on 3/28/22.       Sincerely,             Signature:__________________________________

## 2022-03-25 NOTE — ED TRIAGE NOTES
Pt arrived to 14 with nausea and vomiting. Pt states the nausea started at around 1 in the morning. Pt states patient cannot take anything for 12 hours. VSS stable and respirations are easy and unlabored. Call light in reach.

## 2022-04-29 ENCOUNTER — HOSPITAL ENCOUNTER (EMERGENCY)
Age: 27
Discharge: HOME | End: 2022-04-29

## 2022-04-29 VITALS
WEIGHT: 170 LBS | DIASTOLIC BLOOD PRESSURE: 56 MMHG | BODY MASS INDEX: 34.27 KG/M2 | HEART RATE: 104 BPM | OXYGEN SATURATION: 99 % | SYSTOLIC BLOOD PRESSURE: 127 MMHG | RESPIRATION RATE: 16 BRPM | TEMPERATURE: 98.2 F | HEIGHT: 59 IN

## 2022-04-29 DIAGNOSIS — N93.9 VAGINAL BLEEDING: Primary | ICD-10-CM

## 2022-04-29 LAB
ALBUMIN SERPL-MCNC: 4.7 G/DL (ref 3.5–5.1)
ALP BLD-CCNC: 100 U/L (ref 38–126)
ALT SERPL-CCNC: 6 U/L (ref 11–66)
ANION GAP SERPL CALCULATED.3IONS-SCNC: 15 MEQ/L (ref 8–16)
AST SERPL-CCNC: 14 U/L (ref 5–40)
BACTERIA: ABNORMAL
BASOPHILS # BLD: 0.3 %
BASOPHILS ABSOLUTE: 0 THOU/MM3 (ref 0–0.1)
BILIRUB SERPL-MCNC: 0.3 MG/DL (ref 0.3–1.2)
BILIRUBIN DIRECT: < 0.2 MG/DL (ref 0–0.3)
BILIRUBIN URINE: NEGATIVE
BLOOD, URINE: ABNORMAL
BUN BLDV-MCNC: 8 MG/DL (ref 7–22)
CALCIUM SERPL-MCNC: 9.7 MG/DL (ref 8.5–10.5)
CASTS: ABNORMAL /LPF
CASTS: ABNORMAL /LPF
CHARACTER, URINE: CLEAR
CHLORIDE BLD-SCNC: 103 MEQ/L (ref 98–111)
CO2: 23 MEQ/L (ref 23–33)
COLOR: YELLOW
CREAT SERPL-MCNC: 0.6 MG/DL (ref 0.4–1.2)
CRYSTALS: ABNORMAL
EOSINOPHIL # BLD: 0.2 %
EOSINOPHILS ABSOLUTE: 0 THOU/MM3 (ref 0–0.4)
EPITHELIAL CELLS, UA: ABNORMAL /HPF
ERYTHROCYTE [DISTWIDTH] IN BLOOD BY AUTOMATED COUNT: 14.2 % (ref 11.5–14.5)
ERYTHROCYTE [DISTWIDTH] IN BLOOD BY AUTOMATED COUNT: 47.8 FL (ref 35–45)
GFR SERPL CREATININE-BSD FRML MDRD: > 90 ML/MIN/1.73M2
GLUCOSE BLD-MCNC: 103 MG/DL (ref 70–108)
GLUCOSE, URINE: NEGATIVE MG/DL
HCG,BETA SUBUNIT,QUAL,SERUM: < 0.1 MIU/ML (ref 0–5)
HCT VFR BLD CALC: 40.1 % (ref 37–47)
HEMOGLOBIN: 12.8 GM/DL (ref 12–16)
IMMATURE GRANS (ABS): 0.04 THOU/MM3 (ref 0–0.07)
IMMATURE GRANULOCYTES: 0.3 %
KETONES, URINE: NEGATIVE
LEUKOCYTE ESTERASE, URINE: ABNORMAL
LYMPHOCYTES # BLD: 27.9 %
LYMPHOCYTES ABSOLUTE: 3.5 THOU/MM3 (ref 1–4.8)
MCH RBC QN AUTO: 29.8 PG (ref 26–33)
MCHC RBC AUTO-ENTMCNC: 31.9 GM/DL (ref 32.2–35.5)
MCV RBC AUTO: 93.3 FL (ref 81–99)
MISCELLANEOUS LAB TEST RESULT: ABNORMAL
MONOCYTES # BLD: 6.6 %
MONOCYTES ABSOLUTE: 0.8 THOU/MM3 (ref 0.4–1.3)
MUCUS: ABNORMAL
NITRITE, URINE: NEGATIVE
NUCLEATED RED BLOOD CELLS: 0 /100 WBC
OSMOLALITY CALCULATION: 279.8 MOSMOL/KG (ref 275–300)
PH UA: 5.5 (ref 5–9)
PLATELET # BLD: 379 THOU/MM3 (ref 130–400)
PMV BLD AUTO: 9.6 FL (ref 9.4–12.4)
POTASSIUM REFLEX MAGNESIUM: 4 MEQ/L (ref 3.5–5.2)
PROTEIN UA: NEGATIVE MG/DL
RBC # BLD: 4.3 MILL/MM3 (ref 4.2–5.4)
RBC URINE: ABNORMAL /HPF
RENAL EPITHELIAL, UA: ABNORMAL
SEG NEUTROPHILS: 64.7 %
SEGMENTED NEUTROPHILS ABSOLUTE COUNT: 8 THOU/MM3 (ref 1.8–7.7)
SODIUM BLD-SCNC: 141 MEQ/L (ref 135–145)
SPECIFIC GRAVITY UA: 1.02 (ref 1–1.03)
TOTAL PROTEIN: 7.5 G/DL (ref 6.1–8)
UROBILINOGEN, URINE: 0.2 EU/DL (ref 0–1)
WBC # BLD: 12.4 THOU/MM3 (ref 4.8–10.8)
WBC UA: ABNORMAL /HPF
YEAST: ABNORMAL

## 2022-04-29 PROCEDURE — 99283 EMERGENCY DEPT VISIT LOW MDM: CPT

## 2022-04-29 PROCEDURE — 84702 CHORIONIC GONADOTROPIN TEST: CPT

## 2022-04-29 PROCEDURE — 80048 BASIC METABOLIC PNL TOTAL CA: CPT

## 2022-04-29 PROCEDURE — 80076 HEPATIC FUNCTION PANEL: CPT

## 2022-04-29 PROCEDURE — 85025 COMPLETE CBC W/AUTO DIFF WBC: CPT

## 2022-04-29 PROCEDURE — 81001 URINALYSIS AUTO W/SCOPE: CPT

## 2022-04-29 ASSESSMENT — ENCOUNTER SYMPTOMS
WHEEZING: 0
NAUSEA: 0
DIARRHEA: 0
RHINORRHEA: 0
ABDOMINAL PAIN: 0
CONSTIPATION: 0
SHORTNESS OF BREATH: 0
VOMITING: 0
COUGH: 0
BLOOD IN STOOL: 0
EYE PAIN: 0

## 2022-04-29 NOTE — ED TRIAGE NOTES
Pt presents to ED for evaluation of abdominal pain with bleeding. Pt states there was a flesh colored clot present. Onset 4/28 around 1900. Pt states last period was last month. Lower abdominal pain rated 6/10. Vitals obtained at this time.

## 2022-04-29 NOTE — ED NOTES
Pt resting in bed at this time, no concerns voiced. Call light in reach. Family at bedside.       Sergei Haynes RN  04/29/22 4112

## 2022-04-29 NOTE — ED PROVIDER NOTES
[address] [facility] [address] [address] EMERGENCY DEPT  EMERGENCY MEDICINE     Pt Name: Janki Gallardo  MRN: [id number]  [place] 1995  Date of evaluation: 4/29/2022  PCP:    YOVANNY Koch CNP  Provider: YOVANNY Kwok CNP    CHIEF COMPLAINT       Chief Complaint   Patient presents with    Vaginal Bleeding           HISTORY OF PRESENT ILLNESS    Janki Gallardo is a 33 y.o. female patient that presents to ER with complaint of vaginal bleeding and abdominal cramping that occurred around 7 PM.  Patient states that her last menstrual period was about 30 days ago. She states that she is concerned because she \"passed a flesh colored clot at about 7 PM\". She states that at that time she had pelvic cramping. Patient states that she no longer has the cramping, but was concerned about the clot that she passed. She states that she still has the pad on that she had on when she passed the clot. She is unsure if she is pregnant. Of note, patient does have a Nexplanon birth control implant in her arm. Patient denies any current symptoms including chest pain, shortness of breath, nausea, vomiting, diarrhea, abdominal pain or fever. This provider examined the pad with the clot on it and it does appear to be clot with tissue. This does not appear to be an abnormal finding. Triage notes and Nursing notes were reviewed by myself. Any discrepancies are addressed above.     PAST MEDICAL HISTORY     Past Medical History:   Diagnosis Date    Acid reflux     Asthma     Cyclic vomiting syndrome        SURGICAL HISTORY       Past Surgical History:   Procedure Laterality Date    ANKLE FRACTURE SURGERY Right 2014    EYE SURGERY      TONSILLECTOMY         CURRENT MEDICATIONS       Previous Medications    ACETAMINOPHEN (AMINOFEN) 325 MG TABLET    Take 2 tablets by mouth every 6 hours as needed for Pain    ALBUTEROL SULFATE IN    Inhale into the lungs    DICYCLOMINE (BENTYL) 10 MG CAPSULE    Take 1 capsule by mouth 4 times daily (before meals and nightly)    IBUPROFEN (ADVIL;MOTRIN) 600 MG TABLET    Take 1 tablet by mouth every 6 hours as needed for Pain    IBUPROFEN (IBU) 600 MG TABLET    Take 1 tablet by mouth every 6 hours as needed for Pain    LIDOCAINE VISCOUS HCL (XYLOCAINE) 2 % SOLN SOLUTION    Place 15 mLs vaginally as needed for Irritation (apply to vaginal and rectal area)    ONDANSETRON (ZOFRAN ODT) 4 MG DISINTEGRATING TABLET    Take 1 tablet by mouth every 8 hours as needed for Nausea or Vomiting    PRENATAL MV-MIN-FE FUM-FA-DHA (PRENATAL 1 PO)    Take by mouth    TUCKS (TUCKS) 50 % PADS    Apply 1 each topically as needed for Hemorrhoids       ALLERGIES       Allergies   Allergen Reactions    Codeine Shortness Of Breath    Suprax [Cefixime] Hives     All over body       FAMILY HISTORY       Family History   Problem Relation Age of Onset    Diabetes Mother     Heart Attack Paternal Grandmother     Heart Attack Paternal Grandfather     Breast Cancer Other         maternal aunt        SOCIAL HISTORY       Social History     Socioeconomic History    Marital status: Single     Spouse name: None    Number of children: None    Years of education: None    Highest education level: None   Occupational History    None   Tobacco Use    Smoking status: Never Smoker    Smokeless tobacco: Never Used   Vaping Use    Vaping Use: Never used   Substance and Sexual Activity    Alcohol use: Not Currently    Drug use: Never    Sexual activity: Yes     Partners: Male   Other Topics Concern    None   Social History Narrative    None     Social Determinants of Health     Financial Resource Strain:     Difficulty of Paying Living Expenses: Not on file   Food Insecurity:     Worried About Running Out of Food in the Last Year: Not on file    Milad of Food in the Last Year: Not on file   Transportation Needs:     Lack of Transportation (Medical): Not on file    Lack of Transportation (Non-Medical):  Not on file   Physical Activity:  Days of Exercise per Week: Not on file    Minutes of Exercise per Session: Not on file   Stress:     Feeling of Stress : Not on file   Social Connections:     Frequency of Communication with Friends and Family: Not on file    Frequency of Social Gatherings with Friends and Family: Not on file    Attends [employer] Services: Not on file    Active Member of [address] [place] [address] or Organizations: Not on file    Attends Club or Organization Meetings: Not on file    Marital Status: Not on file   Intimate Partner Violence:     Fear of Current or Ex-Partner: Not on file    Emotionally Abused: Not on file    Physically Abused: Not on file    Sexually Abused: Not on file   Housing Stability:     Unable to Pay for Housing in the Last Year: Not on file    Number of [place] in the Last Year: Not on file    Unstable Housing in the Last Year: Not on file       REVIEW OF SYSTEMS     Review of Systems   Constitutional: Negative for appetite change, chills, fatigue, fever and unexpected weight change. HENT: Negative for ear pain and rhinorrhea. Eyes: Negative for pain and visual disturbance. Respiratory: Negative for cough, shortness of breath and wheezing. Cardiovascular: Negative for chest pain, palpitations and leg swelling. Gastrointestinal: Negative for abdominal pain, blood in stool, constipation, diarrhea, nausea and vomiting. Genitourinary: Positive for pelvic pain and vaginal bleeding. Negative for decreased urine volume, dysuria, frequency, hematuria and urgency. Musculoskeletal: Negative for arthralgias, joint swelling and neck stiffness. Skin: Negative for rash. Neurological: Negative for dizziness, syncope, weakness, light-headedness and headaches. Hematological: Does not bruise/bleed easily. Except as noted above the remainder of the review of systems was reviewed and is negative.   SCREENINGS        Jose Coma Scale  Eye Opening: Spontaneous  Best Verbal Response: Oriented  Best Motor Response: Obeys commands  Jose Coma Scale Score: 15               PHYSICAL EXAM    (up to 7 for level 4, 8 or more for level 5)     ED Triage Vitals [02/19/22 1512]   BP Temp Temp Source Pulse Resp SpO2 Height Weight   (!) 134/95 98.2 °F (36.8 °C) Oral 124 16 100 % -- --       Physical Exam  Vitals and nursing note reviewed. Constitutional:       Appearance: She is well-developed. She is not diaphoretic. HENT:      Head: Normocephalic and atraumatic. Right Ear: External ear normal.      Left Ear: External ear normal.   Eyes:      Conjunctiva/sclera: Conjunctivae normal.      Pupils: Pupils are equal, round, and reactive to light. Cardiovascular:      Rate and Rhythm: Normal rate and regular rhythm. Heart sounds: Normal heart sounds. No murmur heard. No gallop. Pulmonary:      Effort: Pulmonary effort is normal. No respiratory distress. Breath sounds: Normal breath sounds. No wheezing or rales. Abdominal:      General: Bowel sounds are normal. There is no distension. Palpations: Abdomen is soft. Tenderness: There is no abdominal tenderness. There is no right CVA tenderness, left CVA tenderness or guarding. Negative signs include Lynch's sign and obturator sign. Musculoskeletal:         General: Normal range of motion. Cervical back: Normal range of motion. Skin:     General: Skin is warm and dry. Neurological:      Mental Status: She is alert and oriented to person, place, and time. DIAGNOSTIC RESULTS     EKG:(none if blank)  All EKGs are interpreted by the Emergency Department Physician who either signs or Co-signs this chart in the absence of a cardiologist.        RADIOLOGY: (none if blank)   I directly visualized the following images and reviewed the radiologist interpretations.   Interpretation per the Radiologist below, if available at the time of this note:  No orders to display       LABS:  Labs Reviewed   [address] - Abnormal; Notable for the following components:       Result Value    WBC 12.4 (*)     MCHC 31.9 (*)     RDW-SD 47.8 (*)     Segs Absolute 8.0 (*)     All other components within normal limits   HEPATIC FUNCTION PANEL - Abnormal; Notable for the following components:    ALT 6 (*)     All other components within normal limits   URINALYSIS WITH MICROSCOPIC - Abnormal; Notable for the following components:    Blood, Urine MODERATE (*)     Leukocyte Esterase, Urine SMALL (*)     All other components within normal limits   BASIC METABOLIC PANEL W/ REFLEX TO MG FOR LOW K   HCG, QUANTITATIVE, PREGNANCY   ANION GAP   GLOMERULAR FILTRATION RATE, ESTIMATED   OSMOLALITY       All other labs were within normal range or not returned as of this dictation. Please note, any cultures that may have been sent were not resulted at the time of this patient visit. EMERGENCY DEPARTMENT COURSE and Medical Decision Making:     Vitals:    Vitals:    04/29/22 0300   BP: (!) 127/56   Pulse: 104   Resp: 16   Temp: 98.2 °F (36.8 °C)   TempSrc: Oral   SpO2: 99%   Weight: 170 lb (77.1 kg)   Height: 4' 11\" (1.499 m)       PROCEDURES: (None if blank)  Procedures    ED Course as of 04/29/22 0412   Fri Apr 29, 2022   3401 Reviewed case with Dr Catherine Pope. Juan Adams for discharge. [NW]      ED Course User Index  [NW] Lane Mode, APRN - CNP     MDM  Number of Diagnoses or Management Options     Amount and/or Complexity of Data Reviewed  Clinical lab tests: ordered and reviewed    Risk of Complications, Morbidity, and/or Mortality  Presenting problems: minimal  Diagnostic procedures: low  Management options: minimal      Patient that presents to ER with complaint of vaginal bleeding and abdominal cramping that occurred around 7 PM.  Patient states that her last menstrual period was about 30 days ago. Differential diagnosis includes but not limited to menstrual cycle, pregnancy, miscarriage or threatened miscarriage. Abs are reassuring.   Serum quantitative pregnancy test is negative. Patient will be discharged home. Patient instructed to return to ER for worsening symptoms, severe abdominal pain, chest pain, inability to keep liquids down, inability to urinate for greater than 8 hours or difficulty breathing. Follow-up with your primary care provider. Continue clear liquids for today and then may try bland diet. Strict return precautions and follow up instructions were discussed with the patient with which the patient agrees    ED Medications administered this visit:  Medications - No data to display      FINAL IMPRESSION      1.  Vaginal bleeding          DISPOSITION/PLAN   DISPOSITION Decision To Discharge 04/29/2022 04:12:20 AM      PATIENT REFERRED TO:  YOVANNY Chau CNP  [address] [place] [address]  [phone]            DISCHARGE MEDICATIONS:  New Prescriptions    No medications on file              YOVANNY Cortez CNP (electronically signed)           YOVANNY Cortez CNP  04/29/22 6737

## 2022-05-02 ENCOUNTER — APPOINTMENT (OUTPATIENT)
Dept: CT IMAGING | Age: 27
DRG: 204 | End: 2022-05-02
Payer: MEDICAID

## 2022-05-02 ENCOUNTER — APPOINTMENT (OUTPATIENT)
Dept: GENERAL RADIOLOGY | Age: 27
DRG: 204 | End: 2022-05-02
Payer: MEDICAID

## 2022-05-02 ENCOUNTER — HOSPITAL ENCOUNTER (INPATIENT)
Age: 27
LOS: 3 days | Discharge: HOME | DRG: 204 | End: 2022-05-06
Attending: STUDENT IN AN ORGANIZED HEALTH CARE EDUCATION/TRAINING PROGRAM | Admitting: FAMILY MEDICINE
Payer: MEDICAID

## 2022-05-02 DIAGNOSIS — R41.82 ALTERED MENTAL STATUS, UNSPECIFIED ALTERED MENTAL STATUS TYPE: Primary | ICD-10-CM

## 2022-05-02 DIAGNOSIS — R56.9 SEIZURE-LIKE ACTIVITY (HCC): ICD-10-CM

## 2022-05-02 LAB
AMPHETAMINE+METHAMPHETAMINE URINE SCREEN: NEGATIVE
BACTERIA: ABNORMAL
BARBITURATE QUANTITATIVE URINE: NEGATIVE
BASE EXCESS MIXED: -2.2 MMOL/L (ref -2–3)
BASOPHILS # BLD: 0.2 %
BASOPHILS ABSOLUTE: 0 THOU/MM3 (ref 0–0.1)
BENZODIAZEPINE QUANTITATIVE URINE: NEGATIVE
BILIRUBIN URINE: NEGATIVE
BLOOD, URINE: NEGATIVE
CANNABINOID QUANTITATIVE URINE: POSITIVE
CASTS: ABNORMAL /LPF
CASTS: ABNORMAL /LPF
CHARACTER, URINE: CLEAR
COCAINE METABOLITE QUANTITATIVE URINE: NEGATIVE
COLLECTED BY:: ABNORMAL
COLOR: YELLOW
CRYSTALS: ABNORMAL
DEVICE: ABNORMAL
EOSINOPHIL # BLD: 0 %
EOSINOPHILS ABSOLUTE: 0 THOU/MM3 (ref 0–0.4)
EPITHELIAL CELLS, UA: ABNORMAL /HPF
ERYTHROCYTE [DISTWIDTH] IN BLOOD BY AUTOMATED COUNT: 13.9 % (ref 11.5–14.5)
ERYTHROCYTE [DISTWIDTH] IN BLOOD BY AUTOMATED COUNT: 50.3 FL (ref 35–45)
FIO2, MIXED VENOUS: 21
GLUCOSE, URINE: NEGATIVE MG/DL
HCO3, MIXED: 24 MMOL/L (ref 23–28)
HCT VFR BLD CALC: 40.7 % (ref 37–47)
HEMOGLOBIN: 12.4 GM/DL (ref 12–16)
IMMATURE GRANS (ABS): 0.05 THOU/MM3 (ref 0–0.07)
IMMATURE GRANULOCYTES: 0.4 %
KETONES, URINE: ABNORMAL
LEUKOCYTE ESTERASE, URINE: NEGATIVE
LYMPHOCYTES # BLD: 8.4 %
LYMPHOCYTES ABSOLUTE: 1.1 THOU/MM3 (ref 1–4.8)
MCH RBC QN AUTO: 29.8 PG (ref 26–33)
MCHC RBC AUTO-ENTMCNC: 30.5 GM/DL (ref 32.2–35.5)
MCV RBC AUTO: 97.8 FL (ref 81–99)
MISCELLANEOUS LAB TEST RESULT: ABNORMAL
MONOCYTES # BLD: 4 %
MONOCYTES ABSOLUTE: 0.5 THOU/MM3 (ref 0.4–1.3)
MUCUS: ABNORMAL
NITRITE, URINE: NEGATIVE
NUCLEATED RED BLOOD CELLS: 0 /100 WBC
O2 SAT, MIXED: 78 %
OPIATES, URINE: NEGATIVE
OXYCODONE: NEGATIVE
PCO2, MIXED VENOUS: 44 MMHG (ref 41–51)
PH UA: 5.5 (ref 5–9)
PH, MIXED: 7.34 (ref 7.31–7.41)
PHENCYCLIDINE QUANTITATIVE URINE: NEGATIVE
PLATELET # BLD: 313 THOU/MM3 (ref 130–400)
PMV BLD AUTO: 10.2 FL (ref 9.4–12.4)
PO2 MIXED: 45 MMHG (ref 25–40)
PREGNANCY, URINE: NEGATIVE
PROTEIN UA: 30 MG/DL
RBC # BLD: 4.16 MILL/MM3 (ref 4.2–5.4)
RBC URINE: ABNORMAL /HPF
RENAL EPITHELIAL, UA: ABNORMAL
SARS-COV-2, NAAT: NOT  DETECTED
SEG NEUTROPHILS: 87 %
SEGMENTED NEUTROPHILS ABSOLUTE COUNT: 11.5 THOU/MM3 (ref 1.8–7.7)
SPECIFIC GRAVITY UA: 1.03 (ref 1–1.03)
UROBILINOGEN, URINE: 1 EU/DL (ref 0–1)
WBC # BLD: 13.2 THOU/MM3 (ref 4.8–10.8)
WBC UA: ABNORMAL /HPF
YEAST: ABNORMAL

## 2022-05-02 PROCEDURE — 80179 DRUG ASSAY SALICYLATE: CPT

## 2022-05-02 PROCEDURE — 93005 ELECTROCARDIOGRAM TRACING: CPT | Performed by: STUDENT IN AN ORGANIZED HEALTH CARE EDUCATION/TRAINING PROGRAM

## 2022-05-02 PROCEDURE — 70450 CT HEAD/BRAIN W/O DYE: CPT

## 2022-05-02 PROCEDURE — 99285 EMERGENCY DEPT VISIT HI MDM: CPT

## 2022-05-02 PROCEDURE — 96375 TX/PRO/DX INJ NEW DRUG ADDON: CPT

## 2022-05-02 PROCEDURE — 96365 THER/PROPH/DIAG IV INF INIT: CPT

## 2022-05-02 PROCEDURE — 80307 DRUG TEST PRSMV CHEM ANLYZR: CPT

## 2022-05-02 PROCEDURE — 81025 URINE PREGNANCY TEST: CPT

## 2022-05-02 PROCEDURE — 85025 COMPLETE CBC W/AUTO DIFF WBC: CPT

## 2022-05-02 PROCEDURE — 71045 X-RAY EXAM CHEST 1 VIEW: CPT

## 2022-05-02 PROCEDURE — 87040 BLOOD CULTURE FOR BACTERIA: CPT

## 2022-05-02 PROCEDURE — 36415 COLL VENOUS BLD VENIPUNCTURE: CPT

## 2022-05-02 PROCEDURE — 83605 ASSAY OF LACTIC ACID: CPT

## 2022-05-02 PROCEDURE — 82803 BLOOD GASES ANY COMBINATION: CPT

## 2022-05-02 PROCEDURE — 82140 ASSAY OF AMMONIA: CPT

## 2022-05-02 PROCEDURE — 6360000002 HC RX W HCPCS

## 2022-05-02 PROCEDURE — 96374 THER/PROPH/DIAG INJ IV PUSH: CPT

## 2022-05-02 PROCEDURE — 82077 ASSAY SPEC XCP UR&BREATH IA: CPT

## 2022-05-02 PROCEDURE — 2580000003 HC RX 258: Performed by: STUDENT IN AN ORGANIZED HEALTH CARE EDUCATION/TRAINING PROGRAM

## 2022-05-02 PROCEDURE — 87635 SARS-COV-2 COVID-19 AMP PRB: CPT

## 2022-05-02 PROCEDURE — 84484 ASSAY OF TROPONIN QUANT: CPT

## 2022-05-02 PROCEDURE — 80143 DRUG ASSAY ACETAMINOPHEN: CPT

## 2022-05-02 PROCEDURE — 81001 URINALYSIS AUTO W/SCOPE: CPT

## 2022-05-02 PROCEDURE — 80048 BASIC METABOLIC PNL TOTAL CA: CPT

## 2022-05-02 PROCEDURE — 6360000002 HC RX W HCPCS: Performed by: STUDENT IN AN ORGANIZED HEALTH CARE EDUCATION/TRAINING PROGRAM

## 2022-05-02 PROCEDURE — 2580000003 HC RX 258

## 2022-05-02 PROCEDURE — 80076 HEPATIC FUNCTION PANEL: CPT

## 2022-05-02 RX ORDER — ONDANSETRON 2 MG/ML
INJECTION INTRAMUSCULAR; INTRAVENOUS
Status: COMPLETED
Start: 2022-05-02 | End: 2022-05-02

## 2022-05-02 RX ORDER — NALOXONE HYDROCHLORIDE 0.4 MG/ML
0.4 INJECTION, SOLUTION INTRAMUSCULAR; INTRAVENOUS; SUBCUTANEOUS ONCE
Status: COMPLETED | OUTPATIENT
Start: 2022-05-02 | End: 2022-05-02

## 2022-05-02 RX ORDER — 0.9 % SODIUM CHLORIDE 0.9 %
30 INTRAVENOUS SOLUTION INTRAVENOUS ONCE
Status: COMPLETED | OUTPATIENT
Start: 2022-05-02 | End: 2022-05-03

## 2022-05-02 RX ORDER — LORAZEPAM 2 MG/ML
INJECTION INTRAMUSCULAR
Status: COMPLETED
Start: 2022-05-02 | End: 2022-05-02

## 2022-05-02 RX ORDER — NALOXONE HYDROCHLORIDE 0.4 MG/ML
INJECTION, SOLUTION INTRAMUSCULAR; INTRAVENOUS; SUBCUTANEOUS
Status: COMPLETED
Start: 2022-05-02 | End: 2022-05-02

## 2022-05-02 RX ORDER — LORAZEPAM 2 MG/ML
2 INJECTION INTRAMUSCULAR ONCE
Status: COMPLETED | OUTPATIENT
Start: 2022-05-02 | End: 2022-05-02

## 2022-05-02 RX ORDER — ONDANSETRON 2 MG/ML
4 INJECTION INTRAMUSCULAR; INTRAVENOUS ONCE
Status: COMPLETED | OUTPATIENT
Start: 2022-05-02 | End: 2022-05-02

## 2022-05-02 RX ADMIN — ONDANSETRON 4 MG: 2 INJECTION INTRAMUSCULAR; INTRAVENOUS at 22:01

## 2022-05-02 RX ADMIN — NALOXONE HYDROCHLORIDE 0.4 MG: 0.4 INJECTION, SOLUTION INTRAMUSCULAR; INTRAVENOUS; SUBCUTANEOUS at 22:00

## 2022-05-02 RX ADMIN — SODIUM CHLORIDE 2313 ML: 9 INJECTION, SOLUTION INTRAVENOUS at 23:31

## 2022-05-02 RX ADMIN — LEVETIRACETAM 2000 MG: 100 INJECTION, SOLUTION INTRAVENOUS at 22:26

## 2022-05-02 RX ADMIN — LORAZEPAM 2 MG: 2 INJECTION INTRAMUSCULAR at 22:09

## 2022-05-02 RX ADMIN — LORAZEPAM 2 MG: 2 INJECTION INTRAMUSCULAR at 22:02

## 2022-05-02 RX ADMIN — LORAZEPAM 2 MG: 2 INJECTION INTRAMUSCULAR; INTRAVENOUS at 22:02

## 2022-05-02 RX ADMIN — LORAZEPAM 2 MG: 2 INJECTION INTRAMUSCULAR; INTRAVENOUS at 22:09

## 2022-05-02 NOTE — LETTER
[facility]   [address] [facility] [address]  Phone: [phone]    No name on file. May 5, 2022     Patient: Lazara Brown   YOB: 1995   Date of Visit: 5/2/2022-5/6/2022       To Whom It May Concern: It is my medical opinion that Lazara Brown may return to full duty immediately with no restrictions. If you have any questions or concerns, please don't hesitate to call.     Sincerely,        Laurelyn Schaumann, RN

## 2022-05-03 ENCOUNTER — APPOINTMENT (OUTPATIENT)
Dept: MRI IMAGING | Age: 27
DRG: 204 | End: 2022-05-03
Payer: MEDICAID

## 2022-05-03 ENCOUNTER — APPOINTMENT (OUTPATIENT)
Dept: GENERAL RADIOLOGY | Age: 27
DRG: 204 | End: 2022-05-03
Payer: MEDICAID

## 2022-05-03 PROBLEM — G93.40 ACUTE ENCEPHALOPATHY: Status: ACTIVE | Noted: 2022-05-03

## 2022-05-03 LAB
ACETAMINOPHEN LEVEL: < 5 UG/ML (ref 0–20)
ALBUMIN SERPL-MCNC: 4 G/DL (ref 3.5–5.1)
ALP BLD-CCNC: 95 U/L (ref 38–126)
ALT SERPL-CCNC: 6 U/L (ref 11–66)
AMMONIA: 20 UMOL/L (ref 11–60)
ANION GAP SERPL CALCULATED.3IONS-SCNC: 16 MEQ/L (ref 8–16)
AST SERPL-CCNC: 14 U/L (ref 5–40)
BASE EXCESS (CALCULATED): -1.2 MMOL/L (ref -2.5–2.5)
BILIRUB SERPL-MCNC: 0.3 MG/DL (ref 0.3–1.2)
BILIRUBIN DIRECT: < 0.2 MG/DL (ref 0–0.3)
BUN BLDV-MCNC: 8 MG/DL (ref 7–22)
CALCIUM SERPL-MCNC: 8.5 MG/DL (ref 8.5–10.5)
CHLORIDE BLD-SCNC: 107 MEQ/L (ref 98–111)
CO2: 20 MEQ/L (ref 23–33)
COLLECTED BY:: NORMAL
CREAT SERPL-MCNC: 0.5 MG/DL (ref 0.4–1.2)
DEVICE: NORMAL
EKG ATRIAL RATE: 152 BPM
EKG P AXIS: 68 DEGREES
EKG P-R INTERVAL: 118 MS
EKG Q-T INTERVAL: 264 MS
EKG QRS DURATION: 76 MS
EKG QTC CALCULATION (BAZETT): 419 MS
EKG R AXIS: 42 DEGREES
EKG T AXIS: 52 DEGREES
EKG VENTRICULAR RATE: 152 BPM
ETHYL ALCOHOL, SERUM: < 0.01 %
GFR SERPL CREATININE-BSD FRML MDRD: > 90 ML/MIN/1.73M2
GLUCOSE BLD-MCNC: 125 MG/DL (ref 70–108)
HCO3: 23 MMOL/L (ref 23–28)
LACTIC ACID, SEPSIS: 3 MMOL/L (ref 0.5–1.9)
LACTIC ACID: 0.7 MMOL/L (ref 0.5–2)
O2 SATURATION: 94 %
PCO2: 38 MMHG (ref 35–45)
PH BLOOD GAS: 7.4 (ref 7.35–7.45)
PO2: 72 MMHG (ref 71–104)
POTASSIUM REFLEX MAGNESIUM: 3.8 MEQ/L (ref 3.5–5.2)
PROCALCITONIN: 0.11 NG/ML (ref 0.01–0.09)
SALICYLATE, SERUM: < 0.3 MG/DL (ref 2–10)
SODIUM BLD-SCNC: 143 MEQ/L (ref 135–145)
SOURCE, BLOOD GAS: NORMAL
TOTAL PROTEIN: 7.1 G/DL (ref 6.1–8)
TROPONIN T: < 0.01 NG/ML

## 2022-05-03 PROCEDURE — 99233 SBSQ HOSP IP/OBS HIGH 50: CPT | Performed by: INTERNAL MEDICINE

## 2022-05-03 PROCEDURE — 71045 X-RAY EXAM CHEST 1 VIEW: CPT

## 2022-05-03 PROCEDURE — 95819 EEG AWAKE AND ASLEEP: CPT | Performed by: PSYCHIATRY & NEUROLOGY

## 2022-05-03 PROCEDURE — 6360000004 HC RX CONTRAST MEDICATION

## 2022-05-03 PROCEDURE — 82803 BLOOD GASES ANY COMBINATION: CPT

## 2022-05-03 PROCEDURE — 93010 ELECTROCARDIOGRAM REPORT: CPT | Performed by: INTERNAL MEDICINE

## 2022-05-03 PROCEDURE — 36568 INSJ PICC <5 YR W/O IMAGING: CPT

## 2022-05-03 PROCEDURE — 70544 MR ANGIOGRAPHY HEAD W/O DYE: CPT

## 2022-05-03 PROCEDURE — 36415 COLL VENOUS BLD VENIPUNCTURE: CPT

## 2022-05-03 PROCEDURE — 6370000000 HC RX 637 (ALT 250 FOR IP): Performed by: PHYSICIAN ASSISTANT

## 2022-05-03 PROCEDURE — 6360000002 HC RX W HCPCS: Performed by: PHYSICIAN ASSISTANT

## 2022-05-03 PROCEDURE — 70553 MRI BRAIN STEM W/O & W/DYE: CPT

## 2022-05-03 PROCEDURE — 99222 1ST HOSP IP/OBS MODERATE 55: CPT | Performed by: PHYSICIAN ASSISTANT

## 2022-05-03 PROCEDURE — 95819 EEG AWAKE AND ASLEEP: CPT

## 2022-05-03 PROCEDURE — 02HV33Z INSERTION OF INFUSION DEVICE INTO SUPERIOR VENA CAVA, PERCUTANEOUS APPROACH: ICD-10-PCS | Performed by: INTERNAL MEDICINE

## 2022-05-03 PROCEDURE — 83605 ASSAY OF LACTIC ACID: CPT

## 2022-05-03 PROCEDURE — 6370000000 HC RX 637 (ALT 250 FOR IP)

## 2022-05-03 PROCEDURE — 76937 US GUIDE VASCULAR ACCESS: CPT

## 2022-05-03 PROCEDURE — 84145 PROCALCITONIN (PCT): CPT

## 2022-05-03 PROCEDURE — 70549 MR ANGIOGRAPH NECK W/O&W/DYE: CPT

## 2022-05-03 PROCEDURE — A9579 GAD-BASE MR CONTRAST NOS,1ML: HCPCS

## 2022-05-03 PROCEDURE — 2060000000 HC ICU INTERMEDIATE R&B

## 2022-05-03 PROCEDURE — C1751 CATH, INF, PER/CENT/MIDLINE: HCPCS

## 2022-05-03 PROCEDURE — 2580000003 HC RX 258: Performed by: PHYSICIAN ASSISTANT

## 2022-05-03 PROCEDURE — 36600 WITHDRAWAL OF ARTERIAL BLOOD: CPT

## 2022-05-03 RX ORDER — MAGNESIUM SULFATE IN WATER 40 MG/ML
2000 INJECTION, SOLUTION INTRAVENOUS PRN
Status: DISCONTINUED | OUTPATIENT
Start: 2022-05-03 | End: 2022-05-06 | Stop reason: HOSPADM

## 2022-05-03 RX ORDER — SODIUM CHLORIDE 0.9 % (FLUSH) 0.9 %
5-40 SYRINGE (ML) INJECTION EVERY 12 HOURS SCHEDULED
Status: DISCONTINUED | OUTPATIENT
Start: 2022-05-03 | End: 2022-05-06 | Stop reason: HOSPADM

## 2022-05-03 RX ORDER — SODIUM CHLORIDE 9 MG/ML
INJECTION, SOLUTION INTRAVENOUS CONTINUOUS
Status: DISCONTINUED | OUTPATIENT
Start: 2022-05-03 | End: 2022-05-05

## 2022-05-03 RX ORDER — LEVOFLOXACIN 5 MG/ML
750 INJECTION, SOLUTION INTRAVENOUS EVERY 24 HOURS
Status: DISCONTINUED | OUTPATIENT
Start: 2022-05-03 | End: 2022-05-05

## 2022-05-03 RX ORDER — POTASSIUM CHLORIDE 7.45 MG/ML
10 INJECTION INTRAVENOUS PRN
Status: DISCONTINUED | OUTPATIENT
Start: 2022-05-03 | End: 2022-05-06 | Stop reason: HOSPADM

## 2022-05-03 RX ORDER — OLANZAPINE 2.5 MG/1
2.5 TABLET ORAL NIGHTLY
Status: ON HOLD | COMMUNITY
End: 2022-05-06 | Stop reason: HOSPADM

## 2022-05-03 RX ORDER — ACETAMINOPHEN 650 MG/1
650 SUPPOSITORY RECTAL EVERY 6 HOURS PRN
Status: DISCONTINUED | OUTPATIENT
Start: 2022-05-03 | End: 2022-05-06 | Stop reason: HOSPADM

## 2022-05-03 RX ORDER — ACETAMINOPHEN 325 MG/1
650 TABLET ORAL EVERY 6 HOURS PRN
Status: DISCONTINUED | OUTPATIENT
Start: 2022-05-03 | End: 2022-05-06 | Stop reason: HOSPADM

## 2022-05-03 RX ORDER — SODIUM CHLORIDE 0.9 % (FLUSH) 0.9 %
5-40 SYRINGE (ML) INJECTION PRN
Status: DISCONTINUED | OUTPATIENT
Start: 2022-05-03 | End: 2022-05-06 | Stop reason: HOSPADM

## 2022-05-03 RX ORDER — ENOXAPARIN SODIUM 100 MG/ML
40 INJECTION SUBCUTANEOUS DAILY
Status: DISCONTINUED | OUTPATIENT
Start: 2022-05-03 | End: 2022-05-06 | Stop reason: HOSPADM

## 2022-05-03 RX ORDER — AMMONIA INHALANTS 0.04 G/.3ML
0.3 INHALANT RESPIRATORY (INHALATION) ONCE
Status: COMPLETED | OUTPATIENT
Start: 2022-05-03 | End: 2022-05-03

## 2022-05-03 RX ORDER — ONDANSETRON 4 MG/1
4 TABLET, ORALLY DISINTEGRATING ORAL EVERY 8 HOURS PRN
Status: DISCONTINUED | OUTPATIENT
Start: 2022-05-03 | End: 2022-05-06 | Stop reason: HOSPADM

## 2022-05-03 RX ORDER — AMMONIA INHALANTS 0.04 G/.3ML
INHALANT RESPIRATORY (INHALATION)
Status: COMPLETED
Start: 2022-05-03 | End: 2022-05-03

## 2022-05-03 RX ORDER — ONDANSETRON 2 MG/ML
4 INJECTION INTRAMUSCULAR; INTRAVENOUS EVERY 6 HOURS PRN
Status: DISCONTINUED | OUTPATIENT
Start: 2022-05-03 | End: 2022-05-06 | Stop reason: HOSPADM

## 2022-05-03 RX ORDER — POLYETHYLENE GLYCOL 3350 17 G/17G
17 POWDER, FOR SOLUTION ORAL DAILY PRN
Status: DISCONTINUED | OUTPATIENT
Start: 2022-05-03 | End: 2022-05-06 | Stop reason: HOSPADM

## 2022-05-03 RX ORDER — POTASSIUM CHLORIDE 20 MEQ/1
40 TABLET, EXTENDED RELEASE ORAL PRN
Status: DISCONTINUED | OUTPATIENT
Start: 2022-05-03 | End: 2022-05-06 | Stop reason: HOSPADM

## 2022-05-03 RX ORDER — SODIUM CHLORIDE 9 MG/ML
INJECTION, SOLUTION INTRAVENOUS PRN
Status: DISCONTINUED | OUTPATIENT
Start: 2022-05-03 | End: 2022-05-06 | Stop reason: HOSPADM

## 2022-05-03 RX ADMIN — SODIUM CHLORIDE, PRESERVATIVE FREE 10 ML: 5 INJECTION INTRAVENOUS at 20:11

## 2022-05-03 RX ADMIN — SODIUM CHLORIDE, PRESERVATIVE FREE 10 ML: 5 INJECTION INTRAVENOUS at 09:00

## 2022-05-03 RX ADMIN — Medication 1250 MG: at 10:21

## 2022-05-03 RX ADMIN — ACYCLOVIR SODIUM 800 MG: 1000 INJECTION, SOLUTION INTRAVENOUS at 09:00

## 2022-05-03 RX ADMIN — LEVETIRACETAM 1000 MG: 100 INJECTION, SOLUTION INTRAVENOUS at 08:58

## 2022-05-03 RX ADMIN — AMMONIA INHALANTS 0.3 ML: 0.04 INHALANT RESPIRATORY (INHALATION) at 03:46

## 2022-05-03 RX ADMIN — SODIUM CHLORIDE: 9 INJECTION, SOLUTION INTRAVENOUS at 06:29

## 2022-05-03 RX ADMIN — Medication 1250 MG: at 20:16

## 2022-05-03 RX ADMIN — GADOTERIDOL 15 ML: 279.3 INJECTION, SOLUTION INTRAVENOUS at 15:20

## 2022-05-03 RX ADMIN — ACETAMINOPHEN 650 MG: 325 TABLET ORAL at 23:03

## 2022-05-03 RX ADMIN — LEVOFLOXACIN 750 MG: 750 INJECTION, SOLUTION INTRAVENOUS at 09:29

## 2022-05-03 ASSESSMENT — PAIN - FUNCTIONAL ASSESSMENT
PAIN_FUNCTIONAL_ASSESSMENT: PREVENTS OR INTERFERES SOME ACTIVE ACTIVITIES AND ADLS
PAIN_FUNCTIONAL_ASSESSMENT: NONE - DENIES PAIN

## 2022-05-03 ASSESSMENT — PAIN DESCRIPTION - ONSET: ONSET: ON-GOING

## 2022-05-03 ASSESSMENT — PAIN DESCRIPTION - ORIENTATION: ORIENTATION: LOWER;RIGHT;LEFT

## 2022-05-03 ASSESSMENT — PAIN DESCRIPTION - PAIN TYPE: TYPE: CHRONIC PAIN

## 2022-05-03 ASSESSMENT — PAIN DESCRIPTION - FREQUENCY: FREQUENCY: INTERMITTENT

## 2022-05-03 ASSESSMENT — PAIN SCALES - GENERAL
PAINLEVEL_OUTOF10: 3
PAINLEVEL_OUTOF10: 0

## 2022-05-03 ASSESSMENT — PAIN DESCRIPTION - LOCATION: LOCATION: BACK;HIP

## 2022-05-03 ASSESSMENT — PAIN DESCRIPTION - DESCRIPTORS: DESCRIPTORS: ACHING

## 2022-05-03 NOTE — ED PROVIDER NOTES
[address] [facility]  EMERGENCY MEDICINE ATTENDING ATTESTATION      Evaluation of Alfa Anders. Case discussed and care plan developed with resident physician. I agree with the resident physician documentation and plan as documented by him, except if my documentation differs. Patient seen, interviewed and examined by me. I reviewed the medical, surgical, family and social history, medications and allergies. I have reviewed the nursing documentation. I have reviewed the patient's vital signs and are abnormal from Tachycardia, borderline febrile per my interpretation. Body mass index is 34.34 kg/m². Pulsoxymetry is normal per my interpretation. Brief H&P   Patient c/o nausea/vomiting, then became unresponsive prior to arrival while in EMS rig. Patient with possible seizure-like activity on examination however patient also noted to have purposeful movements during these episodes still unclear whether it was true seizure activity. Patient given 4 mg of Ativan, 2 g of Keppra. CT head negative. Laboratory work-up shows UDS positive for cannabinoids, minimal leukocytosis of 13 which appears to be similar to multiple previous ED visits, unclear if reactive from vomiting versus questionable seizure versus other process. Patient with some improvement in mental status over ED stay unresponsive to painful stimuli but remains altered. Patient noted to have abnormal behaviors such as when providers would comment on lack of rigidity in 1 arm, the arm suddenly became rigid. Medical Decision Making   MDM:   1. Patient is a 50-year-old female with a history of cyclical vomiting and unclear psychiatric history on Zyprexa who presents with altered mental status and questionable seizure activity. Plan:    Admit to hospital for furtherEvaluation and management of altered mental status as patient has not returned to baseline after 7-hour observation in the ED.     Please see the resident physician completed note for final disposition except as documented on this attestation. I have reviewed and interpreted all available lab, radiology and ekg results available at the moment. Diagnosis, treatment and disposition plans were discussed and agreed upon by patient. This transcription was electronically signed. It was dictated by use of voice recognition software and electronically transcribed. The transcription may contain errors not detected in proofreading.      I performed direct supervision and was present for the critical portion following procedures: None  Critical care time on this case: None    Electronically signed by Gold Burnett MD on 5/3/22 at 4:37 AM EDT        Gold Burnett MD  05/04/22 0133

## 2022-05-03 NOTE — ED PROVIDER NOTES
[facility] ENCOUNTER          Pt Name: Jovany Guthrie  MRN: [id number]  [place] 1995  Date of evaluation: 5/2/2022  Treating Resident Physician: Amalia Schreiber MD  Supervising Physician: Marielle Oreilly COMPLAINT       Chief Complaint   Patient presents with    Altered Mental Status     History obtained from unobtainable from patient due to mental status. HISTORY OF PRESENT ILLNESS    HPI  Jovany Guthrie is a 33 y.o. female who presents to the emergency department for evaluation of unresponsive    Per EMS, patient was eating lunch at her place of business, [place], when she started vomiting. A time the ambulance arrived patient become unresponsive. Blood glucose was was in the 130s. She was transported to the emergency department and remained unresponsive. Patient came in with vomit bag sitting on chest with vomit and it. Patient was sitting at 30 degrees with her right side and a decorticate posture in her left-sided decerebrate posture. Patient was tachypneic and tachycardic. She was initially given 0.4 of Narcan with concern for possible drug overdose. Patient was noticed to have rhythmic shaking of all 4 extremities with increased tone. Patient was given a total of 4 of Ativan without any relief. Decision was made to take the patient to CT for a stat scan to rule out brain bleed. In route patient made minor adjustments to her position and was seen scratching her nose. Patient continues to have abnormal posturing and has nonresponsive. The patient has no other acute complaints at this time.           REVIEW OF SYSTEMS   Review of Systems   Unable to perform ROS: Acuity of condition (Patient is unresponsive)         PAST MEDICAL AND SURGICAL HISTORY     Past Medical History:   Diagnosis Date    Acid reflux     Asthma     Cyclic vomiting syndrome      Past Surgical History:   Procedure Laterality Date    ANKLE FRACTURE SURGERY Right 2014    EYE SURGERY      TONSILLECTOMY           MEDICATIONS   No current facility-administered medications for this encounter.     Current Outpatient Medications:     dicyclomine (BENTYL) 10 MG capsule, Take 1 capsule by mouth 4 times daily (before meals and nightly), Disp: 30 capsule, Rfl: 0    ondansetron (ZOFRAN ODT) 4 MG disintegrating tablet, Take 1 tablet by mouth every 8 hours as needed for Nausea or Vomiting, Disp: 20 tablet, Rfl: 0    ibuprofen (IBU) 600 MG tablet, Take 1 tablet by mouth every 6 hours as needed for Pain, Disp: 120 tablet, Rfl: 0    tucks (TUCKS) 50 % PADS, Apply 1 each topically as needed for Hemorrhoids, Disp: 40 each, Rfl: 0    lidocaine viscous hcl (XYLOCAINE) 2 % SOLN solution, Place 15 mLs vaginally as needed for Irritation (apply to vaginal and rectal area), Disp: 100 mL, Rfl: 0    ibuprofen (ADVIL;MOTRIN) 600 MG tablet, Take 1 tablet by mouth every 6 hours as needed for Pain, Disp: 30 tablet, Rfl: 1    acetaminophen (AMINOFEN) 325 MG tablet, Take 2 tablets by mouth every 6 hours as needed for Pain, Disp: 120 tablet, Rfl: 3    Prenatal MV-Min-Fe Fum-FA-DHA (PRENATAL 1 PO), Take by mouth, Disp: , Rfl:     ALBUTEROL SULFATE IN, Inhale into the lungs, Disp: , Rfl:       SOCIAL HISTORY     Social History     Social History Narrative    Not on file     Social History     Tobacco Use    Smoking status: Never Smoker    Smokeless tobacco: Never Used   Vaping Use    Vaping Use: Never used   Substance Use Topics    Alcohol use: Not Currently    Drug use: Never         ALLERGIES     Allergies   Allergen Reactions    Codeine Shortness Of Breath    Suprax [Cefixime] Hives     All over body         FAMILY HISTORY     Family History   Problem Relation Age of Onset    Diabetes Mother     Heart Attack Paternal Grandmother     Heart Attack Paternal Grandfather     Breast Cancer Other         maternal aunt         PREVIOUS RECORDS   Previous records reviewed: Patient last seen emerge from 2022 for vaginal bleeding. PHYSICAL EXAM     ED Triage Vitals [224]   BP Temp Temp Source Pulse Resp SpO2 Height Weight   (!) 148/88 100.7 °F (38.2 °C) Axillary 151 (!) 32 100 % -- 170 lb (77.1 kg)     Initial vital signs and nursing assessment reviewed and normal. Body mass index is 34.34 kg/m². Pulsoximetry is normal per my interpretation. Additional Vital Signs:  Vitals:    22 2356   BP: (!) 116/52   Pulse: 114   Resp: 12   Temp: 99.9 °F (37.7 °C)   SpO2: 99%       Physical Exam  Vitals and nursing note reviewed. Constitutional:       Appearance: She is obese. HENT:      Head: Normocephalic. Comments: Small abrasion to the right forehead. Eyes closed tight, jaw clenched. Both eyes deviated rightward. Mouth/Throat:      Mouth: Mucous membranes are moist.      Pharynx: Oropharynx is clear. Eyes:      General: No scleral icterus. Extraocular Movements:      Right eye: No nystagmus. Left eye: No nystagmus. Pupils: Pupils are equal, round, and reactive to light. Neck:      Thyroid: No thyroid mass. Cardiovascular:      Rate and Rhythm: Regular rhythm. Tachycardia present. Pulses: Normal pulses. Heart sounds: Normal heart sounds. Pulmonary:      Effort: Pulmonary effort is normal. Tachypnea present. Breath sounds: Normal breath sounds. Abdominal:      General: Abdomen is protuberant. Bowel sounds are normal.      Palpations: Abdomen is soft. Tenderness: There is no abdominal tenderness. Musculoskeletal:      Cervical back: Neck supple. No edema or erythema. Right lower le+ Pitting Edema present. Left lower le+ Pitting Edema present. Skin:     General: Skin is warm and dry. Neurological:      General: No focal deficit present. Mental Status: She is oriented to person, place, and time. She is unresponsive.       Comments: Patient overall unresponsive, initially patient had a D cervical posture on the left and a decorticate posture on the right. Patient was initially hypertonic on the right and flaccid on the left patient has since developed bilateral decorticate posture with increased tone in all 4 extremities. Patient continues to be unresponsive. Patient has been noted to adjust herself in the bed and scratch her nose while being observed. Psychiatric:         Behavior: Behavior is uncooperative. MEDICAL DECISION MAKING   Initial Assessment:   1. Patient is a 80-year-old female was brought in after episodes of vomiting and coming unresponsive. Patient with neurological status as above. Blood sugars in the 130s on arrival repeat blood sugar pending. Patient received Narcan, Ativan, Keppra concern for possible seizure-like activity.   Differential diagnosis for this patient includes drug overdose, seizure disorder, status epilepticus, vision disorder, subarachnoid hemorrhage, stroke  Plan:    CT head, chest x-ray   CBC, BMP, troponin, ammonia level, salicylate level, hepatic function panel, urine drug screen, urinalysis, pregnancy test.        ED RESULTS   Laboratory results:  Labs Reviewed   CBC WITH AUTO DIFFERENTIAL - Abnormal; Notable for the following components:       Result Value    WBC 13.2 (*)     RBC 4.16 (*)     MCHC 30.5 (*)     RDW-SD 50.3 (*)     Segs Absolute 11.5 (*)     All other components within normal limits   BASIC METABOLIC PANEL W/ REFLEX TO MG FOR LOW K - Abnormal; Notable for the following components:    CO2 20 (*)     Glucose 125 (*)     All other components within normal limits   HEPATIC FUNCTION PANEL - Abnormal; Notable for the following components:    ALT 6 (*)     All other components within normal limits   URINALYSIS WITH MICROSCOPIC - Abnormal; Notable for the following components:    Ketones, Urine TRACE (*)     Protein, UA 30 (*)     All other components within normal limits   BLOOD GAS, VENOUS - Abnormal; Notable for the following components:    PO2, Mixed 45 (*)     Base Exc, Mixed -2.2 (*)     All other components within normal limits   SALICYLATE LEVEL - Abnormal; Notable for the following components:    Salicylate, Serum < 0.3 (*)     All other components within normal limits   COVID-19, RAPID   CULTURE, BLOOD 1   CULTURE, BLOOD 2   TROPONIN   PREGNANCY, URINE   AMMONIA   URINE DRUG SCREEN   ACETAMINOPHEN LEVEL   ETHANOL   ANION GAP   GLOMERULAR FILTRATION RATE, ESTIMATED   LACTATE, SEPSIS   LACTATE, SEPSIS       Radiologic studies results:  XR CHEST PORTABLE   Final Result   Impression:   No consolidation. This document has been electronically signed by: Marnie Zuniga MD on    05/02/2022 11:13 PM      CT HEAD WO CONTRAST   Final Result   Impression:   No acute intracranial abnormality is identified. This document has been electronically signed by: Marnie Zuniga MD on    05/02/2022 10:54 PM      All CTs at this facility use dose modulation techniques and iterative    reconstructions, and/or weight-based dosing   when appropriate to reduce radiation to a low as reasonably achievable. ED Medications administered this visit:   Medications   naloxone [facility]) injection 0.4 mg (0.4 mg IntraVENous Given 5/2/22 2200)   LORazepam (ATIVAN) injection 2 mg (2 mg IntraVENous Given 5/2/22 2202)   ondansetron (ZOFRAN) injection 4 mg (4 mg IntraVENous Given 5/2/22 2201)   LORazepam (ATIVAN) injection 2 mg (2 mg IntraVENous Given 5/2/22 2209)   levETIRAcetam (KEPPRA) 2,000 mg in sodium chloride 0.9 % 250 mL IVPB (0 mg IntraVENous Stopped 5/2/22 2328)   0.9 % sodium chloride IV bolus 2,313 mL (2,313 mLs IntraVENous New Bag 5/2/22 2331)         ED COURSE     ED Course as of 05/03/22 0055   Tue May 03, 2022   0005 Patient finally responding to pain, awake and crying 2/2 pen-to-nail stimulation. Patient is crying and slurring her speech. Difficult to understand what she is saying.   We will continue monitoring her progress and wait for pending labs. [BENNIE]   0028 CBC with Auto Differential(!):    WBC 13.2(!)   RBC 4.16(!)   Hemoglobin Quant 12.4   Hematocrit 40.7   MCV 97.8   MCH 29.8   MCHC 30.5(!)   RDW-CV 13.9   RDW-SD 50.3(!)   Platelet Count 473   MPV 10.2   Seg Neutrophils 87.0   Lymphocytes 8.4   Monocytes 4.0   Eosinophils 0.0   Basophils 0.2   Immature Granulocytes 0.4   Segs Absolute 11.5(!)   Lymphocytes Absolute 1.1   Monocytes Absolute 0.5   Eosinophils Absolute 0.0   Basophils Absolute 0.0   Immature Grans (Abs) 0.05   Nucleated Red Blood Cells 0  mildly elevated white blood cell count [BENNIE]   0028 Blood gas, venous(!):    PH MIXED 7.34   PCO2, MIXED VENOUS 44   PO2, Mixed 45(!)   HCO3, Mixed 24   Base Exc, Mixed -2.2(!)   O2 Sat, Mixed 78   FIO2, MIXED VENOUS 21   COLLECTED BY: [id number]   DEVICE Room Air  VBG remarkable. [BENNIE]   0028 Troponin:    Troponin T < 0.010  Troponins are negative [BENNIE]   0029 COVID-19, Rapid:    SARS-CoV-2, NAAT NOT  DETECTED  COVID is negative [BENNIE]   0029 Urine Drug Screen:    AMPHETAMINE+METHAMPHETAMINE URINE SCREEN Negative   Barbiturate Quant, Ur Negative   Benzodiazepine Quant, Ur Negative   Cannabinoid Quant, Ur POSITIVE   Cocaine Metab Quant, Ur Negative   Opiates, Urine Negative   Oxycodone Negative   PCP Quant, Ur Negative  Urine drug screen positive only for cannabinoids [BENNIE]   0029 Urinalysis with Microscopic(!):    Glucose, Urine NEGATIVE   Bilirubin, Urine NEGATIVE   Ketones, Urine TRACE(!)   Specific Gravity, UA 1.029   Blood, Urine NEGATIVE   pH, UA 5.5   Protein, UA 30(!)   Urobilinogen, Urine 1.0   Nitrite, Urine NEGATIVE   Leukocyte Esterase, Urine NEGATIVE   Color, UA YELLOW   Character, Urine CLEAR   RBC, UA 0-2   WBC, UA 2-4   Epithelial Cells, UA 0-2   Mucus, UA THREADS   Bacteria, UA NONE SEEN   Casts 0-4 HYALINE   CRYSTALS,XTAL NONE SEEN   Renal Epithelial, UA NONE SEEN   Yeast, Urine NONE SEEN   Casts NONE SEEN   Miscellaneous Lab Test Result NONE SEEN  Urinalysis is unremarkable.  [BENNIE] 0029 XR CHEST PORTABLE  Unremarkable [BENNIE]   0029 CT HEAD WO CONTRAST  Unremarkable [BENNIE]   1960 Salicylate(!):    Salicylate, Serum < 0.3(!)  Unremarkable [BENNIE]   0043 Acetaminophen Level:    Acetaminophen Level < 5.0  Negative [BENNIE]   0043 Hepatic Function Panel(!):    Albumin 4.0   Bilirubin 0.3   Bilirubin, Direct <0.2   Alk Phos 95   AST 14   ALT 6(!)   Total Protein 7.1  Unremarkable [BENNIE]   0043 Ammonia:    AMMONIA, PLASMA, [id number] 20  Unremarkable [BENNIE]   0047 Ethanol:    ETHYL ALCOHOL, SERUM < 0.01  Negative [BENNIE]   0048 Patient still arousable to pain but not arousable to verbal.  Patient's labs are overall unremarkable. Head CT was negative. Patient will need to be ED observation for mental status. Patient this time is protecting her airway and is no concern for need to be intubated. Unclear at this time whether or not this is secondary to Ativan versus psych history. Patient is still tachycardic in the 1 teens, respiratory rate has normalized and patient blood pressure is normal.  Patient has received a total of 0.04 of Narcan, 4 of Ativan, and 2 g of Keppra. Patient care will be taken over by Zay Pond. At this time I anticipate a return to patient's baseline after observation and discharge home with follow-up with primary care. [BENNIE]      ED Course User Index  [BENNIE] Mily Olivas MD             MEDICATION CHANGES     New Prescriptions    No medications on file         FINAL DISPOSITION     Final diagnoses: Altered mental status, unspecified altered mental status type     Condition: condition: stable  Dispo: Patient to ED observation. Patient Care taken over by Dr. Dayne Martin. Anticipate disposition is home with primary care follow-up. This transcription was electronically signed. Parts of this transcriptions may have been dictated by use of voice recognition software and electronically transcribed, and parts may have been transcribed with the assistance of an ED scribe.  The transcription may contain errors not detected in proofreading. Please refer to my supervising physician's documentation if my documentation differs.     Electronically Signed: Teresa Riojas MD, 05/03/22, 12:55 AM         Kala Verma MD  Resident  05/03/22 [id number]

## 2022-05-03 NOTE — CARE COORDINATION
5/3/22, 8:37 AM EDT  DISCHARGE PLANNING EVALUATION:    Lyn Singh       Admitted: 5/2/2022/ 2149   Hospital day: 0   Location: -17/017-A Reason for admit: Seizure-like activity ([facility] [address] [R56.9]  Acute encephalopathy [G93.40]  Altered mental status, unspecified altered mental status type [R41.82]   PMH:  has a past medical history of Acid reflux, Asthma, and Cyclic vomiting syndrome. Procedure:   CT Head WO Contrast    Impression:       Impression:   No acute intracranial abnormality is identified. Barriers to Discharge:  From ED. Telemetry, Neurology consult, IV Fluids, IV Zovirax, Lovenox, IV Keppra, IV Levaquin, IV Vanc, electrolyte replacement protocols. PCP: Dai Galicia, YOVANNY - CNP  Readmission Risk Score: 10.3 ( )%    Patient Goals/Plan/Treatment Preferences: Spoke with Dede Hi's significant other. Kolton shares that [facility] lives at home with him and that [facility] is independent. Discharge plan is unsure pending clinical course. PCP verified. [facility] has no insurance. Will contact Public Benefits. Transportation/Food Security/Housekeeping Addressed:  No issues identified.

## 2022-05-03 NOTE — ED NOTES
Dr. John Jasso at bedside to assess pt. Pt is minimally responsive to pain at this time. VSS. No distress noted.       Carlos Eduardo Wagner RN  05/03/22 3022

## 2022-05-03 NOTE — ED NOTES
Verbal order from Dr. John Reina to administer an ammonia tab. Pt is aroused immediately and briefly communicates with Dr. John Reina.       Abraham Stein RN  05/03/22 [id number]

## 2022-05-03 NOTE — ED NOTES
Patient resting in bed. Respirations easy and unlabored. No distress noted. Call light within reach.        Do Poole RN  05/03/22 [id number]

## 2022-05-03 NOTE — ED PROVIDER NOTES
Transfer of Care Note:   Physician Signing out: Dr Qian Gutiérrez [id number]  Receiving Physician: Marti Jordan MD  Sign out time: [id number]      Brief history:  64-year-old female brought to the ED for unresponsive episode. Apparently was vomiting at lunch and became unresponsive. During ED stay she had intermittent fluctuating logical examinations and mental status. Items pending that need to be checked:  Mental status improvement      Tentative Impression of patient:  1. Altered mental status secondary to behavioral component versus drug use    Expected disposition of patient:  Pending results, discharged. Additional Assessment and results:   I have personally performed a face to face diagnostic evaluation on this patient. The patient's initial evaluation and plan have been discussed with the prior physician who initially evaluated the patient. Nursing Notes, Past Medical Hx, Past Surgical Hx, Social Hx, Allergies, vital signs and Family Hx were all reviewed. Vitals:    05/03/22 0429   BP: 105/62   Pulse: 86   Resp: 18   Temp:    SpO2: 100%     Physical Exam  Constitutional:       Appearance: She is obese. HENT:      Head: Normocephalic. Eyes:      General: No scleral icterus. Right eye: No discharge. Left eye: No discharge. Cardiovascular:      Rate and Rhythm: Normal rate. Pulmonary:      Effort: Pulmonary effort is normal.   Neurological:      Mental Status: She is disoriented. Comments:  Altered mental status           Labs Reviewed   CBC WITH AUTO DIFFERENTIAL - Abnormal; Notable for the following components:       Result Value    WBC 13.2 (*)     RBC 4.16 (*)     MCHC 30.5 (*)     RDW-SD 50.3 (*)     Segs Absolute 11.5 (*)     All other components within normal limits   BASIC METABOLIC PANEL W/ REFLEX TO MG FOR LOW K - Abnormal; Notable for the following components:    CO2 20 (*)     Glucose 125 (*)     All other components within normal limits   HEPATIC FUNCTION PANEL - Abnormal; Notable for the following components:    ALT 6 (*)     All other components within normal limits   URINALYSIS WITH MICROSCOPIC - Abnormal; Notable for the following components:    Ketones, Urine TRACE (*)     Protein, UA 30 (*)     All other components within normal limits   LACTATE, SEPSIS - Abnormal; Notable for the following components:    Lactic Acid, Sepsis 3.0 (*)     All other components within normal limits   BLOOD GAS, VENOUS - Abnormal; Notable for the following components:    PO2, Mixed 45 (*)     Base Exc, Mixed -2.2 (*)     All other components within normal limits   SALICYLATE LEVEL - Abnormal; Notable for the following components:    Salicylate, Serum < 0.3 (*)     All other components within normal limits   COVID-19, RAPID   CULTURE, BLOOD 1   CULTURE, BLOOD 2   TROPONIN   PREGNANCY, URINE   AMMONIA   URINE DRUG SCREEN   ACETAMINOPHEN LEVEL   ETHANOL   ANION GAP   GLOMERULAR FILTRATION RATE, ESTIMATED   LACTATE, SEPSIS         Medications   naloxone (NARCAN) injection 0.4 mg (0.4 mg IntraVENous Given 5/2/22 2200)   LORazepam (ATIVAN) injection 2 mg (2 mg IntraVENous Given 5/2/22 2202)   ondansetron (ZOFRAN) injection 4 mg (4 mg IntraVENous Given 5/2/22 2201)   LORazepam (ATIVAN) injection 2 mg (2 mg IntraVENous Given 5/2/22 2209)   levETIRAcetam (KEPPRA) 2,000 mg in sodium chloride 0.9 % 250 mL IVPB (0 mg IntraVENous Stopped 5/2/22 2328)   0.9 % sodium chloride IV bolus 2,313 mL (0 mL/kg × 77.1 kg IntraVENous Stopped 5/3/22 0110)   ammonia inhaler 0.3 mL (0.3 mLs Inhalation Given 5/3/22 0346)         XR CHEST PORTABLE   Final Result   Impression:   No consolidation. This document has been electronically signed by: Noé Mays MD on    05/02/2022 11:13 PM      CT HEAD WO CONTRAST   Final Result   Impression:   No acute intracranial abnormality is identified.       This document has been electronically signed by: Noé Mays MD on    05/02/2022 10:54 PM      All CTs at this facility use dose modulation techniques and iterative    reconstructions, and/or weight-based dosing   when appropriate to reduce radiation to a low as reasonably achievable. ED Course as of 05/03/22 0450   Tue May 03, 2022   0005 Patient finally responding to pain, awake and crying 2/2 pen-to-nail stimulation. Patient is crying and slurring her speech. Difficult to understand what she is saying. We will continue monitoring her progress and wait for pending labs. [BENNIE]   0028 CBC with Auto Differential(!):    WBC 13.2(!)   RBC 4.16(!)   Hemoglobin Quant 12.4   Hematocrit 40.7   MCV 97.8   MCH 29.8   MCHC 30.5(!)   RDW-CV 13.9   RDW-SD 50.3(!)   Platelet Count 584   MPV 10.2   Seg Neutrophils 87.0   Lymphocytes 8.4   Monocytes 4.0   Eosinophils 0.0   Basophils 0.2   Immature Granulocytes 0.4   Segs Absolute 11.5(!)   Lymphocytes Absolute 1.1   Monocytes Absolute 0.5   Eosinophils Absolute 0.0   Basophils Absolute 0.0   Immature Grans (Abs) 0.05   Nucleated Red Blood Cells 0  mildly elevated white blood cell count [BENNIE]   0028 Blood gas, venous(!):    PH MIXED 7.34   PCO2, MIXED VENOUS 44   PO2, Mixed 45(!)   HCO3, Mixed 24   Base Exc, Mixed -2.2(!)   O2 Sat, Mixed 78   FIO2, MIXED VENOUS 21   COLLECTED BY: [id number]   DEVICE Room Air  VBG remarkable.  [BENNIE]   0028 Troponin:    Troponin T < 0.010  Troponins are negative [BENNIE]   0029 COVID-19, Rapid:    SARS-CoV-2, NAAT NOT  DETECTED  COVID is negative [BENNIE]   0029 Urine Drug Screen:    AMPHETAMINE+METHAMPHETAMINE URINE SCREEN Negative   Barbiturate Quant, Ur Negative   Benzodiazepine Quant, Ur Negative   Cannabinoid Quant, Ur POSITIVE   Cocaine Metab Quant, Ur Negative   Opiates, Urine Negative   Oxycodone Negative   PCP Quant, Ur Negative  Urine drug screen positive only for cannabinoids [BENNIE]   0029 Urinalysis with Microscopic(!):    Glucose, Urine NEGATIVE   Bilirubin, Urine NEGATIVE   Ketones, Urine TRACE(!)   Specific Gravity, UA 1.029   Blood, Urine NEGATIVE   pH, UA 5.5 Protein, UA 30(!)   Urobilinogen, Urine 1.0   Nitrite, Urine NEGATIVE   Leukocyte Esterase, Urine NEGATIVE   Color, UA YELLOW   Character, Urine CLEAR   RBC, UA 0-2   WBC, UA 2-4   Epithelial Cells, UA 0-2   Mucus, UA THREADS   Bacteria, UA NONE SEEN   Casts 0-4 HYALINE   CRYSTALS,XTAL NONE SEEN   Renal Epithelial, UA NONE SEEN   Yeast, Urine NONE SEEN   Casts NONE SEEN   Miscellaneous Lab Test Result NONE SEEN  Urinalysis is unremarkable. [BENNIE]   0029 XR CHEST PORTABLE  Unremarkable [BENNIE]   0029 CT HEAD WO CONTRAST  Unremarkable [BENNIE]   6999 Salicylate(!):    Salicylate, Serum < 0.3(!)  Unremarkable [BENNIE]   0043 Acetaminophen Level:    Acetaminophen Level < 5.0  Negative [BENNIE]   0043 Hepatic Function Panel(!):    Albumin 4.0   Bilirubin 0.3   Bilirubin, Direct <0.2   Alk Phos 95   AST 14   ALT 6(!)   Total Protein 7.1  Unremarkable [BENNIE]   0043 Ammonia:    AMMONIA, PLASMA, [id number]  Unremarkable [BENNIE]   0047 Ethanol:    ETHYL ALCOHOL, SERUM < 0.01  Negative [BENNIE]   0048 Patient still arousable to pain but not arousable to verbal.  Patient's labs are overall unremarkable. Head CT was negative. Patient will need to be ED observation for mental status. Patient this time is protecting her airway and is no concern for need to be intubated. Unclear at this time whether or not this is secondary to Ativan versus psych history. Patient is still tachycardic in the 1 teens, respiratory rate has normalized and patient blood pressure is normal.  Patient has received a total of 0.04 of Narcan, 4 of Ativan, and 2 g of Keppra. Patient care will be taken over by Carol Ann Seals. At this time I anticipate a return to patient's baseline after observation and discharge home with follow-up with primary care. [BENNIE]   0117 CBC shows a WBC of 13. 2Hepatic function panel unremarkableBMP shows CO2 of 20UA shows trace ketones otherwise shows mucus threads. Lactic acid 3. 0COVID-negativeTroponin ammonia salicylate ethanol acetaminophen within normal limitsUrine drug screen positive for cannabinoids [CR]   0118 CT HEAD WO CONTRAST [CR]   0118 XR CHEST PORTABLE [CR]   0118 Initial EKG at 1126 shows sinus tachycardia 150, without signs of acute ischemia [CR]   0124 HR improved to 90s [CR]      ED Course User Index  [CR] Lawrence Figueroa MD  [BENNIE] Fina Swift MD         Further MDM and disposition:   Assessment:   3. 32 yo female presented to ED for unresponsive episode; unclear etiology. Fluctuating mental status and neurological assessment in the ED. Likely secondary to behavioral component in addition to drug use. Plan:    Imaging was unremarkable in the ED. She received Keppra, Ativan, Zofran, Narcan and IV fluids in the ED.  During her ED stay she had mild improvement in her mental status but still concerning so she was admitted. Final diagnoses: Altered mental status, unspecified altered mental status type   Seizure-like activity ([facility] [address]     New Prescriptions    No medications on file         Condition: condition: stable  Dispo: Admit to med/surg floor    This transcription was electronically signed. It was dictated by use of voice recognition software and electronically transcribed. The transcription may contain errors not detected in proofreading.       Lawrence Figueroa MD  Resident  05/03/22 5859

## 2022-05-03 NOTE — PROGRESS NOTES
Progress Note    Patient:  Shraddha Huntley    Unit/Bed:4A-17/017-A  YOB: 1995  MRN: [id number]   Acct: [de-identified]   Admit date: 5/2/2022      Principal Problem:    Acute encephalopathy  Resolved Problems:    * No resolved hospital problems. *          Assessment and Plan:  1. Somnolence, near obtundation, multiple etiologies possible it appears patient on arrival had a witnessed seizure and did receive Ativan and IV Keppra, she has had no acute decompensation overnight she appears to have good perfusion throughout, she underwent EEG that seems to be negative for epileptiform activity, no upper airway rattling and seems to be maintaining her own airway therefore have not opted for intubation for protection of airway, VBG shows adequate pH, at this time advanced radiology cannot perform lumbar puncture the patient is on broad-spectrum antibiotics and acyclovir in the event this is an atypical form of meningitis or encephalitis, lumbar puncture possibly to be performed by critical care services. 2. Dehydration noted on hyaline casts obtained from urinalysis we will continue IV isotonic fluids at 75 mL an hour  3. Urine positive cannabinoid there appears to be documented history of cyclic vomiting syndrome leukocytosis multiple etiologies  4. Including stress vomiting  5. Liver lactic acid possibly from decreased intravascular volume dehydration  6. Addendum : post mri at approximately 1440 hrs patient is arousable and crying, no headache desires to eat as precaution will obtain psychiatry consult. Patient Seen, Chart, Consults notes, Labs, Radiology studies reviewed. Subjective: Patient somnolent, there is some withdrawal to pain intermittently patient does not have any voluntary movements she has had no witnessed seizures on ocular exam she appears to withdraw from gloved finger to the eye, awaiting lumbar puncture no family at bedside.   As per admission team psychiatric consult should her mental status improve    All other ROS negative except noted in HPI    Past, Family, Social History unchanged from admission. Diet:  Diet NPO    Medications:  Scheduled Meds:   sodium chloride flush  5-40 mL IntraVENous 2 times per day    enoxaparin  40 mg SubCUTAneous Daily    levetiracetam  1,000 mg IntraVENous Q12H    acyclovir  800 mg IntraVENous Q8H    levofloxacin  750 mg IntraVENous Q24H    vancomycin  1,250 mg IntraVENous Q12H    vancomycin (VANCOCIN) intermittent dosing (placeholder)   Other RX Placeholder     Continuous Infusions:   sodium chloride      sodium chloride 75 mL/hr at 05/03/22 0629     PRN Meds:sodium chloride flush, sodium chloride, ondansetron **OR** ondansetron, polyethylene glycol, acetaminophen **OR** acetaminophen, potassium chloride **OR** potassium alternative oral replacement **OR** potassium chloride, magnesium sulfate    Objective:  CBC:   Recent Labs     05/02/22 2318   WBC 13.2*   HGB 12.4        BMP:    Recent Labs     05/02/22 2318      K 3.8      CO2 20*   BUN 8   CREATININE 0.5   GLUCOSE 125*     Calcium:  Recent Labs     05/02/22 2318   CALCIUM 8.5     Ionized Calcium:No results for input(s): IONCA in the last 72 hours. Magnesium:No results for input(s): MG in the last 72 hours. Phosphorus:No results for input(s): PHOS in the last 72 hours. BNP:No results for input(s): BNP in the last 72 hours. Glucose:No results for input(s): POCGLU in the last 72 hours. HgbA1C: No results for input(s): LABA1C in the last 72 hours. INR: No results for input(s): INR in the last 72 hours.   Hepatic:   Recent Labs     05/02/22 2318   ALKPHOS 95   ALT 6*   AST 14   PROT 7.1   BILITOT 0.3   BILIDIR <0.2   LABALBU 4.0     Amylase and Lipase:  Recent Labs     05/03/22  0827   LACTA 0.7     Lactic Acid:   Recent Labs     05/03/22  0827   LACTA 0.7     Troponin:   Recent Labs     05/02/22 2318   TROPONINT < 0.010     BNP: No results for input(s): BNP in the last 72 hours. Lipids: No results for input(s): CHOL, TRIG, HDL, LDL, LDLCALC in the last 72 hours. ABGs: No results found for: PH, PCO2, PO2, HCO3, O2SAT        Radiology reports as per the Radiologist  Radiology: [address] [place] [address]    Result Date: 5/2/2022  CT of the head without contrast. No comparison. Findings: No acute hemorrhage or infarct is seen. No masses are identified and there is no hydrocephalus. There is no mass-effect. Impression: No acute intracranial abnormality is identified. This document has been electronically signed by: Veldon Aase, MD on 05/02/2022 10:54 PM All CTs at this facility use dose modulation techniques and iterative reconstructions, and/or weight-based dosing when appropriate to reduce radiation to a low as reasonably achievable. XR CHEST PORTABLE    Result Date: 5/2/2022  Single view of the chest. Findings: Heart size is upper limits of normal. There is no consolidation. No pleural effusions are seen. Impression: No consolidation. This document has been electronically signed by: Veldon Aase, MD on 05/02/2022 11:13 PM        Physical Exam:  Vitals: BP (!) 95/55   Pulse 79   Temp 99 °F (37.2 °C) (Rectal)   Resp 14   Wt 163 lb 9.3 oz (74.2 kg)   SpO2 100%   BMI 33.04 kg/m²   24 hour intake/output:    Intake/Output Summary (Last 24 hours) at 5/3/2022 1338  Last data filed at 5/3/2022 1326  Gross per 24 hour   Intake 0 ml   Output 1275 ml   Net -1275 ml     Last 3 weights: Wt Readings from Last 3 Encounters:   05/03/22 163 lb 9.3 oz (74.2 kg)   04/29/22 170 lb (77.1 kg)   03/25/22 166 lb (75.3 kg)       General appearance -somnolent  HEENT: Atraumatic normocephalic, no JVD. Trachea midline.    Chest - Bilateral air entry, no wheezes, crackles or rhonchi  Cardiovascular - S1S2 RRR, no murmurs or gallops  Abdomen - Soft non tender non distended, normoactive bowel sounds   Neurological -patient's GCS score ranges between 7 and 8 she seems to have good respirations good oxygenation and pulse oximetry, no upper airway rattling ocular exam patient retracts from finger touch  Integumentary - Skin color, texture, turgor normal. No Rashes or lesions, posterior thoracic blanching rash  Musculoskeletal - No clubbing or cyanosis, no deformity  Extremities: No peripheral edema    DVT prophylaxis: [x] Lovenox                                 [] SCDs                                 [] SQ Heparin                                 [] Encourage ambulation           [] Already on Anticoagulation               Electronically signed by Chastity Moy MD on 5/3/2022 at 1:38 PM    Rounding Hospitalist

## 2022-05-03 NOTE — ED NOTES
Pt responding to pain at this time and moaning.  respirations unlabored     Enoc Mcbride RN  05/03/22 7712

## 2022-05-03 NOTE — ED NOTES
0.4 of narcan given at this time per verbal order from Dr. Maria Guadalupe Biswas [id number], RN  05/02/22 6465

## 2022-05-03 NOTE — ED NOTES
Pt dianelys ED from work at [employer] c/o emesis. EMS states that pt then stopped speaking and responding en route. Upon arrival pt covered in spaghetti noodles and sauce. Pt vomited one time. Pt has copious amounts of thick secretions coming out of her mouth. Pt not responding to a sternal rub. Pt movements are wavering from purposeful to non purposeful. Pt maintaining her airway at this time. Providers at bedside to assess. Monitor applied.       Pau Shultz [id number], RN  05/02/22 3435

## 2022-05-03 NOTE — CONSULTS
Neurology Consult Note    ADLN:2/7/2149       GTDZ:[id number]  Patient Ross Jeter     YOB: 1995     Age:27 y.o. Requesting Physician: Norma Allison MD     Reason for Consult:  Evaluate for Altered Mental Status      Chief Complaint: Chun Powers is a 34 yo  F with PMH significant for cyclic vomiting disorder, anxiety, depression, Bipolar disorder on zyprexa and marijuana use who presented to [facility] ED by EMS for an unresponsive episode. Pt was at work and was eating on her lunch break when she vomited once and then became unresponsive. Pt was transported to [facility] by EMS who reported witnessing possible seizure like behavior which was not further described. ED course was significant for fluctuating mental status. Pt was given narcan and 4mg ativan in the ED for concern of seizure vs overdose. Pt observed to have  Pt continued with unresponsiveness but then immediately became conversational with ammonia. On admission pt was unresponsive for hospitalist exam but was later observed by nursing staff to move toes and pull up blanket. On exam pt continued with unresponsiveness to verbal and noxious stimulation. Interval Update- RN called to report that pt had awoken at the time of her return from MRI. She told RN that she remembered staff putting in PICC line. On return visit, pt responsive to verbal stimuli and speaking. Pt stated that she last remembered eating her lunch, no memory of staff placing IVs or PICC, examinations or trying to wake her up. Pt reported at this time that she has a history of psychiatric illness and that she has not seen her psychiatrist, [facility] in months. She stated that she is under a large amount of stress at work. She stated that she was tired but had not other complaints. Pt was alert and oriented at the time of secondary exam with no focal neuro deficit.      Review of Systems   Review of Systems Unable to perform ROS: Mental status change     Medications   Scheduled Meds:    sodium chloride flush  5-40 mL IntraVENous 2 times per day    enoxaparin  40 mg SubCUTAneous Daily    [Held by provider] levetiracetam  1,000 mg IntraVENous Q12H    levofloxacin  750 mg IntraVENous Q24H    vancomycin  1,250 mg IntraVENous Q12H    vancomycin (VANCOCIN) intermittent dosing (placeholder)   Other RX Placeholder     Continuous Infusions:    sodium chloride      sodium chloride 75 mL/hr at 22 0629     PRN Meds: sodium chloride flush, sodium chloride, ondansetron **OR** ondansetron, polyethylene glycol, acetaminophen **OR** acetaminophen, potassium chloride **OR** potassium alternative oral replacement **OR** potassium chloride, magnesium sulfate    Past History    Past Medical History:   has a past medical history of Acid reflux, Asthma, and Cyclic vomiting syndrome. Social History:   reports that she has never smoked. She has never used smokeless tobacco. She reports previous alcohol use. She reports that she does not use drugs. Family History:   Family History   Problem Relation Age of Onset    Diabetes Mother     Heart Attack Paternal Grandmother     Heart Attack Paternal Grandfather     Breast Cancer Other         maternal aunt       Physical Examination      Vitals:  BP (!) 111/58   Pulse 86   Temp 99 °F (37.2 °C) (Rectal)   Resp 14   Wt 163 lb 9.3 oz (74.2 kg)   SpO2 100%   BMI 33.04 kg/m²   Temp (24hrs), Av.3 °F (37.4 °C), Min:98.5 °F (36.9 °C), Max:100.7 °F (38.2 °C)      I/O (24Hr): Intake/Output Summary (Last 24 hours) at 5/3/2022 1721  Last data filed at 5/3/2022 1416  Gross per 24 hour   Intake 0 ml   Output 2275 ml   Net -2275 ml       Physical Exam  Vitals reviewed. Constitutional:       Appearance: She is overweight. Comments: Unresponsive to verbal and tactile stimulation   HENT:      Head: Normocephalic and atraumatic.       Right Ear: External ear normal. Left Ear: External ear normal.      Nose: Nose normal.      Mouth/Throat:      Mouth: Mucous membranes are moist.      Pharynx: Oropharynx is clear. Eyes:      Pupils: Pupils are equal, round, and reactive to light. Cardiovascular:      Rate and Rhythm: Normal rate and regular rhythm. Heart sounds: Normal heart sounds. Pulmonary:      Effort: Pulmonary effort is normal. No respiratory distress. Breath sounds: Normal breath sounds. Abdominal:      General: Abdomen is flat. There is no distension. Palpations: Abdomen is soft. Tenderness: There is no guarding. Musculoskeletal:         General: No deformity or signs of injury. Cervical back: Neck supple. Skin:     General: Skin is warm and dry. Findings: Rash (RUE erythematous, indurated circular lesions) present. Neurological:      Deep Tendon Reflexes:      Reflex Scores:       Bicep reflexes are 2+ on the right side and 2+ on the left side. Brachioradialis reflexes are 2+ on the right side and 2+ on the left side. Patellar reflexes are 1+ on the right side and 1+ on the left side. Psychiatric:      Comments: Unresponsive to verbal, tactile, noxious stimulation       Neurologic Exam     Mental Status   Level of consciousness: unresponsive to painful stimuli  Unresponsive to verbal, tactile, noxious stimulation. Cranial Nerves     CN III, IV, VI   Pupils are equal, round, and reactive to light.   WILMAN further due to AMS     Motor Exam WILMAN due to AMS     Sensory Exam   WILMAN due to AMS     Gait, Coordination, and Reflexes     Reflexes   Right brachioradialis: 2+  Left brachioradialis: 2+  Right biceps: 2+  Left biceps: 2+  Right patellar: 1+  Left patellar: 1+         Labs/Imaging/Diagnostics   Labs:  CBC:  Recent Labs     05/02/22  2318   WBC 13.2*   RBC 4.16*   HGB 12.4   HCT 40.7   MCV 97.8        CHEMISTRIES:  Recent Labs     05/02/22  2318      K 3.8      CO2 20*   BUN 8   CREATININE 0.5 GLUCOSE 125*     PT/INR:No results for input(s): PROTIME, INR in the last 72 hours. APTT:No results for input(s): APTT in the last 72 hours. LIVER PROFILE:  Recent Labs     05/02/22  2318   AST 14   ALT 6*   BILIDIR <0.2   BILITOT 0.3   ALKPHOS 95       Imaging Last 24 Hours:  CT HEAD WO CONTRAST    Result Date: 5/2/2022  CT of the head without contrast. No comparison. Findings: No acute hemorrhage or infarct is seen. No masses are identified and there is no hydrocephalus. There is no mass-effect. Impression: No acute intracranial abnormality is identified. This document has been electronically signed by: Tani Luong MD on 05/02/2022 10:54 PM All CTs at this facility use dose modulation techniques and iterative reconstructions, and/or weight-based dosing when appropriate to reduce radiation to a low as reasonably achievable. MRA HEAD WO CONTRAST    Result Date: 5/3/2022  PROCEDURE: MRA HEAD WO CONTRAST CLINICAL INFORMATION: altered mental status. COMPARISON: Head CT 5/2/2022. TECHNIQUE: 3-D time-of-flight images were obtained through the brain. Multiplanar reconstructions were obtained. No contrast was given. FINDINGS: The distal vertebral arteries are within appropriate limits. The basilar artery is normal. There are normal superior cerebellar arteries and posterior cerebral arteries. The internal carotid arteries are normal. The ophthalmic artery origins are normal. The anterior cerebral arteries are normal. The middle cerebral arteries and their proximal branches are normal. There is a normal anterior communicating artery. There is a normal small left posterior communicating artery. . No aneurysms, stenoses or occlusions are noted. Normal MRA of the brain. **This report has been created using voice recognition software. It may contain minor errors which are inherent in voice recognition technology. ** Final report electronically signed by Dr. Salas Rodriguez on 5/3/2022 2:56 PM    XR CHEST PORTABLE    Result Date: 5/3/2022  PROCEDURE: XR CHEST PORTABLE CLINICAL INFORMATION: PICC placement after adjustment. COMPARISON: Radiographs 05/03/2022 1:45 PM. TECHNIQUE: AP supine view of the chest was obtained. FINDINGS: The PICC line has been slightly adjusted. It is retracted and now the tip is position in the SVC. Interstitial infiltrates are again seen bilaterally. The cardiac silhouette and pulmonary vasculature are within normal limits. There is no significant pleural effusion or pneumothorax. Visualized portions of the upper abdomen are within normal limits. The osseous structures are intact. No acute fractures or suspicious osseous lesions. The PICC line now terminates in the SVC. Otherwise similar appearance of the chest when compared to the prior study. **This report has been created using voice recognition software. It may contain minor errors which are inherent in voice recognition technology. ** Final report electronically signed by Dr Teresa Reynolds on 5/3/2022 4:08 PM    XR CHEST PORTABLE    Result Date: 5/3/2022  PROCEDURE: XR CHEST PORTABLE CLINICAL INFORMATION: 78-year-old female who underwent PICC placement. COMPARISON: Radiograph 05/02/2022. TECHNIQUE: AP supine view of the chest was obtained. FINDINGS: There has been interval placement of a right-sided PICC which terminates in the right atrium. The cardiac silhouette and pulmonary vasculature are within normal limits. There is no significant pleural effusion or pneumothorax. Visualized portions of the upper abdomen are within normal limits. The osseous structures are intact. No acute fractures or suspicious osseous lesions. Interval placement of a right-sided PICC which terminates in the right atrium. **This report has been created using voice recognition software. It may contain minor errors which are inherent in voice recognition technology. ** Final report electronically signed by Dr Teresa Reynolds on 5/3/2022 1:55 PM    XR CHEST PORTABLE    Result Date: 5/2/2022  Single view of the chest. Findings: Heart size is upper limits of normal. There is no consolidation. No pleural effusions are seen. Impression: No consolidation. This document has been electronically signed by: Queen Zandra MD on 05/02/2022 11:13 PM    MRA NECK W WO CONTRAST    Result Date: 5/3/2022  PROCEDURE: MRA NECK W WO CONTRAST CLINICAL INFORMATION: altered mental status. COMPARISON: Brain MRI 5/3/2022. TECHNIQUE: Coronal contrast-enhanced MRA images were obtained through the vessels of the neck. Multiplanar reconstructions were obtained. These include MIP images. 2-D time-of-flight images with associated reconstructions were also obtained. ProHance is the intravenous contrast utilized. FINDINGS: Aortic arch and branches: Aortic arch is normal. The origins of innominate artery, left common carotid artery and origins of both subclavian arteries are normal. Right common carotid artery/ICA: The right common carotid artery is normal. The right carotid bulb and right internal carotid artery are normal. There is no stenosis. Left common carotid artery/ICA: The left common carotid artery is normal. The left carotid bulb and left internal carotid artery are normal. There is no stenosis. Vertebral arteries: The vertebral arteries are normal bilaterally. There is antegrade flow. The right vertebral artery is slightly larger than the left. Intracranial cerebral circulation: There are no gross abnormalities in the visualized aspects of the intracranial vertebral circulation. Coronal and axial source data: There is no cervical adenopathy. Normal MRA of the neck. **This report has been created using voice recognition software. It may contain minor errors which are inherent in voice recognition technology. ** Final report electronically signed by Dr. Misti Hoover on 5/3/2022 3:47 PM    MRI BRAIN W WO CONTRAST    Result Date: 5/3/2022  PROCEDURE: MRI BRAIN W [address] CLINICAL INFORMATIONSeizure, AMS. Altered mental status. COMPARISON: Head CT 5/2/2022. TECHNIQUE: Multiplanar and multiple spin echo T1 and T2-weighted images were obtained through the brain before and after the administration of intravenous contrast. FINDINGS: The diffusion-weighted images are normal. The brain volume is normal.There are no intra-or extra-axial collections. There is no hydrocephalus, midline shift or mass effect. On the FLAIR and T2-weighted sequences, there is normal signal intensity in the brain. On the gradient echo T2-weighted images, there is no susceptibility artifact present. There is no abnormal enhancement in the brain. The major intracranial vascular flow voids are present. The midline craniocervical junction structures are normal.  The brainstem and pituitary gland are normal. There are small areas of mucosal thickening along the floors the maxillary sinuses bilaterally. 1. No acute findings. 2. Normal signal intensity in the brain. **This report has been created using voice recognition software. It may contain minor errors which are inherent in voice recognition technology. ** Final report electronically signed by Dr. Huong Snyder on 5/3/2022 3:44 PM        Assessment and Plan:        Hospital Problems           Last Modified POA    * (Principal) Acute encephalopathy 5/3/2022 Yes          1. Altered Mental Status  · STAT MRI Brain W & WO Contrast- normal MRI of the brain  · STAT MRA H&N- normal vascular imaging  · STAT EEG- normal eeg, no sign of epileptiform activity   · Psych consult pending   · Will hold off on antiepileptic med at this point as pt is back to normal and no signs of seizure on EEG  · Neuro will sign off at this time, please call with any questions or if we can be of further assistance. This patient was seen and evaluated with Dr. Basim Joseph and he is in agreement with the assessment and plan.        Electronically signed by Evaristo Kasper PA-C on 5/3/22 at 5:21 PM ED T    I agree with evaluation and plan. MRI and MRA showed no acute changes. Patinet si most liekly with conversion disorder. We recommend Psych consult.      Desi Chilel MD

## 2022-05-03 NOTE — H&P
Hospitalist - History & Physical      Patient: Helrinda Soriano    Unit/Bed:4A-17/017-A  YOB: 1995  MRN: [id number]   Acct: [de-identified]   PCP: YOVANNY Wallace - DAVID      Assessment and Plan:        1. Altered mental status:   a. Consult neurology for evaluation of new seizure disorder  b. [address] was loaded in the ED  c. Prn Ativan for seizure  d. Concerns for underlying psychiatric condition due to inconsistent behaviors in the ED - will consider involving psychiatry when patient is able to participate in her exam  2. Cyclic vomiting syndrome:   a. Per history   b. Anti-emetics  c. Will not initiate opiates with altered mental status  3. SIRS  a. Blood cultures pending  b. Tachycardia at 152  c. Leukocytosis may be reactive due to vomiting or seizure  d. Lactic acid elevated  e. Procalcitonin pending  f. LP in the setting of altered mental status - culture, cell count, glucose, protein, meningitis molecular panel on CSF  g. Empiric Vancomycin, Levaquin (due to cephalosporin allergy), Valtrex - de-escalate when able      CC:  Unresponsive    HPI: Patient presents to the ED from work. Per EMS history the patient was taking her lunch break and had an episode of vomiting. Per history patient has cyclic vomiting syndrome. The patient became unresponsive for EMS. She was noted to have some \"seizure\" activity with posturing. She was given Ativan. The patient had CT head in the ED to rule out acute hemorrhage or mass. Patient remains unresponsive throughout the visit. Patient is admitted to step down for continued evaluation of acute encephalopathy.       ROS: Review of Systems   Unable to perform ROS: Patient unresponsive     PMH:    Past Medical History:   Diagnosis Date    Acid reflux     Asthma     Cyclic vomiting syndrome      SHX:    Social History     Socioeconomic History    Marital status: Single     Spouse name: Not on file    Number of children: Not on file    Years of education: Not on file    Highest education level: Not on file   Occupational History    Not on file   Tobacco Use    Smoking status: Never Smoker    Smokeless tobacco: Never Used   Vaping Use    Vaping Use: Never used   Substance and Sexual Activity    Alcohol use: Not Currently    Drug use: Never    Sexual activity: Yes     Partners: Male   Other Topics Concern    Not on file   Social History Narrative    Not on file     Social Determinants of Health     Financial Resource Strain:     Difficulty of Paying Living Expenses: Not on file   Food Insecurity:     Worried About Running Out of Food in the Last Year: Not on file    Milad of Food in the Last Year: Not on file   Transportation Needs:     Lack of Transportation (Medical): Not on file    Lack of Transportation (Non-Medical): Not on file   Physical Activity:     Days of Exercise per Week: Not on file    Minutes of Exercise per Session: Not on file   Stress:     Feeling of Stress : Not on file   Social Connections:     Frequency of Communication with Friends and Family: Not on file    Frequency of Social Gatherings with Friends and Family: Not on file    Attends [employer] Services: Not on file    Active Member of [address] [place] [address] or Organizations: Not on file    Attends Club or Organization Meetings: Not on file    Marital Status: Not on file   Intimate Partner Violence:     Fear of Current or Ex-Partner: Not on file    Emotionally Abused: Not on file    Physically Abused: Not on file    Sexually Abused: Not on file   Housing Stability:     Unable to Pay for Housing in the Last Year: Not on file    Number of [place] in the Last Year: Not on file    Unstable Housing in the Last Year: Not on file     FHX:   Family History   Problem Relation Age of Onset    Diabetes Mother     Heart Attack Paternal Grandmother     Heart Attack Paternal Grandfather     Breast Cancer Other         maternal aunt     Allergies:    Allergies   Allergen Reactions    Codeine Shortness Of Breath    Suprax [Cefixime] Hives     All over body     Medications:            Labs:   Recent Results (from the past 24 hour(s))   EKG 12 Lead    Collection Time: 05/02/22  9:51 PM   Result Value Ref Range    Ventricular Rate 152 BPM    Atrial Rate 152 BPM    P-R Interval 118 ms    QRS Duration 76 ms    Q-T Interval 264 ms    QTc Calculation (Bazett) 419 ms    P Axis 68 degrees    R Axis 42 degrees    T Axis 52 degrees   Urinalysis with Microscopic    Collection Time: 05/02/22 10:37 PM   Result Value Ref Range    Glucose, Urine NEGATIVE NEGATIVE mg/dl    Bilirubin Urine NEGATIVE NEGATIVE    Ketones, Urine TRACE (A) NEGATIVE    Specific Gravity, UA 1.029 1.002 - 1.030    Blood, Urine NEGATIVE NEGATIVE    pH, UA 5.5 5.0 - 9.0    Protein, UA 30 (A) NEGATIVE mg/dl    Urobilinogen, Urine 1.0 0.0 - 1.0 eu/dl    Nitrite, Urine NEGATIVE NEGATIVE    Leukocyte Esterase, Urine NEGATIVE NEGATIVE    Color, UA YELLOW YELLOW-STRAW    Character, Urine CLEAR [id number]    RBC, UA 0-2 0-2/hpf /hpf    WBC, UA 2-4 0-4/hpf /hpf    Epithelial Cells, UA 0-2 3-5/hpf /hpf    Mucus, UA THREADS NONE SEEN/THREA    Bacteria, UA NONE SEEN FEW/NONE SEEN    Casts 0-4 HYALINE NONE SEEN /lpf    Crystals NONE SEEN NONE SEEN    Renal Epithelial, UA NONE SEEN NONE SEEN    Yeast, UA NONE SEEN NONE SEEN    Casts NONE SEEN /lpf    Miscellaneous Lab Test Result NONE SEEN    Pregnancy, Urine    Collection Time: 05/02/22 10:37 PM   Result Value Ref Range    Pregnancy, Urine NEGATIVE NEGATIVE   Urine Drug Screen    Collection Time: 05/02/22 10:37 PM   Result Value Ref Range    AMPHETAMINE+METHAMPHETAMINE URINE SCREEN Negative NEGATIVE    Barbiturate Quant, Ur Negative NEGATIVE    Benzodiazepine Quant, Ur Negative NEGATIVE    Cannabinoid Quant, Ur POSITIVE NEGATIVE    Cocaine Metab Quant, Ur Negative NEGATIVE    Opiates, Urine Negative NEGATIVE    Oxycodone Negative NEGATIVE    PCP Quant, Ur Negative NEGATIVE   COVID-19, Ammonia    Collection Time: 05/02/22 11:18 PM   Result Value Ref Range    Ammonia 20 11 - 60 umol/L   Acetaminophen Level    Collection Time: 05/02/22 11:18 PM   Result Value Ref Range    Acetaminophen Level < 5.0 0.0 - 04.0 ug/mL   Salicylate    Collection Time: 05/02/22 11:18 PM   Result Value Ref Range    Salicylate, Serum < 0.3 (L) 2.0 - 10.0 mg/dL   Ethanol    Collection Time: 05/02/22 11:18 PM   Result Value Ref Range    ETHYL ALCOHOL, SERUM < 0.01 0.00 %   Anion Gap    Collection Time: 05/02/22 11:18 PM   Result Value Ref Range    Anion Gap 16.0 8.0 - 16.0 meq/L   Glomerular Filtration Rate, Estimated    Collection Time: 05/02/22 11:18 PM   Result Value Ref Range    Est, Glom Filt Rate >90 ml/min/1.73m2   Blood gas, venous    Collection Time: 05/02/22 11:30 PM   Result Value Ref Range    PH MIXED 7.34 7.31 - 7.41    PCO2, MIXED VENOUS 44 41 - 51 mmhg    PO2, Mixed 45 (H) 25 - 40 mmhg    HCO3, Mixed 24 23 - 28 mmol/l    Base Exc, Mixed -2.2 (L) -2.0 - 3.0 mmol/l    O2 Sat, Mixed 78 %    FIO2, MIXED VENOUS 21     COLLECTED BY: [id number]     DEVICE Room Air          Vital Signs: T: 98.5F P: 86 RR: 18 B/P: 105/62: FiO2: RA: O2 Sat:100%: I/O: No intake or output data in the 24 hours ending 05/03/22 0606      General:   unresponsive  HEENT:  normocephalic and atraumatic. No scleral icterus. PEARLA, mucous membranes moist  Neck: supple. Trachea midline. No JVD. Full ROM, no meningismus. Lungs: clear to auscultation. No retractions, no accessory muscle use. Cardiac: RRR, no murmur, 2+ pulses  Abdomen: soft. Nontender. Bowel sounds active  Extremities:  No clubbing, cyanosis x 4, no edema    Vasculature: capillary refill < 3 seconds. Skin:  warm and dry. no visible rashes  Psych:  Alert and oriented x0. Lymph:  No supraclavicular adenopathy. Neurologic:  CN II-XII grossly intact. No focal deficit.  Does not respond to noxious or painful stimuli    Data: (All radiographs, tracings, PFTs, and imaging are personally viewed and interpreted unless otherwise noted).     EKG: rhythm: sinus tachycardia, tpie=549, ddnw=049 bpm, pr=18 ms, qrs=76 ms, fp=107 ms, axis=68 degrees        Electronically signed by  Saskia Leavitt PA-C

## 2022-05-03 NOTE — PROGRESS NOTES
Patient admitted to [facility] Room 17 from ED and via cart/stretcher  Complaint upon arrival to the room AMS/unresponsiveness  IV's saline locked, flushed and capped; site free of s/s of infection or infiltration. Vital signs obtained. 2 person skin check completed. Unable to complete full assessment or complete the admission due to pt being unresponsive. Will continue to assess.

## 2022-05-03 NOTE — PROGRESS NOTES
Attempted to get consent signed by patient. Patient unable to sign due to condition. Spoke with Hammad RN she states patient only has boyfriend Kolton unable to locate number or contact information for family. Boyfriend Kolton is not POA. Message left for ordering Physician regarding consent. Informed Dr Suzy Mello that we can use 2 Physician signatures for consent.

## 2022-05-03 NOTE — PROGRESS NOTES
Physician Progress Note      Nancy Peterson  CSN #:                  [id number]  :                       1995  ADMIT DATE:       2022 9:49 PM  [facility], [place], operated by [facility] DATE:  RESPONDING  PROVIDER #:        Melissa Martinez          QUERY TEXT:    Pt admitted with AMS. Pt noted to have lactic acid (sepsis) 3.0. If   possible, please respond below and document in the progress notes and   discharge summary if you are evaluating and/or treating any of the following: The medical record reflects the following:  Risk Factors: AMS  Clinical Indicators: lactic acid (sepsis) 3.0  Treatment: IV ATBs, IVF, labs    Thank you! Caty Bird, RN, BSN, RHIT, CCDS  Clinical   Options provided:  -- Lactic Acidosis  -- Clinically insignificant elevated lactic acid  -- Other - I will add my own diagnosis  -- Disagree - Not applicable / Not valid  -- Disagree - Clinically unable to determine / Unknown  -- Refer to Clinical Documentation Reviewer    PROVIDER RESPONSE TEXT:    This patient has lactic acidosis.     Query created by: Cleve Olivares on 5/3/2022 9:28 AM      Electronically signed by:  Melissa Martinez 5/3/2022 9:31 AM

## 2022-05-03 NOTE — PROCEDURES
EEG REPORT       Patient: Alice Price Age: 33 y.o. MRN: [id number]      Referring Provider: No ref. provider found    History: This routine 30 minute scalp EEG was recorded with video- monitoring for a 33 y.o. Martín Kunz female  who presented with encephalopathy. This EEG was performed to evaluate for focal and epileptiform abnormalities.      Alice Price   Current Facility-Administered Medications   Medication Dose Route Frequency Provider Last Rate Last Admin    sodium chloride flush 0.9 % injection 5-40 mL  5-40 mL IntraVENous 2 times per day [employer], PA-C   10 mL at 05/03/22 0900    sodium chloride flush 0.9 % injection 5-40 mL  5-40 mL IntraVENous PRN [employer], PA-C        0.9 % sodium chloride infusion   IntraVENous PRN [employer], PA-C        enoxaparin (LOVENOX) injection 40 mg  40 mg SubCUTAneous Daily [employer], PA-C        ondansetron (ZOFRAN-ODT) disintegrating tablet 4 mg  4 mg Oral Q8H PRN [employer], PA-C        Or    ondansetron (ZOFRAN) injection 4 mg  4 mg IntraVENous Q6H PRN Elizabeth R Noemi Hargrove, PA-C        polyethylene glycol (GLYCOLAX) packet 17 g  17 g Oral Daily PRN [employer], PA-C        acetaminophen (TYLENOL) tablet 650 mg  650 mg Oral Q6H PRN [employer], PA-C        Or    acetaminophen (TYLENOL) suppository 650 mg  650 mg Rectal Q6H PRN [employer], PA-C        0.9 % sodium chloride infusion   IntraVENous Continuous [employer], PA-C 75 mL/hr at 05/03/22 0629 New Bag at 05/03/22 0629    potassium chloride (KLOR-CON M) extended release tablet 40 mEq  40 mEq Oral PRN [employer], PA-C        Or    potassium bicarb-citric acid (EFFER-K) effervescent tablet 40 mEq  40 mEq Oral PRN [employer], PA-C        Or    potassium chloride 10 mEq/100 mL IVPB (Peripheral Line)  10 mEq IntraVENous PRN [employer], PA-C        magnesium sulfate 2000 mg in 50 mL IVPB premix  2,000 mg IntraVENous PRN Elizabeth R MISAEL Perez-ANTWAN        levETIRAcetam (KEPPRA) 1,000 mg in sodium chloride 0.9 % 100 mL IVPB  1,000 mg IntraVENous Q12H Dean Christian PA-C   Stopped at 05/03/22 0913    acyclovir (ZOVIRAX) 800 mg in dextrose 5 % 250 mL IVPB  800 mg IntraVENous Q8H Elizabeth Perez PA-C   Stopped at 05/03/22 1020    levoFLOXacin (LEVAQUIN) 750 MG/150ML infusion 750 mg  750 mg IntraVENous Q24H Eliasgagan Christian PA-C   Stopped at 05/03/22 1059    vancomycin (VANCOCIN) 1250 mg in dextrose 5 % 250 mL IVPB  1,250 mg IntraVENous Q12H Dean ZANE Christian   Stopped at 05/03/22 1151    vancomycin (VANCOCIN) intermittent dosing (placeholder)   Other RX Placeholder Elizabeth Horvath PA-C            Technical Description: This is a 21 channel digital EEG recording with time-locked video. Electrodes were placed in accordance with the 10-20 International System of Electrode Placement. Single lead EKG monitoring as well as temporal electrodes were included. The patient was not sleep deprived. This recording was obtained during wakefulness. EEG Description: The dominant background activity during maximal recorded wakefulness consisted of bioccipitally dominant 9-10 Hz, 25-35 uV symmetric, regular activity that was reactive to eye opening. During drowsiness, the background rhythm waxed and waned and there were periods of slowing. During stage II sleep symmetric V waves, K complexes, and sleep spindles were seen. Photic stimulation - stepwise photic stimulation at 2-30 Hz was performed and there was a biposterior, symmetric, driving response. Hyperventilation - was not preformed. No abnormalities were activated by photic stimulation     The EKG channel demonstrated a normal sinus rhythm. Interpretation  This EEG was normal in wakefulness and sleep. Clinical correlation  This EEG was normal. No focal or epileptiform abnormalities were seen.     Aniket Stewart MD  Diplomate, American Board of Psychiatry and Neurology  Diplomate, American Board of Clinical Neurophysiology  Diplomate, [address] [place] [address] Board of Epilepsy

## 2022-05-03 NOTE — PLAN OF CARE
Problem: Discharge Planning  Goal: Discharge to home or other facility with appropriate resources  Outcome: Progressing  Flowsheets (Taken 5/3/2022 1441)  Discharge to home or other facility with appropriate resources: Arrange for needed discharge resources and transportation as appropriate     Problem: Safety - Adult  Goal: Free from fall injury  Outcome: Progressing  Note: Pt free from injury this shift. Problem: ABCDS Injury Assessment  Goal: Absence of physical injury  Outcome: Progressing  Note: Pt free from injury. Problem: Skin/Tissue Integrity  Goal: Absence of new skin breakdown  Description: 1. Monitor for areas of redness and/or skin breakdown  2. Assess vascular access sites hourly  3. Every 4-6 hours minimum:  Change oxygen saturation probe site  4. Every 4-6 hours:  If on nasal continuous positive airway pressure, respiratory therapy assess nares and determine need for appliance change or resting period. Outcome: Progressing  Note: No signs of new skin breakdown with each assessment. Skin remains warm, dry, intact. Mucous membranes pink & moist. Turning patient every two hours and as needed. Patient is unable to participate in care planning and goal setting. Family has been updated and voices understanding.

## 2022-05-03 NOTE — FLOWSHEET NOTE
05/03/22 1549   Treatment Team Notification   Reason for Communication Evaluate   Team Member Name Dr. Ayana Coombs Provider   Method of Communication Secure Message  (psych consult)   Response Waiting for response   Notification Time      Psych consult completed to Dr. Josh Ayala

## 2022-05-03 NOTE — ED NOTES
Pt resting in bed with eyes closed. VSS. No distress noted. Respires even and unlabored. WIll continue to monitor.       Wyatt Sierra RN  05/03/22 6466

## 2022-05-03 NOTE — CARE COORDINATION
05/03/22 1327   Service Assessment   Patient Orientation Unresponsive   History Provided By Significant Other   Primary Caregiver Self   Support Systems Spouse/Significant Other   PCP Verified by CM Yes   Prior Functional Level Independent in ADLs/IADLs   Can patient return to prior living arrangement Unknown at present   Ability to make needs known: Unable   Financial Resources Other (Comment)  (Public Benefits contacted)   Social/Functional History   Lives With Significant other   ADL Assistance Independent   Homemaking Assistance Independent   Ambulation Assistance Independent   Transfer Assistance Independent   Active  No   Patient's  Info has transportation   Mode of Transportation Car   Condition of Participation: Discharge [address] for Transition of Care is related to the following treatment goals: medical stability

## 2022-05-03 NOTE — PROGRESS NOTES
PICC Procedure Note    Derik Nesbitt   Admitted- 5/2/2022  9:49 PM  Admission diagnosis- Seizure-like activity ([facility] [address] [R56.9]  Acute encephalopathy [G93.40]  Altered mental status, unspecified altered mental status type [R41.82]      Attending Physician- Kristy Camara MD  Ordering Physician-same  Indication for Insertion: Poor Vascular Access    Catheter Insertion Date- 5/3/2022   Lot Number- [id number]  Gauge-5  Lumen-triple    Insertion Site- BETY Brachial  Vein Diameter- 1.22 cm  Catheter Length- 35 cm  Internal Length- 33 cm  Exposed Catheter Length- 2cm   Upper Arm Circumference- 38cm  Tip Confirmation System Bundle met- No: chest xray needed  If X-ray required, Tip Location- [place]  Radiologist- Dr Mila Sutton    PICC insertion successful- Yes  Ultrasound- yes    Okay To Use PICC- Yes    Electronically signed by Dagoberto Gorman, RN, RN on 5/3/2022 at 4:18 PM

## 2022-05-03 NOTE — PROGRESS NOTES
[facility] Laboratory Technician Worksheet      EEG Date: 5/3/2022    Name: Toño Huerta   : 1995   Age: 33 y.o. SEX: female    [facility] [address] MRN: [id number]           CSN: [id number]      Ordering Provider: Camille  EEG Number: [id number] Time of Test:  1125    Hand: UNKNOWN   Sedation: no    H.V. Done: No NOT DONE Photic: Yes    Sleep: Yes  Drowsy: No   Sleep Deprived: No    Seizures observed: no, HEAD TREMORS,    Mentality: SOMNOLENT    Clinical History:  ADMIT FOR ALTERED MENTAL STATUS,  HX OF CYCLIC VOMITING SYNDROME, UNRESPONSIVE FOR ems, SEIZURE ACTIVITY WITH POSTURING,  MRI PENDING  CT    Impression:   No acute intracranial abnormality is identified.          Past Medical History:       Diagnosis Date    Acid reflux     Asthma     Cyclic vomiting syndrome        Scheduled Meds:   sodium chloride flush  5-40 mL IntraVENous 2 times per day    enoxaparin  40 mg SubCUTAneous Daily    levetiracetam  1,000 mg IntraVENous Q12H    acyclovir  800 mg IntraVENous Q8H    levofloxacin  750 mg IntraVENous Q24H    vancomycin  1,250 mg IntraVENous Q12H    vancomycin (VANCOCIN) intermittent dosing (placeholder)   Other RX Placeholder     Continuous Infusions:   sodium chloride      sodium chloride 75 mL/hr at 22 0629     PRN Meds:.sodium chloride flush, sodium chloride, ondansetron **OR** ondansetron, polyethylene glycol, acetaminophen **OR** acetaminophen, potassium chloride **OR** potassium alternative oral replacement **OR** potassium chloride, magnesium sulfate    Technician: Alphonse Canela 5/3/2022

## 2022-05-04 LAB
ANION GAP SERPL CALCULATED.3IONS-SCNC: 9 MEQ/L (ref 8–16)
BASOPHILS # BLD: 0.4 %
BASOPHILS ABSOLUTE: 0 THOU/MM3 (ref 0–0.1)
BUN BLDV-MCNC: 6 MG/DL (ref 7–22)
C-REACTIVE PROTEIN: 1.07 MG/DL (ref 0–1)
CALCIUM SERPL-MCNC: 8.2 MG/DL (ref 8.5–10.5)
CHLORIDE BLD-SCNC: 107 MEQ/L (ref 98–111)
CO2: 24 MEQ/L (ref 23–33)
CORTISOL: 9.95 UG/DL
CREAT SERPL-MCNC: 0.5 MG/DL (ref 0.4–1.2)
EOSINOPHIL # BLD: 1.3 %
EOSINOPHILS ABSOLUTE: 0.1 THOU/MM3 (ref 0–0.4)
ERYTHROCYTE [DISTWIDTH] IN BLOOD BY AUTOMATED COUNT: 14 % (ref 11.5–14.5)
ERYTHROCYTE [DISTWIDTH] IN BLOOD BY AUTOMATED COUNT: 50 FL (ref 35–45)
GFR SERPL CREATININE-BSD FRML MDRD: > 90 ML/MIN/1.73M2
GLUCOSE BLD-MCNC: 97 MG/DL (ref 70–108)
HCT VFR BLD CALC: 36.5 % (ref 37–47)
HEMOGLOBIN: 11.3 GM/DL (ref 12–16)
IMMATURE GRANS (ABS): 0.01 THOU/MM3 (ref 0–0.07)
IMMATURE GRANULOCYTES: 0.1 %
LIPASE: 29.8 U/L (ref 5.6–51.3)
LYMPHOCYTES # BLD: 37.4 %
LYMPHOCYTES ABSOLUTE: 2.8 THOU/MM3 (ref 1–4.8)
MAGNESIUM: 2 MG/DL (ref 1.6–2.4)
MCH RBC QN AUTO: 29.8 PG (ref 26–33)
MCHC RBC AUTO-ENTMCNC: 31 GM/DL (ref 32.2–35.5)
MCV RBC AUTO: 96.3 FL (ref 81–99)
MONOCYTES # BLD: 8 %
MONOCYTES ABSOLUTE: 0.6 THOU/MM3 (ref 0.4–1.3)
MRSA SCREEN RT-PCR: NEGATIVE
NUCLEATED RED BLOOD CELLS: 0 /100 WBC
PLATELET # BLD: 284 THOU/MM3 (ref 130–400)
PMV BLD AUTO: 9.9 FL (ref 9.4–12.4)
POTASSIUM REFLEX MAGNESIUM: 3.4 MEQ/L (ref 3.5–5.2)
RBC # BLD: 3.79 MILL/MM3 (ref 4.2–5.4)
SEDIMENTATION RATE, ERYTHROCYTE: 21 MM/HR (ref 0–20)
SEG NEUTROPHILS: 52.8 %
SEGMENTED NEUTROPHILS ABSOLUTE COUNT: 4 THOU/MM3 (ref 1.8–7.7)
SODIUM BLD-SCNC: 140 MEQ/L (ref 135–145)
TSH SERPL DL<=0.05 MIU/L-ACNC: 1.69 UIU/ML (ref 0.4–4.2)
WBC # BLD: 7.5 THOU/MM3 (ref 4.8–10.8)

## 2022-05-04 PROCEDURE — 36592 COLLECT BLOOD FROM PICC: CPT

## 2022-05-04 PROCEDURE — 82533 TOTAL CORTISOL: CPT

## 2022-05-04 PROCEDURE — 87641 MR-STAPH DNA AMP PROBE: CPT

## 2022-05-04 PROCEDURE — 6360000002 HC RX W HCPCS: Performed by: PHYSICIAN ASSISTANT

## 2022-05-04 PROCEDURE — 83690 ASSAY OF LIPASE: CPT

## 2022-05-04 PROCEDURE — 94760 N-INVAS EAR/PLS OXIMETRY 1: CPT

## 2022-05-04 PROCEDURE — 2580000003 HC RX 258: Performed by: PHYSICIAN ASSISTANT

## 2022-05-04 PROCEDURE — 80048 BASIC METABOLIC PNL TOTAL CA: CPT

## 2022-05-04 PROCEDURE — 84443 ASSAY THYROID STIM HORMONE: CPT

## 2022-05-04 PROCEDURE — 83735 ASSAY OF MAGNESIUM: CPT

## 2022-05-04 PROCEDURE — 6370000000 HC RX 637 (ALT 250 FOR IP): Performed by: PSYCHIATRY & NEUROLOGY

## 2022-05-04 PROCEDURE — 85651 RBC SED RATE NONAUTOMATED: CPT

## 2022-05-04 PROCEDURE — 6370000000 HC RX 637 (ALT 250 FOR IP): Performed by: PHYSICIAN ASSISTANT

## 2022-05-04 PROCEDURE — 99232 SBSQ HOSP IP/OBS MODERATE 35: CPT | Performed by: INTERNAL MEDICINE

## 2022-05-04 PROCEDURE — 86140 C-REACTIVE PROTEIN: CPT

## 2022-05-04 PROCEDURE — 85025 COMPLETE CBC W/AUTO DIFF WBC: CPT

## 2022-05-04 PROCEDURE — 2060000000 HC ICU INTERMEDIATE R&B

## 2022-05-04 RX ORDER — CITALOPRAM 20 MG/1
20 TABLET ORAL DAILY
Status: DISCONTINUED | OUTPATIENT
Start: 2022-05-05 | End: 2022-05-06 | Stop reason: HOSPADM

## 2022-05-04 RX ORDER — HYDROXYZINE HYDROCHLORIDE 25 MG/1
50 TABLET, FILM COATED ORAL 3 TIMES DAILY PRN
Status: DISCONTINUED | OUTPATIENT
Start: 2022-05-04 | End: 2022-05-06 | Stop reason: HOSPADM

## 2022-05-04 RX ADMIN — LEVOFLOXACIN 750 MG: 750 INJECTION, SOLUTION INTRAVENOUS at 06:08

## 2022-05-04 RX ADMIN — Medication 1250 MG: at 09:04

## 2022-05-04 RX ADMIN — SODIUM CHLORIDE: 9 INJECTION, SOLUTION INTRAVENOUS at 19:58

## 2022-05-04 RX ADMIN — SODIUM CHLORIDE: 9 INJECTION, SOLUTION INTRAVENOUS at 01:42

## 2022-05-04 RX ADMIN — HYDROXYZINE HYDROCHLORIDE 50 MG: 25 TABLET, FILM COATED ORAL at 23:32

## 2022-05-04 RX ADMIN — SODIUM CHLORIDE, PRESERVATIVE FREE 10 ML: 5 INJECTION INTRAVENOUS at 09:05

## 2022-05-04 RX ADMIN — POTASSIUM BICARBONATE 40 MEQ: 782 TABLET, EFFERVESCENT ORAL at 06:12

## 2022-05-04 ASSESSMENT — PAIN SCALES - GENERAL: PAINLEVEL_OUTOF10: 0

## 2022-05-04 NOTE — PLAN OF CARE
Problem: Discharge Planning  Goal: Discharge to home or other facility with appropriate resources  5/4/2022 1036 by Cristóbal Coto RN  Outcome: Progressing  Note: Patient educated that discharge plan is still in progress. Patient from home with family. Problem: Safety - Adult  Goal: Free from fall injury  5/4/2022 1036 by Cristóbal Coto RN  Outcome: Progressing  Note: Patient remains free from falls this shift. Fall precautions in place with bed/chair exit alarmed. Fall sign posted and fall armband in place. Nonskid footwear used with transferring. Educated patient to use call light when in need of staff assistance with transferring, ambulating, and other activities of daily living. Patient appropriately uses call light this shift. Problem: ABCDS Injury Assessment  Goal: Absence of physical injury  5/4/2022 1036 by Cristóbal Coto RN  Outcome: Progressing     Problem: Skin/Tissue Integrity  Goal: Absence of new skin breakdown  Description: 1. Monitor for areas of redness and/or skin breakdown  2. Assess vascular access sites hourly  3. Every 4-6 hours minimum:  Change oxygen saturation probe site  4. Every 4-6 hours:  If on nasal continuous positive airway pressure, respiratory therapy assess nares and determine need for appliance change or resting period. 5/4/2022 1036 by Cristóbal Coto RN  Outcome: Progressing  Note: Patient exhibits no new skin breakdown this shift. Patient repositioned Q2H and as needed with staff assistance. All skin integrity issuse charted in Flowsheets. Will continue to monitor. Problem: Pain  Goal: Verbalizes/displays adequate comfort level or baseline comfort level  5/4/2022 1036 by Cristóbal Coto RN  Outcome: Progressing  Note: Patient reports pain as a 0 on a 0-10 scale this shift. Patient's stated pain goal is no pain. Care plan reviewed with patient and family. Patient and family verbalize understanding of the plan of care and contribute to goal setting.

## 2022-05-04 NOTE — CARE COORDINATION
5/4/22, 1:31 PM EDT    DISCHARGE ON [address] [facility] day: 1  Location: [facility]17/017- Reason for admit: Seizure-like activity ([facility] [address] [R56.9]  Acute encephalopathy [G93.40]  Altered mental status, unspecified altered mental status type [R41.82]   Procedure:   MRI Brain W WO Contrast    Impression:           1. No acute findings. 2. Normal signal intensity in the brain. EEG normal    5/3 PICC line placement    Barriers to Discharge: Psychiatry consult, PT/OT, IV Levaquin, IV Keppra, IV Vancomycin, electrolyte replacement protocols. PCP: YOVANNY Fischer CNP  Readmission Risk Score: 10.8 ( )%  Patient Goals/Plan/Treatment Preferences: Plan is to return home with SO. Will follow for needs.

## 2022-05-04 NOTE — PROGRESS NOTES
Hospitalist Progress Note    Patient:  Ana Lilia Avila    YOB: 1995  Unit/Bed:4A-17/017-A  MRN: [id number]    Acct: [de-identified]   PCP: YOVANNY Banerjee CNP    Date of Admission: 5/2/2022      Assessment/Plan:  Radha Spilove Episode: Unclear etiology, no prodromal symptoms reported. CT head and MRI of the brain without any acute or chronic findings. EEG within normal limits. ABG unremarkable. Neurology consulted and have signed off. Patient reports many stressors recently, thinks she may have had a miscarriage 1 week ago despite negative beta hCG. Psych was consulted on 5/3 for possible conversion disorder. o Pending psych evaluation  o No further episodes, continue seizure precautions  o No indication for AED at this time. Continue holding.  Dizziness: No focal neuro deficits on exam, MRI negative. No nystagmus, however could consider BPPV versus orthostasis. Orthostatics in the morning. PT/OT and vestibular eval if warranted.  Low-grade fevers: With lactic acidosis and WBC elevation on arrival -however these could have both been related to volume depletion, both resolved. Unclear source given lack of good history. UA clear, low suspicion for bacterial meningitis based on presentation. Seems to be getting better, consideration for ear infection (patient reports history of) versus aspiration pneumonitis/pneumonia (during unresponsive episode). o CXR shows increased bilateral non-specific infiltrates, worsened since arrival.   o MRSA PCR negative. Continue Levaquin monotherapy   Lactic acidosis Mild: Resolved, likely secondary to volume depletion improved with fluids.  Mild hypokalemia: Normal mag, replacement protocol. Repeat BMP in the morning   Low normal BP: seems to run on lower side. Will check AM cortisol. Getting IVF.  CBD positive / Hx of Cyclical vomiting syndrome. Not currently nausea. Counseling.            Expected discharge date:  ?1d    Disposition: pending course  [] Home  [] TCU  [] Rehab  [] Psych  [] SNF  [] [facility]  [] Other-    ===================================================================      Chief Complaint: unresponsive episode    Hospital Course: Per HPI, \"Patient presents to the ED from work. Per EMS history the patient was taking her lunch break and had an episode of vomiting. Per history patient has cyclic vomiting syndrome. The patient became unresponsive for EMS. She was noted to have some \"seizure\" activity with posturing. She was given Ativan. The patient had CT head in the ED to rule out acute hemorrhage or mass. Patient remains unresponsive throughout the visit. Patient is admitted to step down for continued evaluation of acute encephalopathy. \"    Subjective (past 24 hours): Patient seen at bedside, she reports she is still feeling dizzy and weak overall, however improved. She is unable to provide much history from recent events, but per discussion with nursing she was not very responsive yesterday and is answering questions and oriented today. She denies any nausea or vomiting.       Medications:  Reviewed    Infusion Medications    sodium chloride      sodium chloride 75 mL/hr at 05/04/22 0142     Scheduled Medications    sodium chloride flush  5-40 mL IntraVENous 2 times per day    enoxaparin  40 mg SubCUTAneous Daily    [Held by provider] levetiracetam  1,000 mg IntraVENous Q12H    levofloxacin  750 mg IntraVENous Q24H    vancomycin  1,250 mg IntraVENous Q12H    vancomycin (VANCOCIN) intermittent dosing (placeholder)   Other RX Placeholder     PRN Meds: sodium chloride flush, sodium chloride, ondansetron **OR** ondansetron, polyethylene glycol, acetaminophen **OR** acetaminophen, potassium chloride **OR** potassium alternative oral replacement **OR** potassium chloride, magnesium sulfate      ROS: reviewed from prior note, full ROS unchanged unless otherwise stated in hospital course/subjective portion. Intake/Output Summary (Last 24 hours) at 5/4/2022 0816  Last data filed at 5/4/2022 0310  Gross per 24 hour   Intake 1125 ml   Output 2000 ml   Net -875 ml       Exam:  BP 94/70   Pulse 102   Temp 98.5 °F (36.9 °C) (Oral)   Resp 16   Wt 158 lb 15.2 oz (72.1 kg)   SpO2 96%   BMI 32.10 kg/m²     General appearance: Overweight, appears slightly drowsy  Eyes:  PERRL. Conjunctivae/corneas clear. HENT: Head normal appearing. Nares normal. Oral mucosa moist.  Hearing intact. Neck: Supple, with full range of motion. Trachea midline. No gross JVD appreciated. Respiratory:  Normal effort. Clear to auscultation, without rales or wheezes or rhonchi. Cardiovascular: Normal rate, regular rhythm with normal S1/S2 without murmurs. No lower extremity edema. Abdomen: Soft, non-tender, non-distended with normal bowel sounds. Musculoskeletal: No joint swelling or tenderness. Normal tone. No abnormal movements. Skin: Warm and dry. No rashes or lesions. Neurologic:  No focal sensory/motor deficits in the upper or lower extremities. Cranial nerves:  grossly non-focal 2-12. Patient cannot deviate eyes past left midline, reports this is chronic from prior injury. Psychiatric: Alert and oriented. Slow to speak, impaired insight. Capillary Refill: Brisk,< 3 seconds. Peripheral Pulses: +2 palpable, equal bilaterally. Labs:   Recent Labs     05/02/22 2318 05/04/22  0500   WBC 13.2* 7.5   HGB 12.4 11.3*   HCT 40.7 36.5*    284     Recent Labs     05/02/22 2318 05/04/22  0500    140   K 3.8 3.4*    107   CO2 20* 24   BUN 8 6*   CREATININE 0.5 0.5   CALCIUM 8.5 8.2*     Recent Labs     05/02/22 2318   AST 14   ALT 6*   BILIDIR <0.2   BILITOT 0.3   ALKPHOS 95     No results for input(s): INR in the last 72 hours. No results for input(s): [place] in the last 72 hours.   Recent Labs     05/03/22  0827   PROCAL 0.11*      Lab Results   Component Value Date    NITRU NEGATIVE 05/02/2022    WBCUA 2-4 05/02/2022    BACTERIA NONE SEEN 05/02/2022    RBCUA 0-2 05/02/2022    BLOODU NEGATIVE 05/02/2022    SPECGRAV 1.029 05/02/2022    GLUCOSEU NEGATIVE 08/19/2021       Radiology (48 hours):  CT HEAD WO CONTRAST    Result Date: 5/2/2022  Impression: No acute intracranial abnormality is identified. This document has been electronically signed by: Kaya Oliver MD on 05/02/2022 10:54 PM All CTs at this facility use dose modulation techniques and iterative reconstructions, and/or weight-based dosing when appropriate to reduce radiation to a low as reasonably achievable. MRA HEAD WO CONTRAST    Result Date: 5/3/2022   Normal MRA of the brain. **This report has been created using voice recognition software. It may contain minor errors which are inherent in voice recognition technology. ** Final report electronically signed by Dr. Jeri Tate on 5/3/2022 2:56 PM    XR CHEST PORTABLE    Result Date: 5/3/2022  The PICC line now terminates in the SVC. Otherwise similar appearance of the chest when compared to the prior study. **This report has been created using voice recognition software. It may contain minor errors which are inherent in voice recognition technology. ** Final report electronically signed by Dr Rea Bowser on 5/3/2022 4:08 PM    XR CHEST PORTABLE    Result Date: 5/3/2022  Interval placement of a right-sided PICC which terminates in the right atrium. **This report has been created using voice recognition software. It may contain minor errors which are inherent in voice recognition technology. ** Final report electronically signed by Dr Rea Bowser on 5/3/2022 1:55 PM    XR CHEST PORTABLE    Result Date: 5/2/2022  Impression: No consolidation. This document has been electronically signed by: Kaya Oliver MD on 05/02/2022 11:13 PM    MRA NECK W WO CONTRAST    Result Date: 5/3/2022   Normal MRA of the neck. **This report has been created using voice recognition software.  It may contain minor errors which are inherent in voice recognition technology. ** Final report electronically signed by Dr. Mirza Chiang on 5/3/2022 3:47 PM    MRI BRAIN W WO CONTRAST    Result Date: 5/3/2022   1. No acute findings. 2. Normal signal intensity in the brain. **This report has been created using voice recognition software. It may contain minor errors which are inherent in voice recognition technology. ** Final report electronically signed by Dr. Mirza Chiang on 5/3/2022 3:44 PM       DVT prophylaxis:    [x] Lovenox  [] SCDs  [] SQ Heparin  [] Encourage ambulation   [] Already on Anticoagulation       Diet: ADULT DIET;  Regular  Code Status: Full Code  PT/OT: yes  Tele: yes  IVF: yes    Electronically signed by Geoff Patton DO on 5/4/2022 at 8:16 AM

## 2022-05-05 LAB
ANION GAP SERPL CALCULATED.3IONS-SCNC: 10 MEQ/L (ref 8–16)
BUN BLDV-MCNC: 8 MG/DL (ref 7–22)
CALCIUM SERPL-MCNC: 8.4 MG/DL (ref 8.5–10.5)
CHLORIDE BLD-SCNC: 108 MEQ/L (ref 98–111)
CO2: 22 MEQ/L (ref 23–33)
CREAT SERPL-MCNC: 0.4 MG/DL (ref 0.4–1.2)
GFR SERPL CREATININE-BSD FRML MDRD: > 90 ML/MIN/1.73M2
GLUCOSE BLD-MCNC: 86 MG/DL (ref 70–108)
INFLUENZA A: NOT DETECTED
INFLUENZA B: NOT DETECTED
MAGNESIUM: 1.8 MG/DL (ref 1.6–2.4)
POTASSIUM REFLEX MAGNESIUM: 3.4 MEQ/L (ref 3.5–5.2)
RHEUMATOID FACTOR: < 10 IU/ML (ref 0–13)
SARS-COV-2 RNA, RT PCR: NOT DETECTED
SODIUM BLD-SCNC: 140 MEQ/L (ref 135–145)

## 2022-05-05 PROCEDURE — 6370000000 HC RX 637 (ALT 250 FOR IP): Performed by: PHYSICIAN ASSISTANT

## 2022-05-05 PROCEDURE — 97116 GAIT TRAINING THERAPY: CPT

## 2022-05-05 PROCEDURE — 86038 ANTINUCLEAR ANTIBODIES: CPT

## 2022-05-05 PROCEDURE — 6360000002 HC RX W HCPCS: Performed by: PHYSICIAN ASSISTANT

## 2022-05-05 PROCEDURE — 97535 SELF CARE MNGMENT TRAINING: CPT

## 2022-05-05 PROCEDURE — 97530 THERAPEUTIC ACTIVITIES: CPT

## 2022-05-05 PROCEDURE — 6370000000 HC RX 637 (ALT 250 FOR IP): Performed by: PSYCHIATRY & NEUROLOGY

## 2022-05-05 PROCEDURE — 2060000000 HC ICU INTERMEDIATE R&B

## 2022-05-05 PROCEDURE — 83735 ASSAY OF MAGNESIUM: CPT

## 2022-05-05 PROCEDURE — 80048 BASIC METABOLIC PNL TOTAL CA: CPT

## 2022-05-05 PROCEDURE — 87636 SARSCOV2 & INF A&B AMP PRB: CPT

## 2022-05-05 PROCEDURE — 99233 SBSQ HOSP IP/OBS HIGH 50: CPT | Performed by: INTERNAL MEDICINE

## 2022-05-05 PROCEDURE — 86430 RHEUMATOID FACTOR TEST QUAL: CPT

## 2022-05-05 PROCEDURE — 97162 PT EVAL MOD COMPLEX 30 MIN: CPT

## 2022-05-05 PROCEDURE — 97165 OT EVAL LOW COMPLEX 30 MIN: CPT

## 2022-05-05 RX ORDER — DIPHENHYDRAMINE HCL 25 MG
25 TABLET ORAL EVERY 6 HOURS PRN
Status: DISCONTINUED | OUTPATIENT
Start: 2022-05-05 | End: 2022-05-06 | Stop reason: HOSPADM

## 2022-05-05 RX ADMIN — POTASSIUM CHLORIDE 40 MEQ: 1500 TABLET, EXTENDED RELEASE ORAL at 13:02

## 2022-05-05 RX ADMIN — LEVOFLOXACIN 750 MG: 750 INJECTION, SOLUTION INTRAVENOUS at 05:56

## 2022-05-05 RX ADMIN — CITALOPRAM 20 MG: 20 TABLET, FILM COATED ORAL at 07:37

## 2022-05-05 ASSESSMENT — PAIN SCALES - GENERAL: PAINLEVEL_OUTOF10: 0

## 2022-05-05 NOTE — PLAN OF CARE
Problem: Discharge Planning  Goal: Discharge to home or other facility with appropriate resources  Outcome: Progressing     Problem: Safety - Adult  Goal: Free from fall injury  Outcome: Progressing  Flowsheets (Taken 5/5/2022 1251)  Free From Fall Injury:   Based on caregiver fall risk screen, instruct family/caregiver to ask for assistance with transferring infant if caregiver noted to have fall risk factors   Instruct family/caregiver on patient safety  Note: Pt using call light appropriately to call for assistance with ambulation to the bathroom and to chair. Pt is also compliant with use of non-skid slippers. Pt reports understanding of fall prevention when discussed. Problem: ABCDS Injury Assessment  Goal: Absence of physical injury  Outcome: Progressing     Problem: Skin/Tissue Integrity  Goal: Absence of new skin breakdown  Description: 1. Monitor for areas of redness and/or skin breakdown  2. Assess vascular access sites hourly  3. Every 4-6 hours minimum:  Change oxygen saturation probe site  4. Every 4-6 hours:  If on nasal continuous positive airway pressure, respiratory therapy assess nares and determine need for appliance change or resting period.   Outcome: Progressing     Problem: Pain  Goal: Verbalizes/displays adequate comfort level or baseline comfort level  Outcome: Progressing

## 2022-05-05 NOTE — PROGRESS NOTES
Hospitalist Progress Note    Patient:  Alice Price    YOB: 1995  Unit/Bed:4A-17/017-A  MRN: [id number]    Acct: [de-identified]   PCP: YOVANNY Longo CNP    Date of Admission: 5/2/2022      Assessment/Plan:  Amber Skylar Episode: Unclear etiology, no prodromal symptoms reported. CT head and MRI of the brain without any acute or chronic findings. EEG within normal limits. ABG unremarkable. Neurology consulted and have signed off. Patient reports many stressors recently, thinks she may have had a miscarriage 1 week ago despite negative beta hCG. Psych was consulted on 5/3 for possible conversion disorder. o Psych eval appreciated - will initiate atarax and celexa for EUGENE and persistent depression  o No further episodes, continue seizure precautions  o No indication for AED at this time. Continue to hold.  Dizziness: No focal neuro deficits on exam, MRI negative. No nystagmus, however could consider BPPV versus orthostasis. Orthostatics wnl.   o Did well with PT - rec home with assist prn.  Low-grade fevers/Petechial Rash: With lactic acidosis and WBC elevation on arrival -however these could have both been related to volume depletion, both resolved. Unclear source given lack of good history. UA clear, low suspicion for bacterial meningitis based on presentation. Seems to be getting better, consideration for ear infection (patient reports history of but no current symptoms) versus aspiration pneumonitis/pneumonia (during unresponsive episode). Also consideration for viral process with exanthem. i. Consult to Dr. Heike Davis. D/w him on 5/5 - States antibiotics can be stopped since no clear source. Low suspicion for any bacterial process at this time  o CXR shows increased bilateral non-specific infiltrates, worsened since arrival - possible pneumonitis as mentioned above. Procal <0.25.   o MRSA PCR negative. Got 3 doses levaquin, will stop.   o DONATO and RF obtained.  Minimal elevation of ESR 21 and CRP 1.1.   o Covid and flu negative.  Lactic acidosis Mild: Resolved, likely secondary to volume depletion improved with fluids.  Foot pain: 1st MTP joint bruising. Will check foot xr. Reports rock hit foot.  Mild hypokalemia: Normal mag, replacement protocol. Repeat BMP in the morning, replace prn.  Low normal BP: seems to run on lower side. Normal AM cortisol. Orthostatic negative. Stop IVF.  CBD positive / Hx of Cyclical vomiting syndrome. Not currently nausea. Counseling on cutting back/stoppiong. .           Expected discharge date:  ?1d    Disposition: pending course - monitor overnight while off antibiotics, likely dc in am.   [] Home  [] TCU  [] Rehab  [] Psych  [] SNF  [] [facility]  [] Other-    ===================================================================      Chief Complaint: unresponsive episode    Hospital Course: Per HPI, \"Patient presents to the ED from work. Per EMS history the patient was taking her lunch break and had an episode of vomiting. Per history patient has cyclic vomiting syndrome. The patient became unresponsive for EMS. She was noted to have some \"seizure\" activity with posturing. She was given Ativan. The patient had CT head in the ED to rule out acute hemorrhage or mass. Patient remains unresponsive throughout the visit. Patient is admitted to step down for continued evaluation of acute encephalopathy. \"    Subjective (past 24 hours): Patient seen at bedside, feeling a bit better today, reports some dizziness still with getting up and movement, as well as mild headache. No fevers subjectively. No numbness, tingling weakness. Reports toe pain on the left MTP joint.        Medications:  Reviewed    Infusion Medications    sodium chloride       Scheduled Medications    citalopram  20 mg Oral Daily    sodium chloride flush  5-40 mL IntraVENous 2 times per day    [Held by provider] enoxaparin  40 mg SubCUTAneous Daily  [Held by provider] levetiracetam  1,000 mg IntraVENous Q12H     PRN Meds: hydrOXYzine, sodium chloride flush, sodium chloride, ondansetron **OR** ondansetron, polyethylene glycol, acetaminophen **OR** acetaminophen, potassium chloride **OR** potassium alternative oral replacement **OR** potassium chloride, magnesium sulfate      ROS: reviewed from prior note, full ROS unchanged unless otherwise stated in hospital course/subjective portion. Intake/Output Summary (Last 24 hours) at 5/5/2022 1539  Last data filed at 5/5/2022 1871  Gross per 24 hour   Intake 480 ml   Output --   Net 480 ml       Exam:  /65   Pulse 90   Temp 98.1 °F (36.7 °C) (Oral)   Resp 18   Wt 158 lb 15.2 oz (72.1 kg)   SpO2 97%   BMI 32.10 kg/m²     General appearance: Overweight, more alert  Eyes:  PERRL. Conjunctivae/corneas clear. HENT: Head normal appearing. Nares normal. Oral mucosa moist.  Hearing intact. Neck: Supple, with full range of motion. Trachea midline. No gross JVD appreciated. Respiratory:  Normal effort. Clear to auscultation, without rales or wheezes or rhonchi. Cardiovascular: Normal rate, regular rhythm with normal S1/S2 without murmurs. No lower extremity edema. Abdomen: Soft, non-tender, non-distended with normal bowel sounds. Musculoskeletal: No joint swelling or tenderness. Normal tone. No abnormal movements. Bruising with tenderness of the medial 1st MTP joint. Skin: Warm and dry. No rashes or lesions. Petechial rash of bilateral inner thighs and torso. Neurologic:  No focal sensory/motor deficits in the upper or lower extremities. Cranial nerves:  grossly non-focal 2-12. Patient cannot deviate eyes past left midline, reports this is chronic from prior injury. Psychiatric: Alert and oriented. Slow to speak  Capillary Refill: Brisk,< 3 seconds. Peripheral Pulses: +2 palpable, equal bilaterally.        Labs:   Recent Labs     05/02/22  2318 05/04/22  0500   WBC 13.2* 7.5   HGB 12.4 11.3*   HCT 40.7 36.5*    284     Recent Labs     05/02/22  2318 05/04/22  0500 05/05/22  0540    140 140   K 3.8 3.4* 3.4*    107 108   CO2 20* 24 22*   BUN 8 6* 8   CREATININE 0.5 0.5 0.4   CALCIUM 8.5 8.2* 8.4*     Recent Labs     05/02/22  2318   AST 14   ALT 6*   BILIDIR <0.2   BILITOT 0.3   ALKPHOS 95     No results for input(s): INR in the last 72 hours. No results for input(s): [place] Bas[place] in the last 72 hours. Recent Labs     05/03/22  0827   PROCAL 0.11*      Lab Results   Component Value Date    NITRU NEGATIVE 05/02/2022    WBCUA 2-4 05/02/2022    BACTERIA NONE SEEN 05/02/2022    RBCUA 0-2 05/02/2022    BLOODU NEGATIVE 05/02/2022    SPECGRAV 1.029 05/02/2022    GLUCOSEU NEGATIVE 08/19/2021       Radiology (48 hours):  CT HEAD WO CONTRAST    Result Date: 5/2/2022  Impression: No acute intracranial abnormality is identified. This document has been electronically signed by: Elizabeth Mendez MD on 05/02/2022 10:54 PM All CTs at this facility use dose modulation techniques and iterative reconstructions, and/or weight-based dosing when appropriate to reduce radiation to a low as reasonably achievable. MRA HEAD WO CONTRAST    Result Date: 5/3/2022   Normal MRA of the brain. **This report has been created using voice recognition software. It may contain minor errors which are inherent in voice recognition technology. ** Final report electronically signed by Dr. Ezekiel Lee on 5/3/2022 2:56 PM    XR CHEST PORTABLE    Result Date: 5/3/2022  The PICC line now terminates in the SVC. Otherwise similar appearance of the chest when compared to the prior study. **This report has been created using voice recognition software. It may contain minor errors which are inherent in voice recognition technology. ** Final report electronically signed by Dr Ling Damon on 5/3/2022 4:08 PM    XR CHEST PORTABLE    Result Date: 5/3/2022  Interval placement of a right-sided PICC which terminates in the right atrium. **This report has been created using voice recognition software. It may contain minor errors which are inherent in voice recognition technology. ** Final report electronically signed by Dr Seamus Siu on 5/3/2022 1:55 PM    XR CHEST PORTABLE    Result Date: 5/2/2022  Impression: No consolidation. This document has been electronically signed by: Karmen Mcpherson MD on 05/02/2022 11:13 PM    MRA NECK W WO CONTRAST    Result Date: 5/3/2022   Normal MRA of the neck. **This report has been created using voice recognition software. It may contain minor errors which are inherent in voice recognition technology. ** Final report electronically signed by Dr. Janell Hernandez on 5/3/2022 3:47 PM    MRI BRAIN W WO CONTRAST    Result Date: 5/3/2022   1. No acute findings. 2. Normal signal intensity in the brain. **This report has been created using voice recognition software. It may contain minor errors which are inherent in voice recognition technology. ** Final report electronically signed by Dr. Janell Hernandez on 5/3/2022 3:44 PM       DVT prophylaxis:    [x] Lovenox  [] SCDs  [] SQ Heparin  [] Encourage ambulation   [] Already on Anticoagulation       Diet: ADULT DIET;  Regular  Code Status: Full Code  PT/OT: yes  Tele: yes  IVF: will stop    Electronically signed by Laverna Lesch, DO on 5/5/2022 at 3:39 PM

## 2022-05-05 NOTE — PROGRESS NOTES
Aime[address]orge Christine 61  INPATIENT OCCUPATIONAL THERAPY  [facility] NEUROSCIENCES 4A  EVALUATION    Time:   Time In: 4063  Time Out: 6852  Timed Code Treatment Minutes: 10 Minutes  Minutes: 20          Date: 2022  Patient Name: Shraddha Huntley,   Gender: female      MRN: [id number]  : 1995  (33 y.o.)  Referring Practitioner: Vidhya Torres DO  Diagnosis: Acute encephalopathy  Additional Pertinent Hx: Per chart review, \"Patient c/o nausea/vomiting, then became unresponsive prior to arrival while in EMS rig. Patient with possible seizure-like activity on examination however patient also noted to have purposeful movements during these episodes still unclear whether it was true seizure activity. Patient given 4 mg of Ativan, 2 g of Keppra. CT head negative. Laboratory work-up shows UDS positive for cannabinoids, minimal leukocytosis of 13 which appears to be similar to multiple previous ED visits, unclear if reactive from vomiting versus questionable seizure versus other process. Patient with some improvement in mental status over ED stay unresponsive to painful stimuli but remains altered. Patient noted to have abnormal behaviors such as when providers would comment on lack of rigidity in 1 arm, the arm suddenly became rigid. \"    Restrictions/Precautions:  Restrictions/Precautions: Fall Risk,General Precautions    Subjective  Chart Reviewed: Yes,Orders,Progress Notes,History and Physical,Labs  Patient assessed for rehabilitation services?: Yes  Family / Caregiver Present: No    Subjective: RN approved session. Patient supine in bed upon OT arrival, A&Ox4 and agreeable to eval.    Pain: Pain reported due to skin rash.     Vitals: Vitals not assessed per clinical judgement, see nursing flowsheet    Social/Functional History:  Lives With: Significant other  Type of Home: House  Home Layout: One level  Home Access: Level entry   Bathroom Shower/Tub: Walk-in shower  Bathroom Toilet: Standard  Bathroom Equipment: Grab bars in shower       ADL Assistance: [address]: Independent  Homemaking Responsibilities: Yes  Ambulation Assistance: Independent  Transfer Assistance: Independent    Active : No  Patient's  Info: has transportation  Mode of Transportation: Car  Occupation: Part time employment  Type of Occupation: [employer]       VISION:WFL    HEARING:  WFL    COGNITION: WFL    RANGE OF MOTION:  Bilateral Upper Extremity:  WFL    STRENGTH:  Bilateral Upper Extremity:  WFL    SENSATION:   WFL    ADL:   Grooming: Independent. patient stood at sink to wash hands and brush teeth. Lower Extremity Dressing: Independent. to frederick slipper socks. Toileting: Independent. .  Toilet Transfer: Independent. .. BALANCE:  Sitting Balance:  Independent. .  Standing Balance: Independent. .    BED MOBILITY:  Supine to Sit: Independent . TRANSFERS:  Sit to Stand:  Independent. .  Stand to Sit: Independent. .    FUNCTIONAL MOBILITY:  Assistive Device: None  Assist Level: Independent and Supervision. Distance: To and from bathroom     Activity Tolerance:  Patient tolerance of  treatment: good. Patient was cooperative, she did not voice any concerns with returning home or having any limitations with daily living skills. She demo safety awareness and did not demo LOB or SOB during functional mobility. Assessment:  Assessment: Patient presented with some deconditioning. SHe was able to walk with no AD and complete ADLs at prior level and voiced no concerns with returning home at this time. OT to evaluate only and d/c.  Performance deficits / Impairments: Decreased endurance  Prognosis: Good  REQUIRES OT FOLLOW-UP: No  No Skilled OT: Independent with functional mobility,Independent with ADL's,At baseline function,Safe to return home,No OT goals identified  Decision Making: Low Complexity    Treatment Initiated: Treatment and education initiated within context of evaluation.   Evaluation time included review of current medical information, gathering information related to past medical, social and functional history, completion of standardized testing, formal and informal observation of tasks, assessment of data and development of plan of care and goals. Treatment time included skilled education and facilitation of tasks to increase safety and independence with ADL's for improved functional independence and quality of life. Discharge Recommendations:  Defer OT at this time    Patient Education:     Patient Education  Education Given To: Patient  Education Provided: Role of Therapy,Transfer Training,Fall Prevention Strategies,ADL Adaptive Strategies  Education Provided Comments: importance of increaing activity  Barriers to Learning: None    Equipment Recommendations:  Equipment Needed: No    Plan:   .  See long-term goal time frame for expected duration of plan of care. If no long-term goals established, a short length of stay is anticipated. Goals:  Patient goals : go home  Short Term Goals  Time Frame for Short term goals: No OT goals identified due to patient at baseline. Following session, patient left in safe position with all fall risk precautions in place.

## 2022-05-05 NOTE — PROGRESS NOTES
[address] [address]  INPATIENT PHYSICAL THERAPY  EVALUATION  [facility] 4A - 4A-17/017-A    Time In: 7479  Time Out: 1129  Timed Code Treatment Minutes: 23 Minutes  Minutes: 32          Date: 2022  Patient Name: Valente Sanchez,  Gender:  female        MRN: [id number]  : 1995  (33 y.o.)      Referring Practitioner: Amadou Santamaria, DO  Diagnosis: Seizure-like activity  Additional Pertinent Hx: Per H&P 5/3:Patient presents to the ED from work. Per EMS history the patient was taking her lunch break and had an episode of vomiting. Per history patient has cyclic vomiting syndrome. The patient became unresponsive for EMS. She was noted to have some \"seizure\" activity with posturing. She was given Ativan. The patient had CT head in the ED to rule out acute hemorrhage or mass. Patient remains unresponsive throughout the visit. Patient is admitted to step down for continued evaluation of acute encephalopathy. Pt presents with all imaging negative for acute process- STAT MRI Brain W & WO Contrast- normal, STAT MRA H&N- normal, STAT EEG- normal eeg, no sign of epileptiform activity. Pt presents s/p psych eval.     Restrictions/Precautions:  Restrictions/Precautions: Fall Risk,General Precautions    Subjective:  Chart Reviewed: Yes  Patient assessed for rehabilitation services?: Yes  Family / Caregiver Present: No  Subjective: RN approved session. Pt pleasantly agreeable. Pt states no concern d/c home, and states she does not require PT upon d/c. Pt states her ambulation is at it's baseline level of function. Pt declines any c/o of room spinning when asked, states her head feels \"light\" not spinning. Pt states this improved with return to supine, but is still present at times in supine. Pt declined BPPV testing this date upon discussing the differences between feeling lightheaded, having brain fog, or a headache and BPPV. Pt satets she plans to return to work upon d/c.  Much time to educate on not overexerting herself, accepting help as needed, and to ease back into her routine. General:  Overall Orientation Status: Within Functional Limits  Vision Exceptions: Wears glasses for reading  Vision  Vision: Impaired  Vision Exceptions: Wears glasses for reading  Hearing  Hearing: Within functional limits  Hearing: Within functional limits       Pain: 4/10: tension headache distribution, states it has been consistent since being at the hospital     Vitals: Blood Pressure: 106/65  Heart Rate: 80's to 90's entire session    BP checked following ambulation and pt c/o head feeling \"light\"  RN aware     Social/Functional History:    Lives With: Significant other (21 month old son)  Type of Home: House  Home Layout: One level  Home Access: Level entry     Bathroom Shower/Tub: Walk-in shower  Bathroom Toilet: Standard  Bathroom Equipment: Grab bars in shower       ADL Assistance: [address]: Independent  Homemaking Responsibilities: Yes  Ambulation Assistance: Independent  Transfer Assistance: Independent    Active : No  Mode of Transportation: Car  Occupation: Part time employment  Type of Occupation: [employer]  Additional Comments: Pt indep with all mobility and working part time. Pt has a young son that she cares for. Pt's S/O also works.     OBJECTIVE:  Range of Motion:  Bilateral Lower Extremity: WFL    Strength:  Right Lower Extremity:Hip flexion 4+/5, knee extension 4+/5, knee flexion 4+/5, DF 4+/5, PF 4+/5    Left Lower Extremity: Hip flexion 4/5, knee extension 4+/5, knee flexion 4+/5, DF 4+/5, PF 4+/5    Balance:  Static Sitting Balance:  Supervision  Dynamic Sitting Balance: Supervision  Static Standing Balance: Stand By Assistance  Dynamic Standing Balance: Contact Guard Assistance   CGA with Tinetti     Bed Mobility:  Supine to Sit: Supervision  Sit to Supine: Supervision     Transfers:  Sit to Stand: Supervision, Stand By Assistance  Stand to [address], Stand By Assistance    Ambulation:  Stand By Assistance, with increased time for completion  Distance: ~190'  Surface: Level Tile  Device:No Device  Gait Deviations:  Slow Douglas, Wide Base of Support and Mild Path Deviations  SBA provided for safety, pt noted to have slow douglas, states this is her baseline gait pattern. Pt states her head feeling \"light\" following ambulation, seated rest and BP taken at this time with improvement in symptoms. Exercise:  None this session     Functional Outcome Measures: Completed  Balance Score: 14  Gait Score: 10  Tinetti Total Score: 24/28 with low fall risk  AM-PAC Inpatient Mobility Raw Score : 17  AM-PAC Inpatient T-Scale Score : 42.13    Risk Indicators:  Less than/equal to 18 = high risk  19-23 Moderate risk  Greater than/equal to 24 = low risk    Vestibular Screening Tool: 4/8 points. May predict vestibular dysfunction as cause to nonemergent dizziness    ASSESSMENT:  Activity Tolerance:  Patient tolerance of  treatment: good. Pt tolerated mobility well, no losses in stability, however, noted to ambulate slowly. Pt  completed bed mobility, transfers, and ambualtion with SBA to S and states this is her baseline level of function and denies further PT needs. Pt scored a 24/28 on the Tinetti indicating a low fall risk. Education provide on not overexerting herself upon d/c. Treatment Initiated: Treatment and education initiated within context of evaluation. Evaluation time included review of current medical information, gathering information related to past medical, social and functional history, completion of standardized testing, formal and informal observation of tasks, assessment of data and development of plan of care and goals. Treatment time included skilled education and facilitation of tasks to increase safety and independence with functional mobility for improved independence and quality of life. Assessment:  Assessment:  This patient is a 33 y.o. who presents with seizure-like activity. Pt ambulated with SBA, completed transfers and bed mobility with SBA to supervision with no losses in stability. Pt scored a 24/28 on the Tinetti indicating a low fall risk. Pt is at her baseline level of function and does not require further skilled PT services. Therapy Prognosis: Excellent    Requires PT Follow-Up: No    Discharge Recommendations:  Discharge Recommendations: Home with assist PRN    Patient Education:      . Patient Education  Education Given To: Patient  Education Provided: Plan of Care,Energy Conservation  Education Method: Demonstration,Verbal  Education Outcome: Verbalized understanding       Equipment Recommendations:  Equipment Needed: No    Plan:  Plan:  (D/C 5/5/22)    Goals:  Patient goals : .  Short Term Goals  Time Frame for Short term goals: NA  Long Term Goals  Time Frame for Long term goals : NA    Following session, patient left in safe position with all fall risk precautions in place.

## 2022-05-05 NOTE — PLAN OF CARE
Problem: Discharge Planning  Goal: Discharge to home or other facility with appropriate resources  5/5/2022 1344 by Alvin David RN  Outcome: Completed  5/5/2022 1259 by Alvin David RN  Outcome: Progressing     Problem: Safety - Adult  Goal: Free from fall injury  5/5/2022 1344 by Alvin David RN  Outcome: Completed  5/5/2022 1259 by Alvin David RN  Outcome: Progressing  Flowsheets (Taken 5/5/2022 1259)  Free From Fall Injury:   Based on caregiver fall risk screen, instruct family/caregiver to ask for assistance with transferring infant if caregiver noted to have fall risk factors   Instruct family/caregiver on patient safety  Note: Pt using call light appropriately to call for assistance with ambulation to the bathroom and to chair. Pt is also compliant with use of non-skid slippers. Pt reports understanding of fall prevention when discussed. Problem: ABCDS Injury Assessment  Goal: Absence of physical injury  5/5/2022 1344 by Alvin David RN  Outcome: Completed  5/5/2022 1259 by Alvin David RN  Outcome: Progressing     Problem: Skin/Tissue Integrity  Goal: Absence of new skin breakdown  Description: 1. Monitor for areas of redness and/or skin breakdown  2. Assess vascular access sites hourly  3. Every 4-6 hours minimum:  Change oxygen saturation probe site  4. Every 4-6 hours:  If on nasal continuous positive airway pressure, respiratory therapy assess nares and determine need for appliance change or resting period.   5/5/2022 1344 by Alvin David RN  Outcome: Completed  5/5/2022 1259 by Alvin David RN  Outcome: Progressing     Problem: Pain  Goal: Verbalizes/displays adequate comfort level or baseline comfort level  5/5/2022 1344 by Alvin David RN  Outcome: Completed  5/5/2022 1259 by Alvin David RN  Outcome: Progressing

## 2022-05-05 NOTE — PROGRESS NOTES
Triple lumen PICC Line removed per order. Patient tolerated well. Site cleansed with chlorhexidene, PICC removed upon exhalation. Vas gerson gauze, gauze and dressing applied to site. Patient educated to removed dressing in 24 hours and to wash site with soaps and water as usual,  monitor for signs and symptoms of infection and to return to hospital if any noted. Patient verbalized understanding. No further question or concerns voiced at this time.

## 2022-05-05 NOTE — PROGRESS NOTES
Physician Progress Note      PATIENTTiajuana Hatchet  CSN #:                  [id number]  :                       1995  ADMIT DATE:       2022 9:49 PM  [address] DATE:  RESPONDING  PROVIDER #:        Abel WEAVER DO          QUERY TEXT:    Patient admitted with AMS. Per H&P, pt has \"SIRS. \"  Pt also noted to have   lactic acidosis, 3.0. If possible, please respond below and document in the   progress notes and discharge summary if you are evaluating and/or treating any   of the following: The medical record reflects the following:  Risk Factors: AMS, vomiting, marijuana use  Clinical Indicators: Per H&P, pt has \"SIRS. \"  Pt also noted to have lactic   acidosis, 3.0. Treatment: IVF, acyclovir x 1, IV Vanc, IV Levaquin, Tylenol, labs, imaging    Thank you! Chanda Ly RN, BSN, RHIT, CCDS  Clinical   Options provided:  -- Non-infectious SIRS with acute organ dysfunction of lactic acidosis  -- Non-infectious SIRS without acute organ dysfunction  -- Other - I will add my own diagnosis  -- Disagree - Not applicable / Not valid  -- Disagree - Clinically unable to determine / Unknown  -- Refer to Clinical Documentation Reviewer    PROVIDER RESPONSE TEXT:    This patient has non-infectious SIRS with acute organ dysfunction of lactic   acidosis.     Query created by: Severa Ona on 2022 11:30 AM      Electronically signed by:  Adrienne Albarran DO 2022 7:03 AM
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
## 2022-05-05 NOTE — CONSULTS
[address] [place] [address]                                  CONSULTATION    PATIENT NAME: Vini Soni                     :        1995  MED REC NO:   [id number]                           ROOM:       0017  ACCOUNT NO:   [de-identified]                           ADMIT DATE: 2022  PROVIDER:     Xander Leigh. Hernan Ayers M.D.    Jada Go:  2022    HISTORY OF PRESENT ILLNESS:  She is a 60-year-old female patient  admitted to hospital due to unresponsive state. She reports that she  was at work and she passed out and was brought to the hospital.  She has  no known history of seizure. I was asked to see this patient because  she has rash. She has been on oral Levaquin since admission. She  denies any cough or chest pain. No abdominal pain. She has an itchy  rash on her neck, axilla, and groin area. She denies any history of  seizure. She used to have seizure when she was a child related to  fever. PAST MEDICAL HISTORY:  Significant for acid reflux, asthma, and cyclic  vomiting syndrome. SOCIAL HISTORY:  She is , has a child which is 21years old. She  does not smoke. Denies any use of drugs. MEDICATIONS:  Include Tylenol, Celexa, Lovenox, hydroxyzine, Keppra,  Levaquin, Zofran, polyethylene glycol. REVIEW OF SYSTEMS:  Noncontributory. PHYSICAL EXAMINATION:  VITAL SIGNS:  Temperature 98.1, respirations 18, pulse 90, blood  pressure 106/65. HEENT:  She has pink conjunctivae, anicteric sclerae. She has  maculopapular rash on her axilla, lower abdomen, mid chest.  CARDIOVASCULAR SYSTEM:  Regular. ABDOMEN:  Soft. EXTREMITIES:  No rash. CNS:  Awake and oriented to person, place, and time. DIAGNOSTICS:  Lab work reviewed. Her WBC was 7.5, hemoglobin 11.3,  hematocrit 36.5, platelets of 948. Sodium of 140, potassium 3.12,  chloride 108, bicarb 22, BUN 8, creatinine 0.4.   C-reactive protein was  1.07.  X-ray was negative. IMPRESSION:  She is a 76-year-old female patient admitted with  unresponsive state. It happened while she was eating food, not sure  whether this is vagal.  Denies any biting of skin or incontinence. Not  sure if she had seizure episode. At the present time, I do not see any  infectious source for her unresponsiveness. She has rash which may be  related to medications administered. RECOMMENDATIONS:  Stop Levaquin, remove PICC line, and consider  discharging the patient.         Rosamond Sandhoff, M.D.    D: 05/05/2022 13:55:09       T: 05/05/2022 17:37:18     [email or web address]  Job#: [id number]     Doc#: [id number]    CC:

## 2022-05-06 VITALS
HEART RATE: 81 BPM | BODY MASS INDEX: 32.1 KG/M2 | RESPIRATION RATE: 18 BRPM | DIASTOLIC BLOOD PRESSURE: 57 MMHG | SYSTOLIC BLOOD PRESSURE: 98 MMHG | WEIGHT: 158.95 LBS | OXYGEN SATURATION: 97 % | TEMPERATURE: 98.6 F

## 2022-05-06 LAB — POTASSIUM REFLEX MAGNESIUM: 3.9 MEQ/L (ref 3.5–5.2)

## 2022-05-06 PROCEDURE — 36415 COLL VENOUS BLD VENIPUNCTURE: CPT

## 2022-05-06 PROCEDURE — 84132 ASSAY OF SERUM POTASSIUM: CPT

## 2022-05-06 PROCEDURE — 6370000000 HC RX 637 (ALT 250 FOR IP): Performed by: PSYCHIATRY & NEUROLOGY

## 2022-05-06 PROCEDURE — 99239 HOSP IP/OBS DSCHRG MGMT >30: CPT | Performed by: INTERNAL MEDICINE

## 2022-05-06 PROCEDURE — 6370000000 HC RX 637 (ALT 250 FOR IP): Performed by: PHYSICIAN ASSISTANT

## 2022-05-06 RX ORDER — CITALOPRAM 20 MG/1
20 TABLET ORAL DAILY
Qty: 30 TABLET | Refills: 1 | Status: SHIPPED | OUTPATIENT
Start: 2022-05-07

## 2022-05-06 RX ORDER — HYDROXYZINE 50 MG/1
50 TABLET, FILM COATED ORAL 3 TIMES DAILY PRN
Qty: 60 TABLET | Refills: 0 | Status: SHIPPED | OUTPATIENT
Start: 2022-05-06

## 2022-05-06 RX ADMIN — CITALOPRAM 20 MG: 20 TABLET, FILM COATED ORAL at 09:31

## 2022-05-06 RX ADMIN — DIPHENHYDRAMINE HCL 25 MG: 25 TABLET ORAL at 00:01

## 2022-05-06 NOTE — CARE COORDINATION
5/6/22, 11:12 AM EDT    DISCHARGE PLANNING EVALUATION    [facility] copay for her scripts as she is unable to pay for them.

## 2022-05-06 NOTE — PROGRESS NOTES
Discharge teaching and instructions for diagnosis/procedure of AMS completed with patient using teachback method. AVS reviewed. Printed prescriptions given to patient. Patient voiced understanding regarding prescriptions, follow up appointments, and care of self at home. Discharged in a wheelchair to her workplace ([employer]) with support per taxi service.

## 2022-05-06 NOTE — PROGRESS NOTES
CLINICAL PHARMACY: DISCHARGE MED RECONCILIATION/REVIEW    [facility] ([facility]) Select Patient?: No  Total # of Interventions Recommended: 0     Total # Interventions Accepted: 0  Intervention Severity:   - Level 1 Intervention Present?: No   - Level 2 #: 0   - Level 3 #: 0   Time Spent (min):  20    Additional Documentation:    Discharge medication reconciliation reviewed and complete.     David Shea, PharmD, BCPS  5/6/2022  10:50 AM

## 2022-05-06 NOTE — CARE COORDINATION
5/6/22, 1:07 PM EDT    Patient goals/plan/ treatment preferences discussed by  and . Patient goals/plan/ treatment preferences reviewed with patient/ family. Patient/ family verbalize understanding of discharge plan and are in agreement with goal/plan/treatment preferences. Understanding was demonstrated using the teach back method. AVS provided by RN at time of discharge, which includes all necessary medical information pertaining to the patients current course of illness, treatment, post-discharge goals of care, and treatment preferences. Services At/After Discharge: None   Pt to be discharged to home. She denies needs or services.

## 2022-05-07 LAB — ANA SCREEN: NORMAL

## 2022-05-07 NOTE — DISCHARGE SUMMARY
was negative and no signs of skin infection. Bacterial meningitis was felt to be very unlikely given her presentation, and lack of meningeal signs. There as possible pneumonitis on CXR, possibly from aspiration during her syncopal event, however no other infectious process was found (negative Covid/Flu, neg blood culture and UA). ESR and CRP were not significantly elevated. She developed a petechial rash in the groin and trunk, thus given her odd clinical picture Dr. Víctor Vernon was consulted to assess. He felt this was not concerning for an infectious process. Antibiotics were stopped and she was monitored for 24hrs, no recurrent fevers and rash began to resolve. Overall, the picture is unclear, but patient may have had post-tussive vasovagal syncope, seizure with postictal state, or a brief LOC with myoclonus that was treated with high doses of sedating medications which lead to a protracted somnolent state. Ultimately, there was many stressors in her life, and Neuro recommended Psych consult. She was started on atarax and celexa. She was discharged in stable condition. PT/OT consulted and she was getting around without any dizziness, orthostatics were negative. Seizure precautions were given on discharge since the LOC was of unknown etiology. Patient understands the recommendations. She was given info to follow up with Dr. Cata Gongora as outpatient for ongoing workup if indicated. Discharge Diagnoses:  Post-Tussive Syncopal Event  Myoclonic activity: unclear circumstances, prior to arrival  Prolonged Somnolent State / AMS: resolved. Possible related to sedating medications  Dizziness: improved, likely related to above. Petechial rash: unclear etiology, possibly medication related vs viral prodrome. Self limited. Lactic Acid Elevation: on arrival, possible related to volume depletion/vomiting. Resolved.    Persistent Depression / Anxiety: celexa and atarax  Hx of cyclical vomiting syndrome  Mild Hypokalemia  Cannibis abuse: counseling on stopping/cutting back    The patient was seen and examined on day of discharge and this discharge summary is in conjunction with any daily progress note from day of discharge. The patient is discharged in stable condition. Exam:     Vitals:  Vitals:    05/05/22 2344 05/06/22 0340 05/06/22 0929 05/06/22 1046   BP: (!) 100/51 (!) 104/58 119/65 (!) 98/57   Pulse: 63 82 85 81   Resp: 18 18 16 18   Temp: 99.5 °F (37.5 °C) 98.5 °F (36.9 °C) 98.6 °F (37 °C) 98.6 °F (37 °C)   TempSrc: Oral Oral Oral Oral   SpO2: 96% 99% 99% 97%   Weight:         Weight: Weight: 158 lb 15.2 oz (72.1 kg)     24 hour intake/output:No intake or output data in the 24 hours ending 05/07/22 1944    General appearance: Overweight, more alert  Eyes:  PERRL. Conjunctivae/corneas clear. HENT: Head normal appearing. Nares normal. Oral mucosa moist.  Hearing intact. Neck: Supple, with full range of motion. Trachea midline. No gross JVD appreciated. Respiratory:  Normal effort. Clear to auscultation, without rales or wheezes or rhonchi. Cardiovascular: Normal rate, regular rhythm with normal S1/S2 without murmurs. No lower extremity edema. Abdomen: Soft, non-tender, non-distended with normal bowel sounds. Musculoskeletal: No joint swelling or tenderness. Normal tone. No abnormal movements. Bruising with tenderness of the medial 1st MTP joint. Skin: Warm and dry\. Petechial rash of bilateral inner thighs and torso is resolving. Neurologic:  No focal sensory/motor deficits in the upper or lower extremities. Cranial nerves:  grossly non-focal 2-12. Patient cannot deviate eyes past left midline, reports this is chronic from prior injury. Psychiatric: Alert and oriented. Slow to speak, flat affect      Labs:  For convenience and continuity at follow-up the following most recent labs are provided:    CBC:    Lab Results   Component Value Date    WBC 7.5 05/04/2022 HGB 11.3 05/04/2022    HCT 36.5 05/04/2022     05/04/2022       Renal:    Lab Results   Component Value Date     05/05/2022    K 3.9 05/06/2022     05/05/2022    CO2 22 05/05/2022    BUN 8 05/05/2022    CREATININE 0.4 05/05/2022    CALCIUM 8.4 05/05/2022         Significant Diagnostic Studies    Radiology:   XR CHEST PORTABLE   Final Result   The PICC line now terminates in the SVC. Otherwise similar appearance of the chest when compared to the prior study. **This report has been created using voice recognition software. It may contain minor errors which are inherent in voice recognition technology. **      Final report electronically signed by Dr Neymar Dobson on 5/3/2022 4:08 PM      MRA NECK W WO CONTRAST   Final Result    Normal MRA of the neck. **This report has been created using voice recognition software. It may contain minor errors which are inherent in voice recognition technology. **      Final report electronically signed by Dr. Chato Overton on 5/3/2022 3:47 PM      MRI BRAIN W WO CONTRAST   Final Result       1. No acute findings. 2. Normal signal intensity in the brain. **This report has been created using voice recognition software. It may contain minor errors which are inherent in voice recognition technology. **         Final report electronically signed by Dr. Chato Overton on 5/3/2022 3:44 PM      MRA HEAD WO CONTRAST   Final Result    Normal MRA of the brain. **This report has been created using voice recognition software. It may contain minor errors which are inherent in voice recognition technology. **      Final report electronically signed by Dr. Chato Overton on 5/3/2022 2:56 PM      XR CHEST PORTABLE   Final Result   Interval placement of a right-sided PICC which terminates in the right atrium. **This report has been created using voice recognition software.  It may contain minor errors which are inherent in voice recognition technology. **      Final report electronically signed by Dr Clotilde Ayon on 5/3/2022 1:55 PM      XR CHEST PORTABLE   Final Result   Impression:   No consolidation. This document has been electronically signed by: Poppy Avila MD on    05/02/2022 11:13 PM      CT HEAD WO CONTRAST   Final Result   Impression:   No acute intracranial abnormality is identified. This document has been electronically signed by: Poppy Avila MD on    05/02/2022 10:54 PM      All CTs at this facility use dose modulation techniques and iterative    reconstructions, and/or weight-based dosing   when appropriate to reduce radiation to a low as reasonably achievable. Consults:     IP CONSULT TO NEUROLOGY  IP CONSULT TO PSYCHIATRY  IP CONSULT TO INFECTIOUS DISEASES    Disposition: Home  Condition at Discharge: Stable    Code Status:  Prior full    Patient Instructions:    Discharge lab work: n/a  Activity: activity as tolerated  Diet: No diet orders on file      Follow-up visits:   YOVANNY Sterling - DAVID  [address] [place] [address] Road [facility]  [phone]    Go on 5/19/2022  FOLLOW UP AT 2:30PM    Rox Fields MD  [address] [place] [address],[facility] Floor [address] [place] [address]      Office will call you with appointment in 1 month.           Discharge Medications:        Medication List      START taking these medications    citalopram 20 MG tablet  Commonly known as: CELEXA  Take 1 tablet by mouth daily  Notes to patient: Helps with anxiety or depression     hydrOXYzine 50 MG tablet  Commonly known as: ATARAX  Take 1 tablet by mouth 3 times daily as needed for Itching or Anxiety        CONTINUE taking these medications    acetaminophen 325 MG tablet  Commonly known as: Aminofen  Take 2 tablets by mouth every 6 hours as needed for Pain     ALBUTEROL SULFATE IN     dicyclomine 10 MG capsule  Commonly known as: Bentyl  Take 1 capsule by mouth 4 times daily (before meals and nightly)     ibuprofen 600 MG tablet  Commonly known as: ADVIL;MOTRIN  Take 1 tablet by mouth every 6 hours as needed for Pain     lidocaine viscous hcl 2 % Soln solution  Commonly known as: XYLOCAINE  Place 15 mLs vaginally as needed for Irritation (apply to vaginal and rectal area)     ondansetron 4 MG disintegrating tablet  Commonly known as: Zofran ODT  Take 1 tablet by mouth every 8 hours as needed for Nausea or Vomiting     PRENATAL 1 PO     tucks 50 % Pads  Apply 1 each topically as needed for Hemorrhoids        STOP taking these medications    OLANZapine 2.5 MG tablet  Commonly known as: ZYPREXA           Where to Get Your Medications      These medications were sent to [facility] [address], [address]  [address], [facility] [address]    Phone: [phone]   · citalopram 20 MG tablet  · hydrOXYzine 50 MG tablet            Time Spent on discharge is 35 minutes in the examination, evaluation, counseling and review of medications and discharge plan. Thank you YOVANNY Longo CNP for the opportunity to be involved in this patient's care.       Signed:    Electronically signed by Alfonso Rocha DO on 5/7/22 at 7:44 PM EDT

## 2022-05-08 LAB
BLOOD CULTURE, ROUTINE: NORMAL
BLOOD CULTURE, ROUTINE: NORMAL

## 2022-08-29 ENCOUNTER — APPOINTMENT (OUTPATIENT)
Dept: GENERAL RADIOLOGY | Age: 27
End: 2022-08-29
Payer: MEDICAID

## 2022-08-29 ENCOUNTER — HOSPITAL ENCOUNTER (EMERGENCY)
Age: 27
Discharge: HOME | End: 2022-08-29
Payer: MEDICAID

## 2022-08-29 VITALS
DIASTOLIC BLOOD PRESSURE: 65 MMHG | RESPIRATION RATE: 18 BRPM | OXYGEN SATURATION: 100 % | SYSTOLIC BLOOD PRESSURE: 112 MMHG | BODY MASS INDEX: 35.28 KG/M2 | HEIGHT: 59 IN | WEIGHT: 175 LBS | TEMPERATURE: 97.9 F | HEART RATE: 92 BPM

## 2022-08-29 DIAGNOSIS — S93.409A GRADE 1 ANKLE SPRAIN: Primary | ICD-10-CM

## 2022-08-29 PROCEDURE — 73610 X-RAY EXAM OF ANKLE: CPT

## 2022-08-29 PROCEDURE — 99213 OFFICE O/P EST LOW 20 MIN: CPT | Performed by: NURSE PRACTITIONER

## 2022-08-29 PROCEDURE — 99213 OFFICE O/P EST LOW 20 MIN: CPT

## 2022-08-29 ASSESSMENT — PAIN DESCRIPTION - LOCATION: LOCATION: ANKLE

## 2022-08-29 ASSESSMENT — PAIN DESCRIPTION - DESCRIPTORS: DESCRIPTORS: DISCOMFORT

## 2022-08-29 ASSESSMENT — PAIN DESCRIPTION - FREQUENCY: FREQUENCY: CONTINUOUS

## 2022-08-29 ASSESSMENT — PAIN DESCRIPTION - ONSET: ONSET: SUDDEN

## 2022-08-29 ASSESSMENT — PAIN - FUNCTIONAL ASSESSMENT
PAIN_FUNCTIONAL_ASSESSMENT: PREVENTS OR INTERFERES SOME ACTIVE ACTIVITIES AND ADLS
PAIN_FUNCTIONAL_ASSESSMENT: 0-10

## 2022-08-29 ASSESSMENT — PAIN DESCRIPTION - PAIN TYPE: TYPE: ACUTE PAIN

## 2022-08-29 ASSESSMENT — PAIN DESCRIPTION - ORIENTATION: ORIENTATION: RIGHT

## 2022-08-29 ASSESSMENT — ENCOUNTER SYMPTOMS: COLOR CHANGE: 0

## 2022-08-29 ASSESSMENT — PAIN SCALES - GENERAL: PAINLEVEL_OUTOF10: 6

## 2022-08-29 NOTE — ED TRIAGE NOTES
Pt to urgent care due to a right ankle injury that occurred yesterday as she was walking to work. Pt states she rolled her ankle.

## 2022-08-29 NOTE — ED PROVIDER NOTES
[facility]       CHIEF COMPLAINT       Chief Complaint   Patient presents with    Ankle Injury     right       Nurses Notes reviewed and I agree except as noted in the HPI. HISTORY OF PRESENT ILLNESS   Filiberto Nguyen is a 33 y.o. female who presents with complaints of right ankle pain. She states this is a new problem after she rolled her ankle yesterday while walking on the concrete. She admits to a history of ankle sprains as well as a previous hardware insertion to her ankle. She complains of pain to the lateral side of her ankle. Bearing weight and range of motion seems to make the pain worse. She has no bruising, numbness, or tingling. She did try ibuprofen without any relief. Denies any radiation of pain into her foot or leg. The history is provided by the patient. REVIEW OF SYSTEMS     Review of Systems   Constitutional:  Negative for activity change. Cardiovascular:  Negative for leg swelling. Musculoskeletal:  Positive for arthralgias (right ankle) and joint swelling. Negative for gait problem and myalgias. Skin:  Negative for color change and wound. Neurological:  Positive for weakness (right ankle). Negative for numbness. PAST MEDICAL HISTORY         Diagnosis Date    Acid reflux     Asthma     Cyclic vomiting syndrome        SURGICALHISTORY     Patient  has a past surgical history that includes Tonsillectomy; Ankle fracture surgery (Right, 2014); and eye surgery.     CURRENT MEDICATIONS       Previous Medications    ACETAMINOPHEN (AMINOFEN) 325 MG TABLET    Take 2 tablets by mouth every 6 hours as needed for Pain    ALBUTEROL SULFATE IN    Inhale into the lungs    CITALOPRAM (CELEXA) 20 MG TABLET    Take 1 tablet by mouth daily    DICYCLOMINE (BENTYL) 10 MG CAPSULE    Take 1 capsule by mouth 4 times daily (before meals and nightly)    HYDROXYZINE (ATARAX) 50 MG TABLET    Take 1 tablet by mouth 3 times daily as needed for Itching or Anxiety    IBUPROFEN (ADVIL;MOTRIN) 600 MG TABLET    Take 1 tablet by mouth every 6 hours as needed for Pain    LIDOCAINE VISCOUS HCL (XYLOCAINE) 2 % SOLN SOLUTION    Place 15 mLs vaginally as needed for Irritation (apply to vaginal and rectal area)    ONDANSETRON (ZOFRAN ODT) 4 MG DISINTEGRATING TABLET    Take 1 tablet by mouth every 8 hours as needed for Nausea or Vomiting    PRENATAL MV-MIN-FE FUM-FA-DHA (PRENATAL 1 PO)    Take by mouth daily     TUCKS (TUCKS) 50 % PADS    Apply 1 each topically as needed for Hemorrhoids       ALLERGIES     Patient is is allergic to codeine and suprax [cefixime]. Patients   Immunization History   Administered Date(s) Administered    Tdap (Boostrix, Adacel) 09/19/2020       FAMILY HISTORY     Patient's family history includes Breast Cancer in an other family member; Diabetes in her mother; Heart Attack in her paternal grandfather and paternal grandmother. SOCIAL HISTORY     Patient  reports that she has never smoked. She has never used smokeless tobacco. She reports that she does not currently use alcohol. She reports that she does not use drugs. PHYSICAL EXAM     ED TRIAGE VITALS  BP: 112/65, Temp: 97.9 °F (36.6 °C), Heart Rate: 92, Resp: 18, SpO2: 100 %,Estimated body mass index is 35.35 kg/m² as calculated from the following:    Height as of this encounter: 4' 11\" (1.499 m). Weight as of this encounter: 175 lb (79.4 kg). ,No LMP recorded. Physical Exam  Vitals and nursing note reviewed. Constitutional:       Appearance: She is obese. Cardiovascular:      Rate and Rhythm: Normal rate. Pulses: Normal pulses. Dorsalis pedis pulses are 2+ on the right side. Posterior tibial pulses are 2+ on the right side. Heart sounds: Normal heart sounds. Pulmonary:      Effort: Pulmonary effort is normal.   Musculoskeletal:         General: Signs of injury present. No deformity. Right ankle: Swelling present.  Tenderness present over the lateral malleolus. Decreased range of motion. Right Achilles Tendon: Normal.   Skin:     General: Skin is warm and dry. Findings: No bruising. Neurological:      Mental Status: She is alert and oriented to person, place, and time. DIAGNOSTIC RESULTS     Labs:No results found for this visit on 08/29/22. IMAGING:    XR ANKLE RIGHT (MIN 3 VIEWS)   Final Result   1. No radiographic evidence of acute injury to the right ankle. This document has been electronically signed by: Mason Benavidez MD on    08/29/2022 05:15 PM          I have independently reviewed the radiology images as well as the radiologist's report and have discussed them with the patient. EKG:  None    URGENT CARE COURSE:     Vitals:    08/29/22 1656   BP: 112/65   Pulse: 92   Resp: 18   Temp: 97.9 °F (36.6 °C)   TempSrc: Temporal   SpO2: 100%   Weight: 175 lb (79.4 kg)   Height: 4' 11\" (1.499 m)       Medications - No data to display       PROCEDURES:  None    FINAL IMPRESSION      1. Grade 1 ankle sprain      DISPOSITION/ PLAN   DISPOSITION Decision To Discharge 08/29/2022 05:17:31 PM     Clinical exam was consistent with a grade 1 ankle sprain to the right lateral malleolus. Ace wrap applied. Patient advised to rest, ice, compress with Ace wrap, and elevate while at home. May take acetaminophen and ibuprofen for discomfort. [facility] if worsens or fails to improve. Patient voiced understanding was agreeable to above-mentioned plan. Patient was discharged in stable condition.     PATIENT REFERRED TO:  YOVANNY Miranda CNP  [address] / [facility] HENSLEY [facility]      DISCHARGE MEDICATIONS:  New Prescriptions    No medications on file       Discontinued Medications    No medications on file       Current Discharge Medication List          [place] Habermann, APRN - CNP    (Please note that portions of this note were completed with a voice recognition program. Efforts were made to edit the dictations but occasionally words are mis-transcribed.)           Ayla Melo, APRN - CNP  08/29/22 1402

## 2022-08-29 NOTE — ED NOTES
Right ankle brace was discontinued by [facility] CNP- Ace wrap applied instead        Nash Richards RN  08/29/22 4009

## 2023-09-17 ENCOUNTER — HOSPITAL ENCOUNTER (EMERGENCY)
Age: 28
Discharge: HOME | End: 2023-09-17
Payer: MEDICAID

## 2023-09-17 VITALS
WEIGHT: 180 LBS | TEMPERATURE: 98 F | HEART RATE: 84 BPM | RESPIRATION RATE: 20 BRPM | SYSTOLIC BLOOD PRESSURE: 119 MMHG | DIASTOLIC BLOOD PRESSURE: 86 MMHG | OXYGEN SATURATION: 97 % | BODY MASS INDEX: 36.36 KG/M2

## 2023-09-17 DIAGNOSIS — N30.01 ACUTE CYSTITIS WITH HEMATURIA: Primary | ICD-10-CM

## 2023-09-17 LAB
BILIRUB UR STRIP.AUTO-MCNC: NEGATIVE MG/DL
CHARACTER UR: CLEAR
COLOR: YELLOW
GLUCOSE UR QL STRIP.AUTO: NEGATIVE MG/DL
HCG UR QL: NEGATIVE
KETONES UR QL STRIP.AUTO: NEGATIVE
NITRITE UR QL STRIP.AUTO: NEGATIVE
PH UR STRIP.AUTO: 8.5 [PH] (ref 5–9)
PROT UR STRIP.AUTO-MCNC: 100 MG/DL
RBC #/AREA URNS HPF: ABNORMAL /[HPF]
SP GR UR STRIP.AUTO: 1.02 (ref 1–1.03)
UROBILINOGEN, URINE: >= 8 EU/DL (ref 0.2–1)
WBC #/AREA URNS HPF: ABNORMAL /[HPF]

## 2023-09-17 PROCEDURE — 84703 CHORIONIC GONADOTROPIN ASSAY: CPT

## 2023-09-17 PROCEDURE — 87086 URINE CULTURE/COLONY COUNT: CPT

## 2023-09-17 PROCEDURE — 99213 OFFICE O/P EST LOW 20 MIN: CPT

## 2023-09-17 PROCEDURE — 81003 URINALYSIS AUTO W/O SCOPE: CPT

## 2023-09-17 PROCEDURE — 99214 OFFICE O/P EST MOD 30 MIN: CPT | Performed by: NURSE PRACTITIONER

## 2023-09-17 RX ORDER — NITROFURANTOIN 25; 75 MG/1; MG/1
100 CAPSULE ORAL 2 TIMES DAILY
Qty: 10 CAPSULE | Refills: 0 | Status: SHIPPED | OUTPATIENT
Start: 2023-09-17 | End: 2023-09-17 | Stop reason: ALTCHOICE

## 2023-09-17 RX ORDER — NITROFURANTOIN 25; 75 MG/1; MG/1
100 CAPSULE ORAL 2 TIMES DAILY
Qty: 10 CAPSULE | Refills: 0 | Status: SHIPPED | OUTPATIENT
Start: 2023-09-17 | End: 2023-09-22

## 2023-09-17 ASSESSMENT — ENCOUNTER SYMPTOMS
SHORTNESS OF BREATH: 0
COUGH: 0
NAUSEA: 0
VOMITING: 0

## 2023-09-17 ASSESSMENT — PAIN - FUNCTIONAL ASSESSMENT: PAIN_FUNCTIONAL_ASSESSMENT: NONE - DENIES PAIN

## 2023-09-17 NOTE — ED PROVIDER NOTES
[address]  Urgent Care Encounter       CHIEF COMPLAINT       Chief Complaint   Patient presents with    Dysuria       Nurses Notes reviewed and I agree except as noted in the HPI. HISTORY OF PRESENT ILLNESS   Urvashi Castillo is a 33 y.o. female who presents for evaluation of dysuria, urinary frequency and hematuria that been ongoing for the past 2 days. Patient denies any fever, chills, nausea, or vomiting. Denies any medications or interventions at home but states that she has had UTIs before and that this feels similar. She does voice concerns of possible pregnancy. The history is provided by the patient. REVIEW OF SYSTEMS     Review of Systems   Constitutional:  Negative for chills and fever. Respiratory:  Negative for cough and shortness of breath. Cardiovascular:  Negative for chest pain. Gastrointestinal:  Negative for nausea and vomiting. Genitourinary:  Positive for dysuria, frequency, hematuria and urgency. Musculoskeletal:  Negative for arthralgias and myalgias. Skin:  Negative for rash. Neurological:  Negative for headaches. PAST MEDICAL HISTORY         Diagnosis Date    Acid reflux     Asthma     Cyclic vomiting syndrome        SURGICALHISTORY     Patient  has a past surgical history that includes Tonsillectomy; Ankle fracture surgery (Right, 2014); and eye surgery.     CURRENT MEDICATIONS       Previous Medications    ACETAMINOPHEN (AMINOFEN) 325 MG TABLET    Take 2 tablets by mouth every 6 hours as needed for Pain    ALBUTEROL SULFATE IN    Inhale into the lungs    CITALOPRAM (CELEXA) 20 MG TABLET    Take 1 tablet by mouth daily    DICYCLOMINE (BENTYL) 10 MG CAPSULE    Take 1 capsule by mouth 4 times daily (before meals and nightly)    HYDROXYZINE (ATARAX) 50 MG TABLET    Take 1 tablet by mouth 3 times daily as needed for Itching or Anxiety    IBUPROFEN (ADVIL;MOTRIN) 600 MG TABLET    Take 1 tablet by mouth every 6 hours as needed for Pain

## 2023-09-19 LAB
BACTERIA UR CULT: ABNORMAL
ORGANISM: ABNORMAL

## 2024-01-01 NOTE — Clinical Note
Alice Price was seen and treated in our emergency department on 4/29/2022. She may return to work on 04/30/2022. If you have any questions or concerns, please don't hesitate to call.       Luis Galvin, YOVANNY - CNP
normal (ped)...

## 2025-04-19 ENCOUNTER — HOSPITAL ENCOUNTER (EMERGENCY)
Age: 30
Discharge: HOME | End: 2025-04-19
Payer: MEDICAID

## 2025-04-19 ENCOUNTER — APPOINTMENT (OUTPATIENT)
Dept: GENERAL RADIOLOGY | Age: 30
End: 2025-04-19
Payer: MEDICAID

## 2025-04-19 VITALS
TEMPERATURE: 98.8 F | SYSTOLIC BLOOD PRESSURE: 109 MMHG | WEIGHT: 184 LBS | HEART RATE: 83 BPM | OXYGEN SATURATION: 98 % | HEIGHT: 59 IN | RESPIRATION RATE: 18 BRPM | DIASTOLIC BLOOD PRESSURE: 73 MMHG | BODY MASS INDEX: 37.09 KG/M2

## 2025-04-19 DIAGNOSIS — S86.912A KNEE STRAIN, LEFT, INITIAL ENCOUNTER: Primary | ICD-10-CM

## 2025-04-19 PROCEDURE — 73564 X-RAY EXAM KNEE 4 OR MORE: CPT

## 2025-04-19 PROCEDURE — 99213 OFFICE O/P EST LOW 20 MIN: CPT

## 2025-04-19 ASSESSMENT — PAIN DESCRIPTION - FREQUENCY: FREQUENCY: CONTINUOUS

## 2025-04-19 ASSESSMENT — PAIN DESCRIPTION - LOCATION: LOCATION: ELBOW

## 2025-04-19 ASSESSMENT — PAIN DESCRIPTION - PAIN TYPE: TYPE: ACUTE PAIN

## 2025-04-19 ASSESSMENT — PAIN SCALES - GENERAL: PAINLEVEL_OUTOF10: 3

## 2025-04-19 ASSESSMENT — PAIN - FUNCTIONAL ASSESSMENT
PAIN_FUNCTIONAL_ASSESSMENT: 0-10
PAIN_FUNCTIONAL_ASSESSMENT: ACTIVITIES ARE NOT PREVENTED

## 2025-04-19 ASSESSMENT — PAIN DESCRIPTION - ORIENTATION: ORIENTATION: LEFT

## 2025-04-19 ASSESSMENT — PAIN DESCRIPTION - DESCRIPTORS: DESCRIPTORS: ACHING

## 2025-04-19 NOTE — ED PROVIDER NOTES
[facility] URGENT CARE  Urgent Care Encounter      CHIEF COMPLAINT       Chief Complaint   Patient presents with    Knee Injury     Left     Elbow Injury       Left sided       Nurses Notes reviewed and I agree except as noted in the HPI.  HISTORY OF PRESENT ILLNESS   Dede Thacker is a 30 y.o. female who presents to urgent care with complaints of left knee pain and left elbow pain.  Patient reports yesterday while walking to work her ankle gave out and she fell onto left knee and left elbow.  Patient denies numbness, tingling, loss of sensation.  Patient reports area is just very tender.  Denies over-the-counter medication.    REVIEW OF SYSTEMS     Review of Systems   Constitutional:  Negative for fever.   Musculoskeletal:  Positive for joint swelling and myalgias.   Neurological:  Negative for seizures.       PAST MEDICAL HISTORY         Diagnosis Date    Acid reflux     Asthma     Cyclic vomiting syndrome        SURGICAL HISTORY     Patient  has a past surgical history that includes Tonsillectomy; Ankle fracture surgery (Right, 2014); and eye surgery.    CURRENT MEDICATIONS       Discharge Medication List as of 4/19/2025  4:33 PM        CONTINUE these medications which have NOT CHANGED    Details   citalopram (CELEXA) 20 MG tablet Take 1 tablet by mouth daily, Disp-30 tablet, R-1Normal      hydrOXYzine (ATARAX) 50 MG tablet Take 1 tablet by mouth 3 times daily as needed for Itching or Anxiety, Disp-60 tablet, R-0Normal      dicyclomine (BENTYL) 10 MG capsule Take 1 capsule by mouth 4 times daily (before meals and nightly), Disp-30 capsule, R-0Normal      ondansetron (ZOFRAN ODT) 4 MG disintegrating tablet Take 1 tablet by mouth every 8 hours as needed for Nausea or Vomiting, Disp-20 tablet, R-0Normal      tucks (TUCKS) 50 % PADS Apply 1 each topically as needed for Hemorrhoids, Disp-40 each,R-0Print      lidocaine viscous hcl (XYLOCAINE) 2 % SOLN solution Place 15 mLs vaginally as needed for Irritation

## 2025-04-19 NOTE — ED NOTES
Patient presents to  with significant other with complaints of walking to work yesterday and falling due to her \"ankle giving out\". Patient reports she injured her left knee and left elbow.     Anette Yo RN  04/19/25 1495